# Patient Record
Sex: MALE | Race: WHITE | NOT HISPANIC OR LATINO | Employment: FULL TIME | URBAN - NONMETROPOLITAN AREA
[De-identification: names, ages, dates, MRNs, and addresses within clinical notes are randomized per-mention and may not be internally consistent; named-entity substitution may affect disease eponyms.]

---

## 2017-04-22 ENCOUNTER — APPOINTMENT (OUTPATIENT)
Dept: GENERAL RADIOLOGY | Facility: HOSPITAL | Age: 37
End: 2017-04-22

## 2017-04-22 ENCOUNTER — HOSPITAL ENCOUNTER (INPATIENT)
Facility: HOSPITAL | Age: 37
LOS: 4 days | Discharge: HOME | End: 2017-04-26
Attending: EMERGENCY MEDICINE | Admitting: FAMILY MEDICINE

## 2017-04-22 DIAGNOSIS — J18.9 PNEUMONIA OF RIGHT LUNG DUE TO INFECTIOUS ORGANISM, UNSPECIFIED PART OF LUNG: Primary | ICD-10-CM

## 2017-04-22 LAB
ALBUMIN SERPL-MCNC: 4.2 G/DL (ref 3.5–5)
ALBUMIN/GLOB SERPL: 1.3 G/DL (ref 1.1–2.5)
ALP SERPL-CCNC: 73 U/L (ref 24–120)
ALT SERPL W P-5'-P-CCNC: 42 U/L (ref 0–54)
ANION GAP SERPL CALCULATED.3IONS-SCNC: 16 MMOL/L (ref 4–13)
AST SERPL-CCNC: 36 U/L (ref 7–45)
BASOPHILS # BLD AUTO: 0.03 10*3/MM3 (ref 0–0.2)
BASOPHILS NFR BLD AUTO: 0.1 % (ref 0–2)
BILIRUB SERPL-MCNC: 0.5 MG/DL (ref 0.1–1)
BUN BLD-MCNC: 14 MG/DL (ref 5–21)
BUN/CREAT SERPL: 18.7 (ref 7–25)
CALCIUM SPEC-SCNC: 9.1 MG/DL (ref 8.4–10.4)
CHLORIDE SERPL-SCNC: 101 MMOL/L (ref 98–110)
CO2 SERPL-SCNC: 25 MMOL/L (ref 24–31)
CREAT BLD-MCNC: 0.75 MG/DL (ref 0.5–1.4)
CRP SERPL-MCNC: 0.81 MG/DL (ref 0–0.99)
D-LACTATE SERPL-SCNC: 1.1 MMOL/L (ref 0.5–2)
D-LACTATE SERPL-SCNC: 2.1 MMOL/L (ref 0.5–2)
DEPRECATED RDW RBC AUTO: 45.4 FL (ref 40–54)
EOSINOPHIL # BLD AUTO: 0.08 10*3/MM3 (ref 0–0.7)
EOSINOPHIL NFR BLD AUTO: 0.4 % (ref 0–4)
ERYTHROCYTE [DISTWIDTH] IN BLOOD BY AUTOMATED COUNT: 14.8 % (ref 12–15)
ETHANOL UR QL: 0.08 %
GFR SERPL CREATININE-BSD FRML MDRD: 117 ML/MIN/1.73
GLOBULIN UR ELPH-MCNC: 3.2 GM/DL
GLUCOSE BLD-MCNC: 118 MG/DL (ref 70–100)
HCT VFR BLD AUTO: 47.7 % (ref 40–52)
HGB BLD-MCNC: 16 G/DL (ref 14–18)
IMM GRANULOCYTES # BLD: 0.11 10*3/MM3 (ref 0–0.03)
IMM GRANULOCYTES NFR BLD: 0.5 % (ref 0–5)
LYMPHOCYTES # BLD AUTO: 1.26 10*3/MM3 (ref 0.72–4.86)
LYMPHOCYTES NFR BLD AUTO: 6.1 % (ref 15–45)
MCH RBC QN AUTO: 28.4 PG (ref 28–32)
MCHC RBC AUTO-ENTMCNC: 33.5 G/DL (ref 33–36)
MCV RBC AUTO: 84.6 FL (ref 82–95)
MONOCYTES # BLD AUTO: 0.97 10*3/MM3 (ref 0.19–1.3)
MONOCYTES NFR BLD AUTO: 4.7 % (ref 4–12)
NEUTROPHILS # BLD AUTO: 18.21 10*3/MM3 (ref 1.87–8.4)
NEUTROPHILS NFR BLD AUTO: 88.2 % (ref 39–78)
PLATELET # BLD AUTO: 315 10*3/MM3 (ref 130–400)
PMV BLD AUTO: 9.4 FL (ref 6–12)
POTASSIUM BLD-SCNC: 4.4 MMOL/L (ref 3.5–5.3)
PROT SERPL-MCNC: 7.4 G/DL (ref 6.3–8.7)
RBC # BLD AUTO: 5.64 10*6/MM3 (ref 4.8–5.9)
SODIUM BLD-SCNC: 142 MMOL/L (ref 135–145)
WBC NRBC COR # BLD: 20.66 10*3/MM3 (ref 4.8–10.8)

## 2017-04-22 PROCEDURE — 36415 COLL VENOUS BLD VENIPUNCTURE: CPT | Performed by: EMERGENCY MEDICINE

## 2017-04-22 PROCEDURE — 94640 AIRWAY INHALATION TREATMENT: CPT

## 2017-04-22 PROCEDURE — 86140 C-REACTIVE PROTEIN: CPT | Performed by: EMERGENCY MEDICINE

## 2017-04-22 PROCEDURE — 94760 N-INVAS EAR/PLS OXIMETRY 1: CPT

## 2017-04-22 PROCEDURE — 80053 COMPREHEN METABOLIC PANEL: CPT | Performed by: EMERGENCY MEDICINE

## 2017-04-22 PROCEDURE — 94799 UNLISTED PULMONARY SVC/PX: CPT

## 2017-04-22 PROCEDURE — 83605 ASSAY OF LACTIC ACID: CPT | Performed by: EMERGENCY MEDICINE

## 2017-04-22 PROCEDURE — 99284 EMERGENCY DEPT VISIT MOD MDM: CPT

## 2017-04-22 PROCEDURE — 85025 COMPLETE CBC W/AUTO DIFF WBC: CPT | Performed by: EMERGENCY MEDICINE

## 2017-04-22 PROCEDURE — 25010000002 PIPERACILLIN SOD-TAZOBACTAM PER 1 G: Performed by: EMERGENCY MEDICINE

## 2017-04-22 PROCEDURE — 25010000002 ENOXAPARIN PER 10 MG: Performed by: FAMILY MEDICINE

## 2017-04-22 PROCEDURE — 87040 BLOOD CULTURE FOR BACTERIA: CPT | Performed by: EMERGENCY MEDICINE

## 2017-04-22 PROCEDURE — 80307 DRUG TEST PRSMV CHEM ANLYZR: CPT | Performed by: EMERGENCY MEDICINE

## 2017-04-22 PROCEDURE — 71020 HC CHEST PA AND LATERAL: CPT

## 2017-04-22 RX ORDER — BENAZEPRIL HYDROCHLORIDE 20 MG/1
40 TABLET ORAL
Status: DISCONTINUED | OUTPATIENT
Start: 2017-04-22 | End: 2017-04-26 | Stop reason: HOSPADM

## 2017-04-22 RX ORDER — PRAVASTATIN SODIUM 40 MG
40 TABLET ORAL DAILY
COMMUNITY

## 2017-04-22 RX ORDER — IPRATROPIUM BROMIDE AND ALBUTEROL SULFATE 2.5; .5 MG/3ML; MG/3ML
3 SOLUTION RESPIRATORY (INHALATION)
Status: DISCONTINUED | OUTPATIENT
Start: 2017-04-22 | End: 2017-04-24

## 2017-04-22 RX ORDER — ACETAMINOPHEN 325 MG/1
650 TABLET ORAL EVERY 6 HOURS PRN
Status: DISCONTINUED | OUTPATIENT
Start: 2017-04-22 | End: 2017-04-26 | Stop reason: HOSPADM

## 2017-04-22 RX ORDER — AMLODIPINE BESYLATE AND BENAZEPRIL HYDROCHLORIDE 5; 40 MG/1; MG/1
1 CAPSULE ORAL DAILY
Status: ON HOLD | COMMUNITY
End: 2022-12-27

## 2017-04-22 RX ORDER — AMLODIPINE BESYLATE 5 MG/1
5 TABLET ORAL
Status: DISCONTINUED | OUTPATIENT
Start: 2017-04-22 | End: 2017-04-26 | Stop reason: HOSPADM

## 2017-04-22 RX ORDER — PANTOPRAZOLE SODIUM 40 MG/1
40 TABLET, DELAYED RELEASE ORAL
Status: DISCONTINUED | OUTPATIENT
Start: 2017-04-23 | End: 2017-04-26 | Stop reason: HOSPADM

## 2017-04-22 RX ORDER — SODIUM CHLORIDE 0.9 % (FLUSH) 0.9 %
10 SYRINGE (ML) INJECTION AS NEEDED
Status: DISCONTINUED | OUTPATIENT
Start: 2017-04-22 | End: 2017-04-26 | Stop reason: HOSPADM

## 2017-04-22 RX ORDER — AMLODIPINE BESYLATE AND BENAZEPRIL HYDROCHLORIDE 5; 40 MG/1; MG/1
1 CAPSULE ORAL DAILY
Status: DISCONTINUED | OUTPATIENT
Start: 2017-04-22 | End: 2017-04-22 | Stop reason: ALTCHOICE

## 2017-04-22 RX ORDER — ONDANSETRON 2 MG/ML
4 INJECTION INTRAMUSCULAR; INTRAVENOUS EVERY 6 HOURS PRN
Status: DISCONTINUED | OUTPATIENT
Start: 2017-04-22 | End: 2017-04-26 | Stop reason: HOSPADM

## 2017-04-22 RX ORDER — OMEPRAZOLE 20 MG/1
20 CAPSULE, DELAYED RELEASE ORAL 2 TIMES DAILY
COMMUNITY

## 2017-04-22 RX ADMIN — AMLODIPINE BESYLATE 5 MG: 5 TABLET ORAL at 14:54

## 2017-04-22 RX ADMIN — VANCOMYCIN HYDROCHLORIDE 1000 MG: 1 INJECTION, POWDER, LYOPHILIZED, FOR SOLUTION INTRAVENOUS at 09:00

## 2017-04-22 RX ADMIN — BENAZEPRIL HYDROCHLORIDE 40 MG: 20 TABLET, COATED ORAL at 14:54

## 2017-04-22 RX ADMIN — ENOXAPARIN SODIUM 40 MG: 40 INJECTION SUBCUTANEOUS at 14:55

## 2017-04-22 RX ADMIN — IPRATROPIUM BROMIDE AND ALBUTEROL SULFATE 3 ML: .5; 3 SOLUTION RESPIRATORY (INHALATION) at 15:07

## 2017-04-22 RX ADMIN — IPRATROPIUM BROMIDE AND ALBUTEROL SULFATE 3 ML: .5; 3 SOLUTION RESPIRATORY (INHALATION) at 19:45

## 2017-04-22 RX ADMIN — ACETAMINOPHEN 650 MG: 325 TABLET ORAL at 14:55

## 2017-04-22 RX ADMIN — TAZOBACTAM SODIUM AND PIPERACILLIN SODIUM 4.5 G: 500; 4 INJECTION, SOLUTION INTRAVENOUS at 10:21

## 2017-04-22 NOTE — H&P
History and Physical      CHIEF COMPLAINT:  SOA, cough    Reason for Admission:  SOA, cough    History Obtained From:  Patient/wife/chart    HISTORY OF PRESENT ILLNESS:      The patient is a 36 y.o. male who came to the ER with SOA, cough. He was intoxicated, and had an episode early this morning of vomiting. He then began coughing and has been SOA. He has an infiltrate on CXR. He was well, until this episode this am. He denies recent fever. He has GERD, but it is controlled with medicine. He denies CP. He is c/o HA.     Past Medical History:    Past Medical History:   Diagnosis Date   • Hyperlipidemia    • Hypertension      Past Surgical History:    Past Surgical History:   Procedure Laterality Date   • ADENOIDECTOMY     • CHOLECYSTECTOMY     • TONSILLECTOMY     • TYMPANOSTOMY TUBE PLACEMENT           Medications Prior to Admission:    Prescriptions Prior to Admission   Medication Sig Dispense Refill Last Dose   • amLODIPine-benazepril (LOTREL) 5-40 MG per capsule Take 1 capsule by mouth Daily.   4/20/2017 at 2100   • omeprazole (priLOSEC) 20 MG capsule Take 20 mg by mouth Daily.   4/20/2017 at 0900   • pravastatin (PRAVACHOL) 40 MG tablet Take 40 mg by mouth Daily.   4/20/2017 at 2100       Allergies:  Review of patient's allergies indicates no known allergies.    Social History:   TOBACCO:   reports that he has quit smoking. His smoking use included Cigarettes. He does not have any smokeless tobacco history on file.  ETOH:   reports that he drinks alcohol.  DRUGS:   reports that he does not use illicit drugs.  MARITAL STATUS:    Patient currently lives with Family      Family History:   History reviewed. No pertinent family history.  REVIEW OF SYSTEMS:  Constitutional: neg  CV: neg  Pulmonary: cough, SOA  GI: neg  : neg  Psych: neg  Neuro: headache  Skin: neg  MusculoSkeletal: neg  HEENT: neg  Joints: neg  Vitals:  /83 (BP Location: Right arm, Patient Position: Sitting)  Pulse 102  Temp 99.2 °F  "(37.3 °C) (Temporal Artery )   Resp 22  Ht 72\" (182.9 cm)  Wt (!) 330 lb (150 kg)  SpO2 95%  BMI 44.76 kg/m2    PHYSICAL EXAM:  Gen: NAD, alert, pleasant, on O2  HEENT: WNL  Lymph: no LAD  Neck: no JVD or masses  Chest: coarse on right   CV: RRR  Abdomen: NT/ND  Extrem: no C/C/E  Neuro: non focal  Skin: no rashes  Joints: no redness  DATA:  I have reviewed the admission labs and imaging tests.    ASSESSMENT AND PLAN:      Aspiration Pneumonia---continue current antibiotic treatment, consult Pulmonary, continue O2 and wean as able, add                                                 nebulized treatments  Alcohol Abuse----ask Social Service to give patient resources for cessation, monitor for any withdrawal  HTN---monitor blood pressure    Gómez Franklin MD  1:58 PM 4/22/2017  "

## 2017-04-22 NOTE — PLAN OF CARE
Problem: Patient Care Overview (Adult)  Goal: Plan of Care Review  Outcome: Ongoing (interventions implemented as appropriate)    04/22/17 5406   Coping/Psychosocial Response Interventions   Plan Of Care Reviewed With patient   Patient Care Overview   Progress progress toward functional goals as expected         Problem: Infection, Risk/Actual (Adult)  Goal: Infection Prevention/Resolution  Outcome: Ongoing (interventions implemented as appropriate)

## 2017-04-22 NOTE — ED NOTES
Pt states that he got drunk last night.  Pt's wife states that pt vomited around 0030 - 0100 this morning.  Wife states that pt woke her up coughing around 0300 - 0400 this morning.     Mirela Payne RN  04/22/17 0703

## 2017-04-22 NOTE — ED NOTES
PT IS SLEEPING AND HAS NOTED APNEIC EPISODE WITH INCREASED RESPIRATION. PT PLACED ON O2 MONITOR AND SATS 84% RA & RR 40. PT IS MOUTH BREATHING. NOTIFIED DR. SAUCEDA. PT PLACED ON NR MASK AT 12LPM  PER DR. SAUCEDA. PT'S SATS INCREASED TO 95%.      Jacqueline Nicholson RN  04/22/17 0963

## 2017-04-22 NOTE — ED PROVIDER NOTES
Subjective   HPI Comments: Had lot to drink last night but none since midnight.  Did have some vomiting after that.  Woke up with severe cough couple of hours after that.  Has been uncontrolled cough since that time.  Little better now but starts again any time he breathes deep.    Patient is a 36 y.o. male presenting with cough.   History provided by:  Patient and spouse   used: No    Cough   Cough characteristics:  Non-productive and harsh  Severity:  Severe  Onset quality:  Sudden  Duration:  3 hours  Timing:  Intermittent  Progression:  Unchanged  Chronicity:  New  Smoker: no    Context: not animal exposure, not exposure to allergens, not occupational exposure, not sick contacts, not smoke exposure, not upper respiratory infection, not weather changes and not with activity    Relieved by:  Nothing  Worsened by:  Nothing  Ineffective treatments:  None tried  Associated symptoms: no chest pain, no chills, no diaphoresis, no ear fullness, no ear pain, no eye discharge, no fever, no headaches, no rhinorrhea, no sinus congestion and no weight loss    Risk factors: no chemical exposure, no recent infection and no recent travel        Review of Systems   Constitutional: Negative.  Negative for chills, diaphoresis, fever and weight loss.   HENT: Negative.  Negative for ear pain and rhinorrhea.    Eyes: Negative for discharge.   Respiratory: Positive for cough.    Cardiovascular: Negative.  Negative for chest pain.   Gastrointestinal: Positive for vomiting.   Genitourinary: Negative.    Musculoskeletal: Negative.    Skin: Negative.    Neurological: Negative.  Negative for headaches.   Hematological: Negative.    Psychiatric/Behavioral: Negative.    All other systems reviewed and are negative.      Past Medical History:   Diagnosis Date   • Hyperlipidemia    • Hypertension        No Known Allergies    Past Surgical History:   Procedure Laterality Date   • ADENOIDECTOMY     • CHOLECYSTECTOMY     •  TONSILLECTOMY     • TYMPANOSTOMY TUBE PLACEMENT         History reviewed. No pertinent family history.    Social History     Social History   • Marital status:      Spouse name: N/A   • Number of children: N/A   • Years of education: N/A     Social History Main Topics   • Smoking status: Former Smoker   • Smokeless tobacco: None   • Alcohol use Yes      Comment: beer every weekend   • Drug use: No   • Sexual activity: Not Asked     Other Topics Concern   • None     Social History Narrative   • None       Prior to Admission medications    Medication Sig Start Date End Date Taking? Authorizing Provider   AMLODIPINE BESYLATE PO Take 1 tablet by mouth Daily.   Yes Historical Provider, MD   LISINOPRIL PO Take 1 tablet by mouth Daily.   Yes Historical Provider, MD   OMEPRAZOLE PO Take 1 tablet by mouth Daily.   Yes Historical Provider, MD       Medications   sodium chloride 0.9 % flush 10 mL (not administered)   piperacillin-tazobactam (ZOSYN) 4.5 g in iso-osmotic dextrose 100 mL IVPB (premix) (4.5 g Intravenous New Bag 4/22/17 1021)   vancomycin 1 g/250 mL 0.9% NS (vial-mate) (0 mg Intravenous Stopped 4/22/17 1018)       Vitals:    04/22/17 1044   BP: 151/68   Pulse: 115   Resp: 22   Temp: 98.9 °F (37.2 °C)   SpO2: 98%         Objective   Physical Exam   Constitutional: He is oriented to person, place, and time. He appears well-developed and well-nourished.   HENT:   Head: Normocephalic and atraumatic.   Mouth/Throat: Oropharynx is clear and moist.   Eyes: EOM are normal. Pupils are equal, round, and reactive to light.   Neck: Normal range of motion. Neck supple.   Cardiovascular: Normal rate and regular rhythm.    Pulmonary/Chest: Effort normal and breath sounds normal.   Abdominal: Soft. Bowel sounds are normal. There is no tenderness.   obese   Musculoskeletal: Normal range of motion.   Neurological: He is alert and oriented to person, place, and time.   Skin: Skin is warm and dry.   Psychiatric: He has a  normal mood and affect. His behavior is normal.   Nursing note and vitals reviewed.      Procedures         Lab Results (last 24 hours)     Procedure Component Value Units Date/Time    CBC & Differential [[id number]] Collected:  04/22/17 0904    Specimen:  Blood Updated:  04/22/17 0938    Narrative:       The following orders were created for panel order CBC & Differential.  Procedure                               Abnormality         Status                     ---------                               -----------         ------                     CBC Auto Differential[[id number]]         Abnormal            Final result                 Please view results for these tests on the individual orders.    Comprehensive Metabolic Panel [[id number]]  (Abnormal) Collected:  04/22/17 0904    Specimen:  Blood Updated:  04/22/17 0952     Glucose 118 (H) mg/dL      BUN 14 mg/dL      Creatinine 0.75 mg/dL      Sodium 142 mmol/L      Potassium 4.4 mmol/L      Chloride 101 mmol/L      CO2 25.0 mmol/L      Calcium 9.1 mg/dL      Total Protein 7.4 g/dL      Albumin 4.20 g/dL      ALT (SGPT) 42 U/L      AST (SGOT) 36 U/L      Alkaline Phosphatase 73 U/L      Total Bilirubin 0.5 mg/dL      eGFR Non African Amer 117 mL/min/1.73      Globulin 3.2 gm/dL      A/G Ratio 1.3 g/dL      BUN/Creatinine Ratio 18.7     Anion Gap 16.0 (H) mmol/L     Blood Culture [[id number]] Collected:  04/22/17 0904    Specimen:  Blood from Hand, Right Updated:  04/22/17 0933    Blood Culture [[id number]] Collected:  04/22/17 0904    Specimen:  Blood from Arm, Right Updated:  04/22/17 0933    Lactic Acid, Plasma [[id number]]  (Abnormal) Collected:  04/22/17 0904    Specimen:  Blood Updated:  04/22/17 0958     Lactate 2.1 (C) mmol/L     C-reactive Protein [[id number]]  (Normal) Collected:  04/22/17 0904    Specimen:  Blood Updated:  04/22/17 0952     C-Reactive Protein 0.81 mg/dL     CBC Auto Differential [[id number]]  (Abnormal) Collected:  04/22/17 0904    Specimen:  Blood  Updated:  04/22/17 0938     WBC 20.66 (H) 10*3/mm3      RBC 5.64 10*6/mm3      Hemoglobin 16.0 g/dL      Hematocrit 47.7 %      MCV 84.6 fL      MCH 28.4 pg      MCHC 33.5 g/dL      RDW 14.8 %      RDW-SD 45.4 fl      MPV 9.4 fL      Platelets 315 10*3/mm3      Neutrophil % 88.2 (H) %      Lymphocyte % 6.1 (L) %      Monocyte % 4.7 %      Eosinophil % 0.4 %      Basophil % 0.1 %      Immature Grans % 0.5 %      Neutrophils, Absolute 18.21 (H) 10*3/mm3      Lymphocytes, Absolute 1.26 10*3/mm3      Monocytes, Absolute 0.97 10*3/mm3      Eosinophils, Absolute 0.08 10*3/mm3      Basophils, Absolute 0.03 10*3/mm3      Immature Grans, Absolute 0.11 (H) 10*3/mm3     Ethanol [[id number]]  (Abnormal) Collected:  04/22/17 0904    Specimen:  Blood Updated:  04/22/17 0952     Ethanol % 0.084 (H) %     Narrative:       Not for legal purposes. Chain of Custody not followed.           XR Chest 2 View   Preliminary Result   Impression:       Ill-defined opacities in the right lung, nonspecific as they may reflect   reactive changes, aspiration or infectious etiology. Clinical   correlation recommended. There is no focal consolidation.       This report was finalized on  by Dr. Jeannie Harmon MD.          ED Course  ED Course   Comment By Time   Spoke with Dr. Franklin on call for Dr. Jean Baptiste and will admit. Rachid Armendariz Jr., MD 04/22 1111          MDM  Number of Diagnoses or Management Options  Pneumonia of right lung due to infectious organism, unspecified part of lung: new and requires workup     Amount and/or Complexity of Data Reviewed  Clinical lab tests: ordered and reviewed  Tests in the radiology section of CPT®: ordered and reviewed    Risk of Complications, Morbidity, and/or Mortality  Presenting problems: moderate  Diagnostic procedures: moderate  Management options: moderate    Patient Progress  Patient progress: stable      Final diagnoses:   Pneumonia of right lung due to infectious organism, unspecified part of lung         Rachid Armendariz Jr., MD  04/22/17 1111

## 2017-04-23 LAB
ANION GAP SERPL CALCULATED.3IONS-SCNC: 9 MMOL/L (ref 4–13)
BASOPHILS # BLD AUTO: 0.02 10*3/MM3 (ref 0–0.2)
BASOPHILS NFR BLD AUTO: 0.2 % (ref 0–2)
BUN BLD-MCNC: 15 MG/DL (ref 5–21)
BUN/CREAT SERPL: 18.5 (ref 7–25)
CALCIUM SPEC-SCNC: 8.8 MG/DL (ref 8.4–10.4)
CHLORIDE SERPL-SCNC: 100 MMOL/L (ref 98–110)
CO2 SERPL-SCNC: 30 MMOL/L (ref 24–31)
CREAT BLD-MCNC: 0.81 MG/DL (ref 0.5–1.4)
DEPRECATED RDW RBC AUTO: 48.1 FL (ref 40–54)
EOSINOPHIL # BLD AUTO: 0.33 10*3/MM3 (ref 0–0.7)
EOSINOPHIL NFR BLD AUTO: 2.8 % (ref 0–4)
ERYTHROCYTE [DISTWIDTH] IN BLOOD BY AUTOMATED COUNT: 15.4 % (ref 12–15)
GFR SERPL CREATININE-BSD FRML MDRD: 107 ML/MIN/1.73
GLUCOSE BLD-MCNC: 161 MG/DL (ref 70–100)
HCT VFR BLD AUTO: 42.9 % (ref 40–52)
HGB BLD-MCNC: 13.8 G/DL (ref 14–18)
IMM GRANULOCYTES # BLD: 0.07 10*3/MM3 (ref 0–0.03)
IMM GRANULOCYTES NFR BLD: 0.6 % (ref 0–5)
LYMPHOCYTES # BLD AUTO: 1.01 10*3/MM3 (ref 0.72–4.86)
LYMPHOCYTES NFR BLD AUTO: 8.6 % (ref 15–45)
MCH RBC QN AUTO: 27.7 PG (ref 28–32)
MCHC RBC AUTO-ENTMCNC: 32.2 G/DL (ref 33–36)
MCV RBC AUTO: 86.1 FL (ref 82–95)
MONOCYTES # BLD AUTO: 1.02 10*3/MM3 (ref 0.19–1.3)
MONOCYTES NFR BLD AUTO: 8.7 % (ref 4–12)
NEUTROPHILS # BLD AUTO: 9.29 10*3/MM3 (ref 1.87–8.4)
NEUTROPHILS NFR BLD AUTO: 79.1 % (ref 39–78)
PLATELET # BLD AUTO: 261 10*3/MM3 (ref 130–400)
PMV BLD AUTO: 9.1 FL (ref 6–12)
POTASSIUM BLD-SCNC: 4.5 MMOL/L (ref 3.5–5.3)
RBC # BLD AUTO: 4.98 10*6/MM3 (ref 4.8–5.9)
SODIUM BLD-SCNC: 139 MMOL/L (ref 135–145)
WBC NRBC COR # BLD: 11.74 10*3/MM3 (ref 4.8–10.8)

## 2017-04-23 PROCEDURE — 85025 COMPLETE CBC W/AUTO DIFF WBC: CPT | Performed by: FAMILY MEDICINE

## 2017-04-23 PROCEDURE — 94799 UNLISTED PULMONARY SVC/PX: CPT

## 2017-04-23 PROCEDURE — 25010000002 ENOXAPARIN PER 10 MG: Performed by: FAMILY MEDICINE

## 2017-04-23 PROCEDURE — 94760 N-INVAS EAR/PLS OXIMETRY 1: CPT

## 2017-04-23 PROCEDURE — 80048 BASIC METABOLIC PNL TOTAL CA: CPT | Performed by: FAMILY MEDICINE

## 2017-04-23 RX ADMIN — IPRATROPIUM BROMIDE AND ALBUTEROL SULFATE 3 ML: .5; 3 SOLUTION RESPIRATORY (INHALATION) at 07:22

## 2017-04-23 RX ADMIN — IPRATROPIUM BROMIDE AND ALBUTEROL SULFATE 3 ML: .5; 3 SOLUTION RESPIRATORY (INHALATION) at 15:36

## 2017-04-23 RX ADMIN — IPRATROPIUM BROMIDE AND ALBUTEROL SULFATE 3 ML: .5; 3 SOLUTION RESPIRATORY (INHALATION) at 11:08

## 2017-04-23 RX ADMIN — PANTOPRAZOLE SODIUM 40 MG: 40 TABLET, DELAYED RELEASE ORAL at 05:27

## 2017-04-23 RX ADMIN — BENAZEPRIL HYDROCHLORIDE 40 MG: 20 TABLET, COATED ORAL at 08:18

## 2017-04-23 RX ADMIN — ENOXAPARIN SODIUM 40 MG: 40 INJECTION SUBCUTANEOUS at 14:28

## 2017-04-23 RX ADMIN — ACETAMINOPHEN 650 MG: 325 TABLET ORAL at 05:27

## 2017-04-23 RX ADMIN — IPRATROPIUM BROMIDE AND ALBUTEROL SULFATE 3 ML: .5; 3 SOLUTION RESPIRATORY (INHALATION) at 19:56

## 2017-04-23 RX ADMIN — AMLODIPINE BESYLATE 5 MG: 5 TABLET ORAL at 08:18

## 2017-04-23 RX ADMIN — ACETAMINOPHEN 650 MG: 325 TABLET ORAL at 15:29

## 2017-04-23 NOTE — PLAN OF CARE
Problem: Patient Care Overview (Adult)  Goal: Plan of Care Review  Outcome: Ongoing (interventions implemented as appropriate)    04/23/17 1539   Coping/Psychosocial Response Interventions   Plan Of Care Reviewed With patient   Patient Care Overview   Progress improving   Outcome Evaluation   Outcome Summary/Follow up Plan patient O2 wheened down to 6L this shift.        Goal: Adult Individualization and Mutuality  Outcome: Ongoing (interventions implemented as appropriate)  Goal: Discharge Needs Assessment  Outcome: Ongoing (interventions implemented as appropriate)    Problem: Infection, Risk/Actual (Adult)  Goal: Identify Related Risk Factors and Signs and Symptoms  Outcome: Ongoing (interventions implemented as appropriate)  Goal: Infection Prevention/Resolution  Outcome: Ongoing (interventions implemented as appropriate)

## 2017-04-23 NOTE — PROGRESS NOTES
Discharge Planning Assessment   [facility]     Patient Name: Conner Almonte  MRN: [id number]  Today's Date: 4/23/2017    Admit Date: 4/22/2017          Discharge Needs Assessment       04/23/17 1045    Living Environment    Lives With spouse    Quality Of Family Relationships involved    Able to Return to Prior Living Arrangements yes    Discharge Needs Assessment    Concerns To Be Addressed denies needs/concerns at this time    Anticipated Changes Related to Illness none    Equipment Currently Used at Home none    Equipment Needed After Discharge none    Transportation Available car;family or friend will provide    Current Discharge Risk substance abuse    Discharge Disposition home or self-care    Discharge Planning Comments Pt was sleeping soundly after attempting to awake him several times. Spoke with his wife, who was in the room. She said that pt is independent and the plan is to go home at d/c. Left a chemical dependency packet for the pt to review. Spouse denies needs for pt.             Discharge Plan     None        Discharge Placement     No information found                Demographic Summary     None            Functional Status     None            Psychosocial     None            Abuse/Neglect     None            Legal     None            Substance Abuse     None            Patient Forms     None          ARIEL Roque

## 2017-04-23 NOTE — CONSULTS
PULMONARY & CRITICAL CARE CONSULT - [facility]    17, 8:46 AM  Patient Care Team:  Donovan Jean Baptiste MD as PCP - General (Family Medicine)  Name: Conner Almonte  : 1980  MRN: [id number]  Contact Serial Number [id number]    Chief complaint: Pneumonia    HPI:  We have been consulted by Donovan Jean Baptiste MD to see this 36 y.o. year old male born on 1980.  Patient complains of cough in the chest for 2 days. Severity: moderate.  Aggravating factors: deep inspiration.   Alleviating factors: medication(s) (albuterol and atrovent) Associated symptoms: chest pain, poor exercise tolerance and vomiting. Sputum is scant.  Patient currently is not on home oxygen therapy.. Respiratory history: no history of pneumonia or bronchitis  Patient history include chronic alcohol abuse. Apparently drank some amount of alcohol on Friday and came in to the emergency room intoxicated and complaining of a persistent cough. There was some question as to whether or not he aspirated while vomiting the night before. He did not confirm this and was unsure. He does report a history of reflux issues that is being treated. He reports that in some ways he feels worse today than he did when he came in. He denies feeling overly short of breath although he is requiring 11L high flow oxygen. He just reports feeling fatigue and soreness in his chest from coughing so much. He denies any lung history but his wife reports that he snores a lot, has fatigue at times and they were going to set him up for a sleep study but that never happened. No other aggravating, alleviating factors or associated symptoms.    Past Medical History:  Past Medical History:   Diagnosis Date   • Hyperlipidemia    • Hypertension      Past Surgical History:   Procedure Laterality Date   • ADENOIDECTOMY     • CHOLECYSTECTOMY     • TONSILLECTOMY     • TYMPANOSTOMY TUBE PLACEMENT       No Known Allergies  Medications:    amLODIPine 5 mg Oral Q24H    benazepril 40 mg Oral Q24H   enoxaparin 40 mg Subcutaneous Q24H   ipratropium-albuterol 3 mL Nebulization 4x Daily - RT   pantoprazole 40 mg Oral Q AM        Family History:  History reviewed. No pertinent family history.  Social History:   reports that he has quit smoking. His smoking use included Cigarettes. He does not have any smokeless tobacco history on file. He reports that he drinks alcohol. He reports that he does not use illicit drugs.  Review of Systems:  Review of Systems   Constitutional: Positive for activity change, appetite change and fatigue. Negative for chills, diaphoresis and unexpected weight change.   HENT: Positive for congestion. Negative for ear pain, nosebleeds, sneezing and trouble swallowing.    Eyes: Negative for pain, discharge and itching.   Respiratory: Positive for cough, choking and chest tightness. Negative for apnea, shortness of breath and wheezing.    Cardiovascular: Positive for leg swelling. Negative for chest pain and palpitations.   Gastrointestinal: Negative for abdominal distention, constipation, nausea and vomiting.   Endocrine: Negative for cold intolerance, polydipsia, polyphagia and polyuria.   Genitourinary: Negative for difficulty urinating, hematuria, penile swelling and testicular pain.   Musculoskeletal: Negative for arthralgias, joint swelling and myalgias.   Skin: Negative for color change, pallor and rash.   Allergic/Immunologic: Negative for environmental allergies, food allergies and immunocompromised state.   Neurological: Positive for weakness. Negative for dizziness and light-headedness.   Hematological: Negative for adenopathy. Does not bruise/bleed easily.   Psychiatric/Behavioral: Negative for confusion and hallucinations. The patient is not nervous/anxious.    All other systems reviewed and are negative.     Physical Exam:  Temp:  [98.2 °F (36.8 °C)-100.4 °F (38 °C)] 99.5 °F (37.5 °C)  Heart Rate:  [] 77  Resp:  [16-39] 20  BP:  (103-152)/(60-95) 124/65  Intake/Output Summary (Last 24 hours) at 04/23/17 0846  Last data filed at 04/23/17 0419   Gross per 24 hour   Intake              840 ml   Output             1300 ml   Net             -460 ml     Last 3 weights    04/22/17  0642 04/22/17  1929   Weight: (!) 330 lb (150 kg) (!) 337 lb (153 kg)     SpO2 Readings from Last 3 Encounters:   04/23/17 97%     Physical Exam   Constitutional: He is oriented to person, place, and time. He appears well-developed and well-nourished. No distress. Nasal cannula in place.   HENT:   Head: Normocephalic and atraumatic.   Right Ear: External ear normal.   Left Ear: External ear normal.   Nose: Nose normal.   Mouth/Throat: Oropharynx is clear and moist. No oropharyngeal exudate.   Eyes: Conjunctivae and EOM are normal. Pupils are equal, round, and reactive to light. Right eye exhibits no discharge. Left eye exhibits no discharge.   Neck: Normal range of motion. Neck supple. No JVD present.   Cardiovascular: Normal rate and regular rhythm.    No murmur heard.  Pulmonary/Chest: He has decreased breath sounds in the right upper field, the right middle field, the right lower field, the left upper field, the left middle field and the left lower field.   Abdominal: Soft. Bowel sounds are normal.   Musculoskeletal: Normal range of motion. He exhibits edema.   2+ edema   Neurological: He is oriented to person, place, and time. He has normal reflexes. No cranial nerve deficit. Coordination normal.   Skin: Skin is warm and dry. No rash noted.   Psychiatric: He has a normal mood and affect. His behavior is normal. Thought content normal.   Nursing note and vitals reviewed.      Results from last 7 days  Lab Units 04/23/17  0510 04/22/17  0904   WBC 10*3/mm3 11.74* 20.66*   HEMOGLOBIN g/dL 13.8* 16.0   PLATELETS 10*3/mm3 261 315       Results from last 7 days  Lab Units 04/23/17  0510 04/22/17  0904   SODIUM mmol/L 139 142   POTASSIUM mmol/L 4.5 4.4   TOTAL CO2 mmol/L  30.0 25.0   BUN mg/dL 15 14   CREATININE mg/dL 0.81 0.75   GLUCOSE mg/dL 161* 118*         No results found for: PROBNP  Blood Culture   Date Value Ref Range Status   2017 No growth at less than 24 hours  Preliminary   2017 No growth at less than 24 hours  Preliminary     Recent radiology:   Imaging Results (last 72 hours)     Procedure Component Value Units Date/Time    XR Chest 2 View [[id number]] Collected:  17 0842     Updated:  17 1332    Narrative:       Exam:   XR CHEST 2 VW-       Date:  2017      History:  Male, age  36 years;cough post vomiting     COMPARISON:  None.     Findings :     Borderline size of the cardiomediastinal silhouette. Ill-defined  opacities in the right lung, without pleural effusion or pneumothorax.   The bones show no acute pathology.         Impression:       Impression:     Ill-defined opacities in the right lung, nonspecific as they may reflect  reactive changes, aspiration or infectious etiology. Clinical  correlation recommended. There is no focal consolidation.     This report was finalized on 2017 13:29 by Dr. Jeannie Harmon MD.        My radiograph interpretation/independent review of imaging: agree with right sided infiltrate, consistent with an aspiration event    Other test results (not lab or imaging): none    Independent review of ekg: not done    Patient Active Problem List   Diagnosis   • Pneumonia of right lung due to infectious organism     Pulmonary Assessment:    New problem (to me), with additional workup planned: Acute Hypoxemic Respiratory Failure, Aspiration Pneumonia    New problem (to me), no additional workup planned: none    Other problems either stable, failing to improve or worsenin. Chronic Alcohol Abuse  2. Suspected Sleep Apnea  3. Morbid Obesity  4. Leukocytosis-improving  5. Reflux    Recommend/plan:   · Wean down high flow oxygen as tolerated  · Will order sputum culture an gram stain  · Continue intravenous  antibiotic coverage  · Mobilize as tolerated  · Will defer treatment of reflux and alcohol abuse to his attending  · Would recommend a sleep study outpatient once he is stable enough to do so given his leg edema  · Does not need to be treated with intravenous steroids as this will worsen his edema as well as his reflux  · Will order follow-up cxr for in the morning    Electronically signed by RANDY Davison, 04/23/17, 8:46 AM    Physician substantive contribution:  Pertinent symptoms/interval history include: shortness of breath with exertion  Respiratory exam shows pertinent findings of:crackles.  Plan includes: continue oxygen.  Aa gradient is significant currently.  Follow oxygenation, repeat cxr to evaluate for any worsening or complications.  EtoH puts him at higher risk for complications from organisms such as pneumococcus.  I have seen and examined patient personally, performing a face-to-face diagnostic evaluation with plan of care reviewed and developed with APRN and nursing staff. I have addended and/or modified the above history of present illness, physical examination, and assessment and plan to reflect my findings and impressions. Essential elements of the care plan were discussed with APRN above.  Agree with findings and assessment/plan as documented above.    Electronically signed by Reyes Beaver MD, on 4/23/2017, 12:53 PM

## 2017-04-23 NOTE — PROGRESS NOTES
"Progress Note  Conner Almonte  4/23/2017 6:19 PM  Subjective:   Admit Date:   4/22/2017      CC/ADMIT DX:       Interval History:   Reviewed overnight events and nursing notes.  He is improving. His cough is better. He has less SOA.        I have reviewed all labs/diagnostics from the last 24hrs.       ROS:   I have done a 10 point ROS and all are negative, except what is mentioned in the HPI.    Diet Regular    Medications:        amLODIPine 5 mg Oral Q24H   benazepril 40 mg Oral Q24H   enoxaparin 40 mg Subcutaneous Q24H   ipratropium-albuterol 3 mL Nebulization 4x Daily - RT   pantoprazole 40 mg Oral Q AM           Objective:   Vitals: /71 (BP Location: Left arm, Patient Position: Sitting)  Pulse 96  Temp 98 °F (36.7 °C) (Oral)   Resp 20  Ht 72\" (182.9 cm)  Wt (!) 337 lb (153 kg)  SpO2 100%  BMI 45.71 kg/m2   Intake/Output Summary (Last 24 hours) at 04/23/17 1819  Last data filed at 04/23/17 1814   Gross per 24 hour   Intake             1960 ml   Output             1850 ml   Net              110 ml     General appearance: alert and cooperative with exam  Lungs: relatively clear, on O2  Heart: regular rate and rhythm, S1, S2 normal, no murmur, click, rub or gallop  Abdomen: soft, non-tender; bowel sounds normal; no masses,  no organomegaly  Extremities: extremities normal, atraumatic, no cyanosis or edema  Neurologic:  No obvious focal neurologic deficits.    Assessment and Plan:   Active Problems:    Pneumonia of right lung due to infectious organism    Aspiration    GERD    Probable CECE    Hypoxia    Alcohol Abuse      Plan:  1.  Continue present medication(s)   2.  Will need Sleep study as outpatient  3.  Continue care. Wean O2  4.  Follow with Pulmonology      Discharge planning:   home     Reviewed treatment plans with the patient and/or family.             Electronically signed by Gómez Franklin MD on 4/23/2017 at 6:19 PM    "

## 2017-04-23 NOTE — PLAN OF CARE
Problem: Patient Care Overview (Adult)  Goal: Plan of Care Review  Outcome: Ongoing (interventions implemented as appropriate)    04/23/17 050   Coping/Psychosocial Response Interventions   Plan Of Care Reviewed With patient   Patient Care Overview   Progress progress toward functional goals as expected   Outcome Evaluation   Outcome Summary/Follow up Plan pt remains on 12lpm High flow NC, resting well this shift         Problem: Infection, Risk/Actual (Adult)  Goal: Identify Related Risk Factors and Signs and Symptoms  Outcome: Ongoing (interventions implemented as appropriate)  Goal: Infection Prevention/Resolution  Outcome: Ongoing (interventions implemented as appropriate)

## 2017-04-24 ENCOUNTER — APPOINTMENT (OUTPATIENT)
Dept: GENERAL RADIOLOGY | Facility: HOSPITAL | Age: 37
End: 2017-04-24

## 2017-04-24 PROCEDURE — 87205 SMEAR GRAM STAIN: CPT | Performed by: NURSE PRACTITIONER

## 2017-04-24 PROCEDURE — 87070 CULTURE OTHR SPECIMN AEROBIC: CPT | Performed by: NURSE PRACTITIONER

## 2017-04-24 PROCEDURE — 94760 N-INVAS EAR/PLS OXIMETRY 1: CPT

## 2017-04-24 PROCEDURE — 94799 UNLISTED PULMONARY SVC/PX: CPT

## 2017-04-24 PROCEDURE — 71010 HC CHEST PA OR AP: CPT

## 2017-04-24 PROCEDURE — 25010000002 LEVOFLOXACIN PER 250 MG: Performed by: FAMILY MEDICINE

## 2017-04-24 PROCEDURE — 25010000002 PIPERACILLIN SOD-TAZOBACTAM PER 1 G: Performed by: FAMILY MEDICINE

## 2017-04-24 RX ORDER — IPRATROPIUM BROMIDE AND ALBUTEROL SULFATE 2.5; .5 MG/3ML; MG/3ML
3 SOLUTION RESPIRATORY (INHALATION)
Status: DISCONTINUED | OUTPATIENT
Start: 2017-04-24 | End: 2017-04-26 | Stop reason: HOSPADM

## 2017-04-24 RX ORDER — LEVOFLOXACIN 5 MG/ML
500 INJECTION, SOLUTION INTRAVENOUS EVERY 24 HOURS
Status: DISCONTINUED | OUTPATIENT
Start: 2017-04-24 | End: 2017-04-26

## 2017-04-24 RX ORDER — BUMETANIDE 0.25 MG/ML
1 INJECTION INTRAMUSCULAR; INTRAVENOUS ONCE
Status: COMPLETED | OUTPATIENT
Start: 2017-04-24 | End: 2017-04-24

## 2017-04-24 RX ADMIN — AMLODIPINE BESYLATE 5 MG: 5 TABLET ORAL at 08:12

## 2017-04-24 RX ADMIN — IPRATROPIUM BROMIDE AND ALBUTEROL SULFATE 3 ML: .5; 3 SOLUTION RESPIRATORY (INHALATION) at 20:15

## 2017-04-24 RX ADMIN — BUMETANIDE 1 MG: 0.25 INJECTION, SOLUTION INTRAMUSCULAR; INTRAVENOUS at 19:53

## 2017-04-24 RX ADMIN — TAZOBACTAM SODIUM AND PIPERACILLIN SODIUM 3.38 G: 375; 3 INJECTION, SOLUTION INTRAVENOUS at 19:53

## 2017-04-24 RX ADMIN — IPRATROPIUM BROMIDE AND ALBUTEROL SULFATE 3 ML: .5; 3 SOLUTION RESPIRATORY (INHALATION) at 07:34

## 2017-04-24 RX ADMIN — PANTOPRAZOLE SODIUM 40 MG: 40 TABLET, DELAYED RELEASE ORAL at 05:29

## 2017-04-24 RX ADMIN — LEVOFLOXACIN 500 MG: 500 INJECTION, SOLUTION INTRAVENOUS at 21:00

## 2017-04-24 RX ADMIN — IPRATROPIUM BROMIDE AND ALBUTEROL SULFATE 3 ML: .5; 3 SOLUTION RESPIRATORY (INHALATION) at 15:19

## 2017-04-24 RX ADMIN — IPRATROPIUM BROMIDE AND ALBUTEROL SULFATE 3 ML: .5; 3 SOLUTION RESPIRATORY (INHALATION) at 11:26

## 2017-04-24 RX ADMIN — BENAZEPRIL HYDROCHLORIDE 40 MG: 20 TABLET, COATED ORAL at 08:12

## 2017-04-24 NOTE — PLAN OF CARE
Problem: Patient Care Overview (Adult)  Goal: Plan of Care Review  Outcome: Ongoing (interventions implemented as appropriate)    04/24/17 3485   Coping/Psychosocial Response Interventions   Plan Of Care Reviewed With patient   Patient Care Overview   Progress improving   Outcome Evaluation   Outcome Summary/Follow up Plan Patient is up ad uday; decreased o2 to 6 l HF; pt up ad uday; pt in good spirits and looking forward to discharge       Goal: Adult Individualization and Mutuality  Outcome: Ongoing (interventions implemented as appropriate)    Problem: Pneumonia (Adult)  Goal: Signs and Symptoms of Listed Potential Problems Will be Absent or Manageable (Pneumonia)  Outcome: Ongoing (interventions implemented as appropriate)

## 2017-04-24 NOTE — PROGRESS NOTES
PULMONARY AND CRITICAL CARE PROGRESS NOTE - [facility]    Patient: Conner Almonte  1980   MR# [id number]   Acct# [id number]  04/24/17   1:33 PM  Referring Provider: Donovan Jean Baptiste MD    Patient Active Problem List   Diagnosis   • Pneumonia of right lung due to infectious organism      Chief Complaint: Pneumonia    Interval history: Patient reports that his breathing seems to be improving and his cough is some better today. He does report that he did not sleep hardly any last night because they were stripping the floors all night and it was too loud and he also reports that he has had a headache. No other aggravating, alleviating factors or associated symptoms.    HPI  Meds:    amLODIPine 5 mg Oral Q24H   benazepril 40 mg Oral Q24H   enoxaparin 40 mg Subcutaneous Q12H   ipratropium-albuterol 3 mL Nebulization TID   pantoprazole 40 mg Oral Q AM        Review of Systems:   Review of Systems:    Constitutional: Positive for activity change, appetite change and fatigue. Negative for chills, diaphoresis and unexpected weight change.   HENT: Positive for congestion. Negative for ear pain, nosebleeds, sneezing and trouble swallowing.   Eyes: Negative for pain, discharge and itching.   Respiratory: Positive for cough, choking and chest tightness. Negative for apnea, shortness of breath and wheezing.   Cardiovascular: Positive for leg swelling. Negative for chest pain and palpitations.   Gastrointestinal: Negative for abdominal distention, constipation, nausea and vomiting.   Endocrine: Negative for cold intolerance, polydipsia, polyphagia and polyuria.   Genitourinary: Negative for difficulty urinating, hematuria, penile swelling and testicular pain.   Musculoskeletal: Negative for arthralgias, joint swelling and myalgias.   Skin: Negative for color change, pallor and rash.   Allergic/Immunologic: Negative for environmental allergies, food allergies and immunocompromised state.   Neurological:  Positive for weakness. Negative for dizziness and light-headedness.   Hematological: Negative for adenopathy. Does not bruise/bleed easily.   Psychiatric/Behavioral: Negative for confusion and hallucinations. The patient is not nervous/anxious.   All other systems reviewed and are negative.    Physical Exam:  SpO2 Readings from Last 3 Encounters:   04/24/17 95%     Temp:  [98.2 °F (36.8 °C)-98.5 °F (36.9 °C)] 98.5 °F (36.9 °C)  Heart Rate:  [73-98] 98  Resp:  [18-20] 20  BP: (141-153)/(68-92) 153/76  Intake/Output Summary (Last 24 hours) at 04/24/17 1333  Last data filed at 04/24/17 1204   Gross per 24 hour   Intake              760 ml   Output                0 ml   Net              760 ml     Physical Exam:    Constitutional: He is oriented to person, place, and time. He appears well-developed and well-nourished. No distress. Nasal cannula in place.   HENT:   Head: Normocephalic and atraumatic.   Right Ear: External ear normal.   Left Ear: External ear normal.   Nose: Nose normal.   Mouth/Throat: Oropharynx is clear and moist. No oropharyngeal exudate.   Eyes: Conjunctivae and EOM are normal. Pupils are equal, round, and reactive to light. Right eye exhibits no discharge. Left eye exhibits no discharge.   Neck: Normal range of motion. Neck supple. No JVD present.   Cardiovascular: Normal rate and regular rhythm.   No murmur heard.  Pulmonary/Chest: He has decreased breath sounds in the right upper field, the right middle field, the right lower field, the left upper field, the left middle field and the left lower field.   Abdominal: Soft. Bowel sounds are normal.   Musculoskeletal: Normal range of motion. He exhibits edema.   2+ edema   Neurological: He is oriented to person, place, and time. He has normal reflexes. No cranial nerve deficit. Coordination normal.   Skin: Skin is warm and dry. No rash noted.   Psychiatric: He has a normal mood and affect. His behavior is normal. Thought content normal.   Nursing note  and vitals reviewed.         Results from last 7 days  Lab Units 04/23/17  0510 04/22/17  0904   WBC 10*3/mm3 11.74* 20.66*   HEMOGLOBIN g/dL 13.8* 16.0   PLATELETS 10*3/mm3 261 315       Results from last 7 days  Lab Units 04/23/17  0510 04/22/17  0904   SODIUM mmol/L 139 142   POTASSIUM mmol/L 4.5 4.4   BUN mg/dL 15 14   CREATININE mg/dL 0.81 0.75         Blood Culture   Date Value Ref Range Status   04/22/2017 No growth at 2 days  Preliminary   04/22/2017 No growth at 2 days  Preliminary     Recent films:  Imaging Results (last 24 hours)     Procedure Component Value Units Date/Time    XR Chest 1 View [[id number]] Collected:  04/24/17 0801     Updated:  04/24/17 0850    Narrative:       HISTORY: Infiltrate     CXR: Frontal view of the chest is obtained and compared to the  04/22/2017 study.     There is a diminished level of inspiration. Right perihilar opacities  persist. The heart appears borderline enlarged. There is no pleural  effusion or pneumothorax. No acute appearing bony pathology identified.       Impression:       1. Persistent right perihilar opacities. Pneumonia versus asymmetrical  edema considered. Diminished level of inspiration. Follow up would be  helpful.  This report was finalized on 04/24/2017 08:47 by Dr. Ashwini Moser MD.        Films reviewed personally by me.  My interpretation: persistent right sided infiltrate although it does show slight improvement    Pulmonary Assessment:    1. Chronic Alcohol Abuse  2. Suspected Sleep Apnea  3. Morbid Obesity  4. Leukocytosis-improving  5. Reflux  6. Aspiration Pneumonia-better  7. Acute hypoxemic respiratory failure-better  8. Headache    Recommend:     · Decrease frequency of the breathing treatment as the beta agonist could be making his headaches worse  · Wean down FIO2 as tolerated  · Mobilize as much as able  · Agree with the need for an outpatient sleep study once he is stable  · Still not ready for discharge as he is requiring too much FIO2  to be discharged home    Electronically signed by RANDY Davison, 04/24/17, 1:33 PM     Physician substantive contribution:  Pertinent symptoms/interval history include: still has a lot of dyspnea on exertion  Respiratory exam shows pertinent findings of:diminished breath sounds, wheezing, crackles.  Plan includes: continue iv antibiotics, oxygen, ambulation.  Not able to go home.  I have seen and examined patient personally, performing a face-to-face diagnostic evaluation with plan of care reviewed and developed with APRN and nursing staff. I have addended and/or modified the above history of present illness, physical examination, and assessment and plan to reflect my findings and impressions. Essential elements of the care plan were discussed with APRN above.  Agree with findings and assessment/plan as documented above.    Electronically signed by Reyes Beaver MD, on 4/24/2017, 4:54 PM

## 2017-04-24 NOTE — PLAN OF CARE
Problem: Patient Care Overview (Adult)  Goal: Plan of Care Review  Outcome: Ongoing (interventions implemented as appropriate)    04/23/17 1539 04/24/17 0339   Coping/Psychosocial Response Interventions   Plan Of Care Reviewed With patient --    Patient Care Overview   Progress improving --    Outcome Evaluation   Outcome Summary/Follow up Plan --  pt brief desat while asleep, 02 increased to 7L. vss. pt resting comfortably         Problem: Infection, Risk/Actual (Adult)  Goal: Identify Related Risk Factors and Signs and Symptoms  Outcome: Ongoing (interventions implemented as appropriate)  Goal: Infection Prevention/Resolution  Outcome: Ongoing (interventions implemented as appropriate)

## 2017-04-24 NOTE — PROGRESS NOTES
"Pharmacy Anticoagulation Note - Lovenox  Conner Almonte is a 36 y.o. male on Enoxaparin 40 mg SQ Q24H for indication of VTE prophylaxis.    [Ht: 72\" (182.9 cm); Wt: (!) 340 lb (154 kg);  BMI: Body mass index is 46.11 kg/(m^2).]  Est Crcl > 100 (scr 0.81)  Lab Results   Component Value Date    HGB 13.8 (L) 04/23/2017    HGB 16.0 04/22/2017      Lab Results   Component Value Date     04/23/2017     04/22/2017     Assessment/Plan:  Lovenox dose adjusted to 40mg sc q12 based on indication, est crcl greater than 30, and BMI greater than 40. Pharmacy will continue to follow daily and adjust if needed.    CINDY Vila, Pharm.D.    04/24/17 1:30 PM     "

## 2017-04-24 NOTE — PAYOR COMM NOTE
"[id number]  [facility] gabriel Ruiz phone  fax   New admit , please review clinical    Conner Jose (36 y.o. Male)     Date of Birth Social Security Number Address Home Phone MRN    1980  [id number]  [facility] [id number] [phone] [id number]    Oriental orthodox Marital Status          None        Admission Date Admission Type Admitting Provider Attending Provider Department, Room/Bed    4/22/17 Emergency Donovan Jean Baptiste MD [place], James Kyle, MD [facility] 4C, 472/1    Discharge Date Discharge Disposition Discharge Destination                      Attending Provider: Donovan Jean Baptiste MD     Allergies:  No Known Allergies    Isolation:  None   Infection:  None   Code Status:  Not on file    Ht:  72\" (182.9 cm)   Wt:  340 lb (154 kg)    Admission Cmt:  None   Principal Problem:  None                Active Insurance as of 4/22/2017     Primary Coverage     Payor Plan Insurance Group Employer/Plan Group    ANTHEM BLUE CROSS ANTHEM BLUE CROSS BLUE SHIELD PPO [id number]     Payor Plan Address Payor Plan Phone Number Effective From Effective To    [address] [phone] 8/1/2011     [place] [address]       Subscriber Name Subscriber Birth Date Member ID       CONNER JOSE 1980 [id number]                 Emergency Contacts      (Rel.) Home Phone Work Phone Mobile Phone    Leigh Jose (Spouse) [phone] -- --               History & Physical      Gómez Franklin MD at 4/22/2017  1:58 PM           History and Physical      CHIEF COMPLAINT:  SOA, cough    Reason for Admission:  SOA, cough    History Obtained From:  Patient/wife/chart    HISTORY OF PRESENT ILLNESS:      The patient is a 36 y.o. male who came to the ER with SOA, cough. He was intoxicated, and had an episode early this morning of vomiting. He then began coughing and has been SOA. He has an infiltrate on CXR. He was well, until this " "episode this am. He denies recent fever. He has GERD, but it is controlled with medicine. He denies CP. He is c/o HA.     Past Medical History:    Past Medical History:   Diagnosis Date   • Hyperlipidemia    • Hypertension      Past Surgical History:    Past Surgical History:   Procedure Laterality Date   • ADENOIDECTOMY     • CHOLECYSTECTOMY     • TONSILLECTOMY     • TYMPANOSTOMY TUBE PLACEMENT           Medications Prior to Admission:    Prescriptions Prior to Admission   Medication Sig Dispense Refill Last Dose   • amLODIPine-benazepril (LOTREL) 5-40 MG per capsule Take 1 capsule by mouth Daily.   4/20/2017 at 2100   • omeprazole (priLOSEC) 20 MG capsule Take 20 mg by mouth Daily.   4/20/2017 at 0900   • pravastatin (PRAVACHOL) 40 MG tablet Take 40 mg by mouth Daily.   4/20/2017 at 2100       Allergies:  Review of patient's allergies indicates no known allergies.    Social History:   TOBACCO:   reports that he has quit smoking. His smoking use included Cigarettes. He does not have any smokeless tobacco history on file.  ETOH:   reports that he drinks alcohol.  DRUGS:   reports that he does not use illicit drugs.  MARITAL STATUS:    Patient currently lives with Family      Family History:   History reviewed. No pertinent family history.  REVIEW OF SYSTEMS:  Constitutional: neg  CV: neg  Pulmonary: cough, SOA  GI: neg  : neg  Psych: neg  Neuro: headache  Skin: neg  MusculoSkeletal: neg  HEENT: neg  Joints: neg  Vitals:  /83 (BP Location: Right arm, Patient Position: Sitting)  Pulse 102  Temp 99.2 °F (37.3 °C) (Temporal Artery )   Resp 22  Ht 72\" (182.9 cm)  Wt (!) 330 lb (150 kg)  SpO2 95%  BMI 44.76 kg/m2    PHYSICAL EXAM:  Gen: NAD, alert, pleasant, on O2  HEENT: WNL  Lymph: no LAD  Neck: no JVD or masses  Chest: coarse on right   CV: RRR  Abdomen: NT/ND  Extrem: no C/C/E  Neuro: non focal  Skin: no rashes  Joints: no redness  DATA:  I have reviewed the admission labs and imaging " tests.    ASSESSMENT AND PLAN:      Aspiration Pneumonia---continue current antibiotic treatment, consult Pulmonary, continue O2 and wean as able, add                                                 nebulized treatments  Alcohol Abuse----ask Social Service to give patient resources for cessation, monitor for any withdrawal  HTN---monitor blood pressure    Gómez Franklin MD  1:58 PM 4/22/2017     Electronically signed by Gómez Franklin MD at 4/22/2017  2:03 PM           Emergency Department Notes      Mirela Payne RN at 4/22/2017  6:47 AM          Pt states that he got drunk last night.  Pt's wife states that pt vomited around 0030 - 0100 this morning.  Wife states that pt woke her up coughing around 0300 - 0400 this morning.     Mirela Payne RN  04/22/17 0703       Electronically signed by Mirela Payne RN at 4/22/2017  7:03 AM      Rachid Armendariz Jr., MD at 4/22/2017  7:17 AM          Subjective   HPI Comments: Had lot to drink last night but none since midnight.  Did have some vomiting after that.  Woke up with severe cough couple of hours after that.  Has been uncontrolled cough since that time.  Little better now but starts again any time he breathes deep.    Patient is a 36 y.o. male presenting with cough.   History provided by:  Patient and spouse   used: No    Cough   Cough characteristics:  Non-productive and harsh  Severity:  Severe  Onset quality:  Sudden  Duration:  3 hours  Timing:  Intermittent  Progression:  Unchanged  Chronicity:  New  Smoker: no    Context: not animal exposure, not exposure to allergens, not occupational exposure, not sick contacts, not smoke exposure, not upper respiratory infection, not weather changes and not with activity    Relieved by:  Nothing  Worsened by:  Nothing  Ineffective treatments:  None tried  Associated symptoms: no chest pain, no chills, no diaphoresis, no ear fullness, no ear pain, no eye discharge, no fever,  no headaches, no rhinorrhea, no sinus congestion and no weight loss    Risk factors: no chemical exposure, no recent infection and no recent travel        Review of Systems   Constitutional: Negative.  Negative for chills, diaphoresis, fever and weight loss.   HENT: Negative.  Negative for ear pain and rhinorrhea.    Eyes: Negative for discharge.   Respiratory: Positive for cough.    Cardiovascular: Negative.  Negative for chest pain.   Gastrointestinal: Positive for vomiting.   Genitourinary: Negative.    Musculoskeletal: Negative.    Skin: Negative.    Neurological: Negative.  Negative for headaches.   Hematological: Negative.    Psychiatric/Behavioral: Negative.    All other systems reviewed and are negative.      Past Medical History:   Diagnosis Date   • Hyperlipidemia    • Hypertension        No Known Allergies    Past Surgical History:   Procedure Laterality Date   • ADENOIDECTOMY     • CHOLECYSTECTOMY     • TONSILLECTOMY     • TYMPANOSTOMY TUBE PLACEMENT         History reviewed. No pertinent family history.    Social History     Social History   • Marital status:      Spouse name: N/A   • Number of children: N/A   • Years of education: N/A     Social History Main Topics   • Smoking status: Former Smoker   • Smokeless tobacco: None   • Alcohol use Yes      Comment: beer every weekend   • Drug use: No   • Sexual activity: Not Asked     Other Topics Concern   • None     Social History Narrative   • None       Prior to Admission medications    Medication Sig Start Date End Date Taking? Authorizing Provider   AMLODIPINE BESYLATE PO Take 1 tablet by mouth Daily.   Yes Historical Provider, MD   LISINOPRIL PO Take 1 tablet by mouth Daily.   Yes Historical Provider, MD   OMEPRAZOLE PO Take 1 tablet by mouth Daily.   Yes Historical Provider, MD       Medications   sodium chloride 0.9 % flush 10 mL (not administered)   piperacillin-tazobactam (ZOSYN) 4.5 g in iso-osmotic dextrose 100 mL IVPB (premix) (4.5 g  Intravenous New Bag 4/22/17 1021)   vancomycin 1 g/250 mL 0.9% NS (vial-mate) (0 mg Intravenous Stopped 4/22/17 1018)       Vitals:    04/22/17 1044   BP: 151/68   Pulse: 115   Resp: 22   Temp: 98.9 °F (37.2 °C)   SpO2: 98%         Objective   Physical Exam   Constitutional: He is oriented to person, place, and time. He appears well-developed and well-nourished.   HENT:   Head: Normocephalic and atraumatic.   Mouth/Throat: Oropharynx is clear and moist.   Eyes: EOM are normal. Pupils are equal, round, and reactive to light.   Neck: Normal range of motion. Neck supple.   Cardiovascular: Normal rate and regular rhythm.    Pulmonary/Chest: Effort normal and breath sounds normal.   Abdominal: Soft. Bowel sounds are normal. There is no tenderness.   obese   Musculoskeletal: Normal range of motion.   Neurological: He is alert and oriented to person, place, and time.   Skin: Skin is warm and dry.   Psychiatric: He has a normal mood and affect. His behavior is normal.   Nursing note and vitals reviewed.      Procedures        Lab Results (last 24 hours)     Procedure Component Value Units Date/Time    CBC & Differential [[id number]] Collected:  04/22/17 0904    Specimen:  Blood Updated:  04/22/17 0938    Narrative:       The following orders were created for panel order CBC & Differential.  Procedure                               Abnormality         Status                     ---------                               -----------         ------                     CBC Auto Differential[[id number]]         Abnormal            Final result                 Please view results for these tests on the individual orders.    Comprehensive Metabolic Panel [[id number]]  (Abnormal) Collected:  04/22/17 0904    Specimen:  Blood Updated:  04/22/17 0952     Glucose 118 (H) mg/dL      BUN 14 mg/dL      Creatinine 0.75 mg/dL      Sodium 142 mmol/L      Potassium 4.4 mmol/L      Chloride 101 mmol/L      CO2 25.0 mmol/L      Calcium 9.1 mg/dL       Total Protein 7.4 g/dL      Albumin 4.20 g/dL      ALT (SGPT) 42 U/L      AST (SGOT) 36 U/L      Alkaline Phosphatase 73 U/L      Total Bilirubin 0.5 mg/dL      eGFR Non African Amer 117 mL/min/1.73      Globulin 3.2 gm/dL      A/G Ratio 1.3 g/dL      BUN/Creatinine Ratio 18.7     Anion Gap 16.0 (H) mmol/L     Blood Culture [[id number]] Collected:  04/22/17 0904    Specimen:  Blood from Hand, Right Updated:  04/22/17 0933    Blood Culture [[id number]] Collected:  04/22/17 0904    Specimen:  Blood from Arm, Right Updated:  04/22/17 0933    Lactic Acid, Plasma [[id number]]  (Abnormal) Collected:  04/22/17 0904    Specimen:  Blood Updated:  04/22/17 0958     Lactate 2.1 (C) mmol/L     C-reactive Protein [[id number]]  (Normal) Collected:  04/22/17 0904    Specimen:  Blood Updated:  04/22/17 0952     C-Reactive Protein 0.81 mg/dL     CBC Auto Differential [[id number]]  (Abnormal) Collected:  04/22/17 0904    Specimen:  Blood Updated:  04/22/17 0938     WBC 20.66 (H) 10*3/mm3      RBC 5.64 10*6/mm3      Hemoglobin 16.0 g/dL      Hematocrit 47.7 %      MCV 84.6 fL      MCH 28.4 pg      MCHC 33.5 g/dL      RDW 14.8 %      RDW-SD 45.4 fl      MPV 9.4 fL      Platelets 315 10*3/mm3      Neutrophil % 88.2 (H) %      Lymphocyte % 6.1 (L) %      Monocyte % 4.7 %      Eosinophil % 0.4 %      Basophil % 0.1 %      Immature Grans % 0.5 %      Neutrophils, Absolute 18.21 (H) 10*3/mm3      Lymphocytes, Absolute 1.26 10*3/mm3      Monocytes, Absolute 0.97 10*3/mm3      Eosinophils, Absolute 0.08 10*3/mm3      Basophils, Absolute 0.03 10*3/mm3      Immature Grans, Absolute 0.11 (H) 10*3/mm3     Ethanol [[id number]]  (Abnormal) Collected:  04/22/17 0904    Specimen:  Blood Updated:  04/22/17 0952     Ethanol % 0.084 (H) %     Narrative:       Not for legal purposes. Chain of Custody not followed.           XR Chest 2 View   Preliminary Result   Impression:       Ill-defined opacities in the right lung, nonspecific as they may reflect   reactive  changes, aspiration or infectious etiology. Clinical   correlation recommended. There is no focal consolidation.       This report was finalized on  by Dr. Jeannie Harmon MD.          ED Course  ED Course   Comment By Time   Spoke with Dr. Franklin on call for Dr. Jean Baptiste and will admit. Rachid Sauceda Jr., MD 04/22 1111          MDM  Number of Diagnoses or Management Options  Pneumonia of right lung due to infectious organism, unspecified part of lung: new and requires workup     Amount and/or Complexity of Data Reviewed  Clinical lab tests: ordered and reviewed  Tests in the radiology section of CPT®:  ordered and reviewed    Risk of Complications, Morbidity, and/or Mortality  Presenting problems: moderate  Diagnostic procedures: moderate  Management options: moderate    Patient Progress  Patient progress: stable      Final diagnoses:   Pneumonia of right lung due to infectious organism, unspecified part of lung        Rachid Sauceda Jr., MD  04/22/17 1111       Electronically signed by Rachid Sauceda Jr., MD at 4/22/2017 11:11 AM      Jacqueline Nicholson RN at 4/22/2017  9:00 AM          PT IS SLEEPING AND HAS NOTED APNEIC EPISODE WITH INCREASED RESPIRATION. PT PLACED ON O2 MONITOR AND SATS 84% RA & RR 40. PT IS MOUTH BREATHING. NOTIFIED DR. SAUCEDA. PT PLACED ON NR MASK AT 12LPM  PER DR. SAUCEDA. PT'S SATS INCREASED TO 95%.      Jacqueline Nicholson RN  04/22/17 0928       Electronically signed by Jacqueline Nicholson RN at 4/22/2017  9:28 AM        Hospital Medications (active)       Dose Frequency Start End    acetaminophen (TYLENOL) tablet 650 mg 650 mg Every 6 Hours PRN 4/22/2017     Sig - Route: Take 2 tablets by mouth Every 6 (Six) Hours As Needed for Mild Pain (1-3). - Oral    amLODIPine (NORVASC) tablet 5 mg 5 mg Every 24 Hours Scheduled 4/22/2017     Sig - Route: Take 1 tablet by mouth Daily. - Oral    benazepril (LOTENSIN) tablet 40 mg 40 mg Every 24 Hours Scheduled 4/22/2017     Sig - Route: Take 2 tablets by mouth  "Daily. - Oral    enoxaparin (LOVENOX) syringe 40 mg 40 mg Every 24 Hours 4/22/2017     Sig - Route: Inject 0.4 mL under the skin Daily. - Subcutaneous    ipratropium-albuterol (DUO-NEB) nebulizer solution 3 mL 3 mL 4 Times Daily - RT 4/22/2017     Sig - Route: Take 3 mL by nebulization 4 (Four) Times a Day. - Nebulization    ondansetron (ZOFRAN) injection 4 mg 4 mg Every 6 Hours PRN 4/22/2017     Sig - Route: Infuse 2 mL into a venous catheter Every 6 (Six) Hours As Needed for Nausea or Vomiting. - Intravenous    pantoprazole (PROTONIX) EC tablet 40 mg 40 mg Every Early Morning 4/23/2017     Sig - Route: Take 1 tablet by mouth Every Morning. - Oral    Notes to Pharmacy: Give dose now, then tomorrow can start in am    sodium chloride 0.9 % flush 10 mL 10 mL As Needed 4/22/2017     Sig - Route: Infuse 10 mL into a venous catheter As Needed for Line Care. - Intravenous    Linked Group 1:  \"And\" Linked Group Details                 Physician Progress Notes (last 72 hours) (Notes from 4/21/2017  9:53 AM through 4/24/2017  9:53 AM)      Gómez Franklin MD at 4/23/2017  6:18 PM  Version 1 of 1         Progress Note  Conner Almonte  4/23/2017 6:19 PM  Subjective:   Admit Date:   4/22/2017      CC/ADMIT DX:       Interval History:   Reviewed overnight events and nursing notes.  He is improving. His cough is better. He has less SOA.        I have reviewed all labs/diagnostics from the last 24hrs.       ROS:   I have done a 10 point ROS and all are negative, except what is mentioned in the HPI.    Diet Regular    Medications:        amLODIPine 5 mg Oral Q24H   benazepril 40 mg Oral Q24H   enoxaparin 40 mg Subcutaneous Q24H   ipratropium-albuterol 3 mL Nebulization 4x Daily - RT   pantoprazole 40 mg Oral Q AM           Objective:   Vitals: /71 (BP Location: Left arm, Patient Position: Sitting)  Pulse 96  Temp 98 °F (36.7 °C) (Oral)   Resp 20  Ht 72\" (182.9 cm)  Wt (!) 337 lb (153 kg)  SpO2 100%  BMI 45.71 " kg/m2   Intake/Output Summary (Last 24 hours) at 04/23/17 1819  Last data filed at 04/23/17 1814   Gross per 24 hour   Intake             1960 ml   Output             1850 ml   Net              110 ml     General appearance: alert and cooperative with exam  Lungs: relatively clear, on O2  Heart: regular rate and rhythm, S1, S2 normal, no murmur, click, rub or gallop  Abdomen: soft, non-tender; bowel sounds normal; no masses,  no organomegaly  Extremities: extremities normal, atraumatic, no cyanosis or edema  Neurologic:  No obvious focal neurologic deficits.    Assessment and Plan:   Active Problems:    Pneumonia of right lung due to infectious organism    Aspiration    GERD    Probable CECE    Hypoxia    Alcohol Abuse      Plan:  1.  Continue present medication(s)   2.  Will need Sleep study as outpatient  3.  Continue care. Wean O2  4.  Follow with Pulmonology      Discharge planning:   home     Reviewed treatment plans with the patient and/or family.             Electronically signed by Gómez Franklin MD on 4/23/2017 at 6:19 PM       Electronically signed by Gómez Franklin MD at 4/23/2017  6:20 PM        Esteban Urbina RN Registered Nurse Signed  Plan of Care Date of Service: 4/24/2017  3:40 AM         Problem: Patient Care Overview (Adult)  Goal: Plan of Care Review  Outcome: Ongoing (interventions implemented as appropriate)     04/23/17 1539 04/24/17 0339   Coping/Psychosocial Response Interventions   Plan Of Care Reviewed With patient --    Patient Care Overview   Progress improving --    Outcome Evaluation   Outcome Summary/Follow up Plan --  pt brief desat while asleep, 02 increased to 7L. vss. pt resting comfortably          Problem: Infection, Risk/Actual (Adult)  Goal: Identify Related Risk Factors and Signs and Symptoms  Outcome: Ongoing (interventions implemented as appropriate)  Goal: Infection Prevention/Resolution  Outcome: Ongoing (interventions implemented as appropriate)                                  Consult Notes (last 72 hours) (Notes from 2017  9:53 AM through 2017  9:53 AM)      Reyes Beaver MD at 2017  8:46 AM  Version 2 of 2     Consult Orders:    1. Inpatient Consult to Pulmonology [[id number]] ordered by Gómez Franklin MD at 17 1407                    PULMONARY & CRITICAL CARE CONSULT - [facility]    17, 8:46 AM  Patient Care Team:  Doonvan Jean Baptiste MD as PCP - General (Family Medicine)  Name: Conner Almonte  : 1980  MRN: [id number]  Contact Serial Number [id number]    Chief complaint: Pneumonia    HPI:  We have been consulted by Donovan Jean Baptiste MD to see this 36 y.o. year old male born on 1980.  Patient complains of cough in the chest for 2 days. Severity: moderate.  Aggravating factors: deep inspiration.   Alleviating factors: medication(s) (albuterol and atrovent) Associated symptoms: chest pain, poor exercise tolerance and vomiting. Sputum is scant.  Patient currently is not on home oxygen therapy.. Respiratory history: no history of pneumonia or bronchitis  Patient history include chronic alcohol abuse. Apparently drank some amount of alcohol on Friday and came in to the emergency room intoxicated and complaining of a persistent cough. There was some question as to whether or not he aspirated while vomiting the night before. He did not confirm this and was unsure. He does report a history of reflux issues that is being treated. He reports that in some ways he feels worse today than he did when he came in. He denies feeling overly short of breath although he is requiring 11L high flow oxygen. He just reports feeling fatigue and soreness in his chest from coughing so much. He denies any lung history but his wife reports that he snores a lot, has fatigue at times and they were going to set him up for a sleep study but that never happened. No other aggravating, alleviating factors or associated symptoms.    Past  Medical History:  Past Medical History:   Diagnosis Date   • Hyperlipidemia    • Hypertension      Past Surgical History:   Procedure Laterality Date   • ADENOIDECTOMY     • CHOLECYSTECTOMY     • TONSILLECTOMY     • TYMPANOSTOMY TUBE PLACEMENT       No Known Allergies  Medications:    amLODIPine 5 mg Oral Q24H   benazepril 40 mg Oral Q24H   enoxaparin 40 mg Subcutaneous Q24H   ipratropium-albuterol 3 mL Nebulization 4x Daily - RT   pantoprazole 40 mg Oral Q AM        Family History:  History reviewed. No pertinent family history.  Social History:   reports that he has quit smoking. His smoking use included Cigarettes. He does not have any smokeless tobacco history on file. He reports that he drinks alcohol. He reports that he does not use illicit drugs.  Review of Systems:  Review of Systems   Constitutional: Positive for activity change, appetite change and fatigue. Negative for chills, diaphoresis and unexpected weight change.   HENT: Positive for congestion. Negative for ear pain, nosebleeds, sneezing and trouble swallowing.    Eyes: Negative for pain, discharge and itching.   Respiratory: Positive for cough, choking and chest tightness. Negative for apnea, shortness of breath and wheezing.    Cardiovascular: Positive for leg swelling. Negative for chest pain and palpitations.   Gastrointestinal: Negative for abdominal distention, constipation, nausea and vomiting.   Endocrine: Negative for cold intolerance, polydipsia, polyphagia and polyuria.   Genitourinary: Negative for difficulty urinating, hematuria, penile swelling and testicular pain.   Musculoskeletal: Negative for arthralgias, joint swelling and myalgias.   Skin: Negative for color change, pallor and rash.   Allergic/Immunologic: Negative for environmental allergies, food allergies and immunocompromised state.   Neurological: Positive for weakness. Negative for dizziness and light-headedness.   Hematological: Negative for adenopathy. Does not  bruise/bleed easily.   Psychiatric/Behavioral: Negative for confusion and hallucinations. The patient is not nervous/anxious.    All other systems reviewed and are negative.     Physical Exam:  Temp:  [98.2 °F (36.8 °C)-100.4 °F (38 °C)] 99.5 °F (37.5 °C)  Heart Rate:  [] 77  Resp:  [16-39] 20  BP: (103-152)/(60-95) 124/65  Intake/Output Summary (Last 24 hours) at 04/23/17 0846  Last data filed at 04/23/17 0419   Gross per 24 hour   Intake              840 ml   Output             1300 ml   Net             -460 ml     Last 3 weights    04/22/17  0642 04/22/17  1929   Weight: (!) 330 lb (150 kg) (!) 337 lb (153 kg)     SpO2 Readings from Last 3 Encounters:   04/23/17 97%     Physical Exam   Constitutional: He is oriented to person, place, and time. He appears well-developed and well-nourished. No distress. Nasal cannula in place.   HENT:   Head: Normocephalic and atraumatic.   Right Ear: External ear normal.   Left Ear: External ear normal.   Nose: Nose normal.   Mouth/Throat: Oropharynx is clear and moist. No oropharyngeal exudate.   Eyes: Conjunctivae and EOM are normal. Pupils are equal, round, and reactive to light. Right eye exhibits no discharge. Left eye exhibits no discharge.   Neck: Normal range of motion. Neck supple. No JVD present.   Cardiovascular: Normal rate and regular rhythm.    No murmur heard.  Pulmonary/Chest: He has decreased breath sounds in the right upper field, the right middle field, the right lower field, the left upper field, the left middle field and the left lower field.   Abdominal: Soft. Bowel sounds are normal.   Musculoskeletal: Normal range of motion. He exhibits edema.   2+ edema   Neurological: He is oriented to person, place, and time. He has normal reflexes. No cranial nerve deficit. Coordination normal.   Skin: Skin is warm and dry. No rash noted.   Psychiatric: He has a normal mood and affect. His behavior is normal. Thought content normal.   Nursing note and vitals  reviewed.      Results from last 7 days  Lab Units 04/23/17  0510 04/22/17  0904   WBC 10*3/mm3 11.74* 20.66*   HEMOGLOBIN g/dL 13.8* 16.0   PLATELETS 10*3/mm3 261 315       Results from last 7 days  Lab Units 04/23/17  0510 04/22/17  0904   SODIUM mmol/L 139 142   POTASSIUM mmol/L 4.5 4.4   TOTAL CO2 mmol/L 30.0 25.0   BUN mg/dL 15 14   CREATININE mg/dL 0.81 0.75   GLUCOSE mg/dL 161* 118*         No results found for: PROBNP  Blood Culture   Date Value Ref Range Status   04/22/2017 No growth at less than 24 hours  Preliminary   04/22/2017 No growth at less than 24 hours  Preliminary     Recent radiology:   Imaging Results (last 72 hours)     Procedure Component Value Units Date/Time    XR Chest 2 View [[id number]] Collected:  04/22/17 0842     Updated:  04/22/17 1332    Narrative:       Exam:   XR CHEST 2 VW-       Date:  04/22/2017      History:  Male, age  36 years;cough post vomiting     COMPARISON:  None.     Findings :     Borderline size of the cardiomediastinal silhouette. Ill-defined  opacities in the right lung, without pleural effusion or pneumothorax.   The bones show no acute pathology.         Impression:       Impression:     Ill-defined opacities in the right lung, nonspecific as they may reflect  reactive changes, aspiration or infectious etiology. Clinical  correlation recommended. There is no focal consolidation.     This report was finalized on 04/22/2017 13:29 by Dr. Jeannie Harmon MD.        My radiograph interpretation/independent review of imaging: agree with right sided infiltrate, consistent with an aspiration event    Other test results (not lab or imaging): none    Independent review of ekg: not done    Patient Active Problem List   Diagnosis   • Pneumonia of right lung due to infectious organism     Pulmonary Assessment:    New problem (to me), with additional workup planned: Acute Hypoxemic Respiratory Failure, Aspiration Pneumonia    New problem (to me), no additional workup planned:  none    Other problems either stable, failing to improve or worsenin. Chronic Alcohol Abuse  2. Suspected Sleep Apnea  3. Morbid Obesity  4. Leukocytosis-improving  5. Reflux    Recommend/plan:   · Wean down high flow oxygen as tolerated  · Will order sputum culture an gram stain  · Continue intravenous antibiotic coverage  · Mobilize as tolerated  · Will defer treatment of reflux and alcohol abuse to his attending  · Would recommend a sleep study outpatient once he is stable enough to do so given his leg edema  · Does not need to be treated with intravenous steroids as this will worsen his edema as well as his reflux  · Will order follow-up cxr for in the morning    Electronically signed by RANDY Davison, 17, 8:46 AM    Physician substantive contribution:  Pertinent symptoms/interval history include: shortness of breath with exertion  Respiratory exam shows pertinent findings of:crackles.  Plan includes: continue oxygen.  Aa gradient is significant currently.  Follow oxygenation, repeat cxr to evaluate for any worsening or complications.  EtoH puts him at higher risk for complications from organisms such as pneumococcus.  I have seen and examined patient personally, performing a face-to-face diagnostic evaluation with plan of care reviewed and developed with APRN and nursing staff. I have addended and/or modified the above history of present illness, physical examination, and assessment and plan to reflect my findings and impressions. Essential elements of the care plan were discussed with APRN above.  Agree with findings and assessment/plan as documented above.    Electronically signed by Reyes Beaver MD, on 2017, 12:53 PM             Electronically signed by Reyes Beaver MD at 2017 12:56 PM      RANDY Corcoran at 2017  8:46 AM  Version 1 of 2             PULMONARY & CRITICAL CARE CONSULT - [facility]    17, 8:46 AM  Patient Care  Team:  Donovan Jean Baptiste MD as PCP - General (Family Medicine)  Name: Conner Almonte  : 1980  MRN: [id number]  Contact Serial Number [id number]    Chief complaint: Pneumonia    HPI:  We have been consulted by Donovan Jean Baptiste MD to see this 36 y.o. year old male born on 1980.  Patient complains of cough in the chest for 2 days. Severity: moderate.  Aggravating factors: deep inspiration.   Alleviating factors: medication(s) (albuterol and atrovent) Associated symptoms: chest pain, poor exercise tolerance and vomiting. Sputum is scant.  Patient currently is not on home oxygen therapy.. Respiratory history: no history of pneumonia or bronchitis  Patient history include chronic alcohol abuse. Apparently drank some amount of alcohol on Friday and came in to the emergency room intoxicated and complaining of a persistent cough. There was some question as to whether or not he aspirated while vomiting the night before. He did not confirm this and was unsure. He does report a history of reflux issues that is being treated. He reports that in some ways he feels worse today than he did when he came in. He denies feeling overly short of breath although he is requiring 11L high flow oxygen. He just reports feeling fatigue and soreness in his chest from coughing so much. He denies any lung history but his wife reports that he snores a lot, has fatigue at times and they were going to set him up for a sleep study but that never happened. No other aggravating, alleviating factors or associated symptoms.    Past Medical History:  Past Medical History:   Diagnosis Date   • Hyperlipidemia    • Hypertension      Past Surgical History:   Procedure Laterality Date   • ADENOIDECTOMY     • CHOLECYSTECTOMY     • TONSILLECTOMY     • TYMPANOSTOMY TUBE PLACEMENT       No Known Allergies  Medications:    amLODIPine 5 mg Oral Q24H   benazepril 40 mg Oral Q24H   enoxaparin 40 mg Subcutaneous Q24H   ipratropium-albuterol 3 mL  Nebulization 4x Daily - RT   pantoprazole 40 mg Oral Q AM        Family History:  History reviewed. No pertinent family history.  Social History:   reports that he has quit smoking. His smoking use included Cigarettes. He does not have any smokeless tobacco history on file. He reports that he drinks alcohol. He reports that he does not use illicit drugs.  Review of Systems:  Review of Systems   Constitutional: Positive for activity change, appetite change and fatigue. Negative for chills, diaphoresis and unexpected weight change.   HENT: Positive for congestion. Negative for ear pain, nosebleeds, sneezing and trouble swallowing.    Eyes: Negative for pain, discharge and itching.   Respiratory: Positive for cough, choking and chest tightness. Negative for apnea, shortness of breath and wheezing.    Cardiovascular: Positive for leg swelling. Negative for chest pain and palpitations.   Gastrointestinal: Negative for abdominal distention, constipation, nausea and vomiting.   Endocrine: Negative for cold intolerance, polydipsia, polyphagia and polyuria.   Genitourinary: Negative for difficulty urinating, hematuria, penile swelling and testicular pain.   Musculoskeletal: Negative for arthralgias, joint swelling and myalgias.   Skin: Negative for color change, pallor and rash.   Allergic/Immunologic: Negative for environmental allergies, food allergies and immunocompromised state.   Neurological: Positive for weakness. Negative for dizziness and light-headedness.   Hematological: Negative for adenopathy. Does not bruise/bleed easily.   Psychiatric/Behavioral: Negative for confusion and hallucinations. The patient is not nervous/anxious.    All other systems reviewed and are negative.     Physical Exam:  Temp:  [98.2 °F (36.8 °C)-100.4 °F (38 °C)] 99.5 °F (37.5 °C)  Heart Rate:  [] 77  Resp:  [16-39] 20  BP: (103-152)/(60-95) 124/65  Intake/Output Summary (Last 24 hours) at 04/23/17 0846  Last data filed at 04/23/17  0419   Gross per 24 hour   Intake              840 ml   Output             1300 ml   Net             -460 ml     Last 3 weights    04/22/17  0642 04/22/17  1929   Weight: (!) 330 lb (150 kg) (!) 337 lb (153 kg)     SpO2 Readings from Last 3 Encounters:   04/23/17 97%     Physical Exam   Constitutional: He is oriented to person, place, and time. He appears well-developed and well-nourished. No distress. Nasal cannula in place.   HENT:   Head: Normocephalic and atraumatic.   Right Ear: External ear normal.   Left Ear: External ear normal.   Nose: Nose normal.   Mouth/Throat: Oropharynx is clear and moist. No oropharyngeal exudate.   Eyes: Conjunctivae and EOM are normal. Pupils are equal, round, and reactive to light. Right eye exhibits no discharge. Left eye exhibits no discharge.   Neck: Normal range of motion. Neck supple. No JVD present.   Cardiovascular: Normal rate and regular rhythm.    No murmur heard.  Pulmonary/Chest: He has decreased breath sounds in the right upper field, the right middle field, the right lower field, the left upper field, the left middle field and the left lower field.   Abdominal: Soft. Bowel sounds are normal.   Musculoskeletal: Normal range of motion. He exhibits edema.   2+ edema   Neurological: He is oriented to person, place, and time. He has normal reflexes. No cranial nerve deficit. Coordination normal.   Skin: Skin is warm and dry. No rash noted.   Psychiatric: He has a normal mood and affect. His behavior is normal. Thought content normal.   Nursing note and vitals reviewed.      Results from last 7 days  Lab Units 04/23/17  0510 04/22/17  0904   WBC 10*3/mm3 11.74* 20.66*   HEMOGLOBIN g/dL 13.8* 16.0   PLATELETS 10*3/mm3 261 315       Results from last 7 days  Lab Units 04/23/17  0510 04/22/17  0904   SODIUM mmol/L 139 142   POTASSIUM mmol/L 4.5 4.4   TOTAL CO2 mmol/L 30.0 25.0   BUN mg/dL 15 14   CREATININE mg/dL 0.81 0.75   GLUCOSE mg/dL 161* 118*         No results found  for: PROBNP  Blood Culture   Date Value Ref Range Status   2017 No growth at less than 24 hours  Preliminary   2017 No growth at less than 24 hours  Preliminary     Recent radiology:   Imaging Results (last 72 hours)     Procedure Component Value Units Date/Time    XR Chest 2 View [[id number]] Collected:  17 0842     Updated:  17 1332    Narrative:       Exam:   XR CHEST 2 VW-       Date:  2017      History:  Male, age  36 years;cough post vomiting     COMPARISON:  None.     Findings :     Borderline size of the cardiomediastinal silhouette. Ill-defined  opacities in the right lung, without pleural effusion or pneumothorax.   The bones show no acute pathology.         Impression:       Impression:     Ill-defined opacities in the right lung, nonspecific as they may reflect  reactive changes, aspiration or infectious etiology. Clinical  correlation recommended. There is no focal consolidation.     This report was finalized on 2017 13:29 by Dr. Jeannie Harmon MD.        My radiograph interpretation/independent review of imaging: agree with right sided infiltrate, consistent with an aspiration event    Other test results (not lab or imaging): none    Independent review of ekg: not done    Patient Active Problem List   Diagnosis   • Pneumonia of right lung due to infectious organism     Pulmonary Assessment:    New problem (to me), with additional workup planned: Acute Hypoxemic Respiratory Failure, Aspiration Pneumonia    New problem (to me), no additional workup planned: none    Other problems either stable, failing to improve or worsenin. Chronic Alcohol Abuse  2. Suspected Sleep Apnea  3. Morbid Obesity  4. Leukocytosis-improving  5. Reflux    Recommend/plan:     · Wean down high flow oxygen as tolerated  · Will order sputum culture an gram stain  · Continue intravenous antibiotic coverage  · Mobilize as tolerated  · Will defer treatment of reflux and alcohol abuse to his  attending  · Would recommend a sleep study outpatient once he is stable enough to do so given his leg edema  · Does not need to be treated with intravenous steroids as this will worsen his edema as well as his reflux  · Will order follow-up cxr for in the morning    Electronically signed by RANDY Davison, 04/23/17, 8:46 AM           Electronically signed by RANDY Corcoran at 4/23/2017  9:02 AM

## 2017-04-25 ENCOUNTER — APPOINTMENT (OUTPATIENT)
Dept: CARDIOLOGY | Facility: HOSPITAL | Age: 37
End: 2017-04-25
Attending: FAMILY MEDICINE

## 2017-04-25 LAB
BH CV ECHO MEAS - AO MAX PG (FULL): 0.99 MMHG
BH CV ECHO MEAS - AO MAX PG: 10.1 MMHG
BH CV ECHO MEAS - AO MEAN PG (FULL): 1 MMHG
BH CV ECHO MEAS - AO MEAN PG: 5 MMHG
BH CV ECHO MEAS - AO ROOT AREA (BSA CORRECTED): 1.2
BH CV ECHO MEAS - AO ROOT AREA: 8.6 CM^2
BH CV ECHO MEAS - AO ROOT DIAM: 3.3 CM
BH CV ECHO MEAS - AO V2 MAX: 159 CM/SEC
BH CV ECHO MEAS - AO V2 MEAN: 95.1 CM/SEC
BH CV ECHO MEAS - AO V2 VTI: 33.9 CM
BH CV ECHO MEAS - AVA(I,A): 3.4 CM^2
BH CV ECHO MEAS - AVA(I,D): 3.4 CM^2
BH CV ECHO MEAS - AVA(V,A): 3.3 CM^2
BH CV ECHO MEAS - AVA(V,D): 3.3 CM^2
BH CV ECHO MEAS - BSA(HAYCOCK): 2.8 M^2
BH CV ECHO MEAS - BSA: 2.6 M^2
BH CV ECHO MEAS - BZI_BMI: 45.2 KILOGRAMS/M^2
BH CV ECHO MEAS - BZI_METRIC_HEIGHT: 182.9 CM
BH CV ECHO MEAS - BZI_METRIC_WEIGHT: 151 KG
BH CV ECHO MEAS - CONTRAST EF 4CH: 67.9 ML/M^2
BH CV ECHO MEAS - EDV(CUBED): 97.3 ML
BH CV ECHO MEAS - EDV(MOD-SP4): 185 ML
BH CV ECHO MEAS - EDV(TEICH): 97.3 ML
BH CV ECHO MEAS - EF(CUBED): 79.8 %
BH CV ECHO MEAS - EF(MOD-SP4): 67.9 %
BH CV ECHO MEAS - EF(TEICH): 72.2 %
BH CV ECHO MEAS - ESV(CUBED): 19.7 ML
BH CV ECHO MEAS - ESV(MOD-SP4): 59.4 ML
BH CV ECHO MEAS - ESV(TEICH): 27 ML
BH CV ECHO MEAS - FS: 41.3 %
BH CV ECHO MEAS - IVS/LVPW: 1
BH CV ECHO MEAS - IVSD: 1.4 CM
BH CV ECHO MEAS - LA DIMENSION: 4.6 CM
BH CV ECHO MEAS - LA/AO: 1.4
BH CV ECHO MEAS - LAT PEAK E' VEL: 11.5 CM/SEC
BH CV ECHO MEAS - LV DIASTOLIC VOL/BSA (35-75): 69.9 ML/M^2
BH CV ECHO MEAS - LV MASS(C)D: 256.8 GRAMS
BH CV ECHO MEAS - LV MASS(C)DI: 97 GRAMS/M^2
BH CV ECHO MEAS - LV MAX PG: 9.1 MMHG
BH CV ECHO MEAS - LV MEAN PG: 4 MMHG
BH CV ECHO MEAS - LV SYSTOLIC VOL/BSA (12-30): 22.5 ML/M^2
BH CV ECHO MEAS - LV V1 MAX: 151 CM/SEC
BH CV ECHO MEAS - LV V1 MEAN: 81.5 CM/SEC
BH CV ECHO MEAS - LV V1 VTI: 33.7 CM
BH CV ECHO MEAS - LVIDD: 4.6 CM
BH CV ECHO MEAS - LVIDS: 2.7 CM
BH CV ECHO MEAS - LVLD AP4: 8.8 CM
BH CV ECHO MEAS - LVLS AP4: 7 CM
BH CV ECHO MEAS - LVOT AREA (M): 3.5 CM^2
BH CV ECHO MEAS - LVOT AREA: 3.5 CM^2
BH CV ECHO MEAS - LVOT DIAM: 2.1 CM
BH CV ECHO MEAS - LVPWD: 1.4 CM
BH CV ECHO MEAS - MED PEAK E' VEL: 5.44 CM/SEC
BH CV ECHO MEAS - MV A MAX VEL: 71.3 CM/SEC
BH CV ECHO MEAS - MV DEC TIME: 0.23 SEC
BH CV ECHO MEAS - MV E MAX VEL: 83.9 CM/SEC
BH CV ECHO MEAS - MV E/A: 1.2
BH CV ECHO MEAS - SI(AO): 109.6 ML/M^2
BH CV ECHO MEAS - SI(CUBED): 29.3 ML/M^2
BH CV ECHO MEAS - SI(LVOT): 44.1 ML/M^2
BH CV ECHO MEAS - SI(MOD-SP4): 47.5 ML/M^2
BH CV ECHO MEAS - SI(TEICH): 26.6 ML/M^2
BH CV ECHO MEAS - SV(AO): 289.9 ML
BH CV ECHO MEAS - SV(CUBED): 77.7 ML
BH CV ECHO MEAS - SV(LVOT): 116.7 ML
BH CV ECHO MEAS - SV(MOD-SP4): 125.6 ML
BH CV ECHO MEAS - SV(TEICH): 70.3 ML
E/E' RATIO: 15.4
LEFT ATRIUM VOLUME INDEX: 18.4 ML/M2
LEFT ATRIUM VOLUME: 48.7 CM3

## 2017-04-25 PROCEDURE — 25010000002 ENOXAPARIN PER 10 MG: Performed by: FAMILY MEDICINE

## 2017-04-25 PROCEDURE — 25010000002 LEVOFLOXACIN PER 250 MG: Performed by: FAMILY MEDICINE

## 2017-04-25 PROCEDURE — 25010000002 PIPERACILLIN SOD-TAZOBACTAM PER 1 G: Performed by: FAMILY MEDICINE

## 2017-04-25 PROCEDURE — 94799 UNLISTED PULMONARY SVC/PX: CPT

## 2017-04-25 PROCEDURE — 93306 TTE W/DOPPLER COMPLETE: CPT | Performed by: INTERNAL MEDICINE

## 2017-04-25 PROCEDURE — C8929 TTE W OR WO FOL WCON,DOPPLER: HCPCS

## 2017-04-25 PROCEDURE — 94762 N-INVAS EAR/PLS OXIMTRY CONT: CPT

## 2017-04-25 PROCEDURE — 25010000002 PERFLUTREN (DEFINITY) 8.476 MG IN SODIUM CHLORIDE 10 ML INJECTION: Performed by: FAMILY MEDICINE

## 2017-04-25 RX ADMIN — TAZOBACTAM SODIUM AND PIPERACILLIN SODIUM 3.38 G: 375; 3 INJECTION, SOLUTION INTRAVENOUS at 08:20

## 2017-04-25 RX ADMIN — LEVOFLOXACIN 500 MG: 500 INJECTION, SOLUTION INTRAVENOUS at 21:24

## 2017-04-25 RX ADMIN — IPRATROPIUM BROMIDE AND ALBUTEROL SULFATE 3 ML: .5; 3 SOLUTION RESPIRATORY (INHALATION) at 14:55

## 2017-04-25 RX ADMIN — TAZOBACTAM SODIUM AND PIPERACILLIN SODIUM 3.38 G: 375; 3 INJECTION, SOLUTION INTRAVENOUS at 01:54

## 2017-04-25 RX ADMIN — AMLODIPINE BESYLATE 5 MG: 5 TABLET ORAL at 08:20

## 2017-04-25 RX ADMIN — BENAZEPRIL HYDROCHLORIDE 40 MG: 20 TABLET, COATED ORAL at 08:20

## 2017-04-25 RX ADMIN — ENOXAPARIN SODIUM 40 MG: 40 INJECTION SUBCUTANEOUS at 17:12

## 2017-04-25 RX ADMIN — TAZOBACTAM SODIUM AND PIPERACILLIN SODIUM 3.38 G: 375; 3 INJECTION, SOLUTION INTRAVENOUS at 13:31

## 2017-04-25 RX ADMIN — PANTOPRAZOLE SODIUM 40 MG: 40 TABLET, DELAYED RELEASE ORAL at 05:43

## 2017-04-25 RX ADMIN — IPRATROPIUM BROMIDE AND ALBUTEROL SULFATE 3 ML: .5; 3 SOLUTION RESPIRATORY (INHALATION) at 19:29

## 2017-04-25 RX ADMIN — SODIUM CHLORIDE 8 ML: 9 INJECTION INTRAMUSCULAR; INTRAVENOUS; SUBCUTANEOUS at 10:32

## 2017-04-25 RX ADMIN — TAZOBACTAM SODIUM AND PIPERACILLIN SODIUM 3.38 G: 375; 3 INJECTION, SOLUTION INTRAVENOUS at 19:48

## 2017-04-25 RX ADMIN — IPRATROPIUM BROMIDE AND ALBUTEROL SULFATE 3 ML: .5; 3 SOLUTION RESPIRATORY (INHALATION) at 07:42

## 2017-04-25 NOTE — PROGRESS NOTES
"[facility]  Daily Progress Note  Conner Almonte  MRN: [id number] LOS: 3    Admit Date: 4/22/2017 4/25/2017 4:41 PM    Subjective:          Chief Complaint:  Chief Complaint   Patient presents with   • Cough       Interval History:    Reviewed overnight events and nursing notes.   Status:  better than before  Pain:  complete resolution    Slowly improving, still 02 dependant    ROS:  10 point ROS obtained:   Gen: No fevers  HEENT: No migraine or visual change  Lung: No cough, hemopotysis or wheezing  Cv: No chest pain or pressure  Abd: No nausea or vomit, no change in bowel fct, no gi bleeding  Ext: No inc pain or swelling  Neuro: No seizure or syncope  Skin: No new rashes  Endo: No polyuria, polyphagia or poilydypsia  Psyc: No inc anxiety or depression  MS: No increase in joint pain or swelling reported    DIET:  Diet Regular    Medications:        amLODIPine 5 mg Oral Q24H   benazepril 40 mg Oral Q24H   enoxaparin 40 mg Subcutaneous Q12H   ipratropium-albuterol 3 mL Nebulization TID - RT   levoFLOXacin 500 mg Intravenous Q24H   pantoprazole 40 mg Oral Q AM   piperacillin-tazobactam 3.375 g Intravenous Q6H       Data:     Code Status: Full Code    History reviewed. No pertinent family history.  Social History     Social History   • Marital status:      Spouse name: N/A   • Number of children: N/A   • Years of education: N/A     Occupational History   • Not on file.     Social History Main Topics   • Smoking status: Former Smoker     Types: Cigarettes   • Smokeless tobacco: Not on file   • Alcohol use Yes      Comment: beer every weekend   • Drug use: No   • Sexual activity: Defer     Other Topics Concern   • Not on file     Social History Narrative   • No narrative on file         Objective:     Vitals: /86 (BP Location: Left arm, Patient Position: Sitting)  Pulse 101  Temp 97.3 °F (36.3 °C) (Temporal Artery )   Resp 20  Ht 72\" (182.9 cm)  Wt (!) 333 lb 12.8 oz (151 kg)  SpO2 93%  " BMI 45.27 kg/m2     Intake/Output Summary (Last 24 hours) at 17 1641  Last data filed at 17 1300   Gross per 24 hour   Intake             1530 ml   Output             1450 ml   Net               80 ml    Temp (24hrs), Av.1 °F (36.7 °C), Min:97.3 °F (36.3 °C), Max:99 °F (37.2 °C)    Glucose:  @LABRCNT(pocglu:4)@  Physical Examination:   General appearance - alert, well appearing, and in no distress and oriented to person, place, and time  Mental status - alert, oriented to person, place, and time, normal mood, behavior, speech, dress, motor activity, and thought processes  Eyes - pupils equal and reactive, extraocular eye movements intact  Ears - bilateral TM's and external ear canals normal, hearing grossly normal bilaterally  Mouth - mucous membranes moist, pharynx normal without lesions  Neck - supple, no significant adenopathy  Lymphatics - no palpable lymphadenopathy, no hepatosplenomegaly  Chest - DIMINISHED RALES  Heart - normal rate, regular rhythm, normal S1, S2, no murmurs, rubs, clicks or gallops  Abdomen - soft, nontender, nondistended, no masses or organomegaly bowel sounds normal  Neurological - alert, oriented, normal speech, no focal findings or movement disorder noted  Musculoskeletal - no joint tenderness, deformity or swelling  Extremities - peripheral pulses normal, no pedal edema, no clubbing or cyanosis  Skin - normal coloration and turgor, no rashes, no suspicious skin lesions noted      Assessment and Plan:     Primary Problem:  ASPIRATION PNEUMONIA  ACUTE RESPIRATORY FAILURE  Hospital Problem list:  Active Problems:    Pneumonia of right lung due to infectious organism      PMH:  Past Medical History:   Diagnosis Date   • Hyperlipidemia    • Hypertension        Treatment Plan:    PNEUMONIA:  1. Start/Continue IV antibiotics- Back on antibiotics  2. Pulmonary toilet (nebulized treatments, Incentive Spirometry, P&D)  3. Early Mobilization  4. Collect sputum culture and blood  cultures x2  5. Supplement 02 as needed to maintain oxygen saturation >92%  6. DVT prophylaxis with Lovenox and/or SCD/Teds  7. Tobacco cessation education provided  8.   SLEEP STUDY    Discharge planning:   Home    Reviewed treatment plans with the patient and/or family.   20 minutes spent in face to face interaction and coordination of care.     Electronically signed by Donovan Jean Baptiste MD on 4/25/2017 at 4:41 PM

## 2017-04-25 NOTE — PAYOR COMM NOTE
"[id number]  Requesting cont stay.   Pt remains in hospital.   Please review .   [facility]   Ur department  [place] phone     Conner Jose (36 y.o. Male)     Date of Birth Social Security Number Address Home Phone MRN    1980  [id number]  [place] [address] [phone] [id number]    Denominational Marital Status          None        Admission Date Admission Type Admitting Provider Attending Provider Department, Room/Bed    4/22/17 Emergency Donovan Jean Baptiste MD [place], James Kyle, MD [facility] 4C, 472/1    Discharge Date Discharge Disposition Discharge Destination                      Attending Provider: Donovan Jean Baptiste MD     Allergies:  No Known Allergies    Isolation:  None   Infection:  None   Code Status:  FULL    Ht:  72\" (182.9 cm)   Wt:  333 lb 12.8 oz (151 kg)    Admission Cmt:  None   Principal Problem:  None                Active Insurance as of 4/22/2017     Primary Coverage     Payor Plan Insurance Group Employer/Plan Group    [employer] BLUE CROSS [facility] [id number] Proof [facility] PPO [id number]     Payor Plan Address Payor Plan Phone Number Effective From Effective To    [address] [phone] 8/1/2011     [place] [address]       Subscriber Name Subscriber Birth Date Member ID       CONNER JOSE 1980 [id number]                 Emergency Contacts      (Rel.) Home Phone Work Phone Mobile Phone    Leigh Jose (Spouse) [phone] -- --            Hospital Medications (active)       Dose Frequency Start End    acetaminophen (TYLENOL) tablet 650 mg 650 mg Every 6 Hours PRN 4/22/2017     Sig - Route: Take 2 tablets by mouth Every 6 (Six) Hours As Needed for Mild Pain (1-3). - Oral    amLODIPine (NORVASC) tablet 5 mg 5 mg Every 24 Hours Scheduled 4/22/2017     Sig - Route: Take 1 tablet by mouth Daily. - Oral    benazepril (LOTENSIN) tablet 40 mg 40 mg Every 24 Hours Scheduled 4/22/2017     Sig - Route: Take 2 " "tablets by mouth Daily. - Oral    bumetanide (BUMEX) injection 1 mg 1 mg Once 4/24/2017 4/24/2017    Sig - Route: Infuse 4 mL into a venous catheter 1 (One) Time. - Intravenous    enoxaparin (LOVENOX) syringe 40 mg 40 mg Every 12 Hours 4/24/2017     Sig - Route: Inject 0.4 mL under the skin Every 12 (Twelve) Hours. - Subcutaneous    ipratropium-albuterol (DUO-NEB) nebulizer solution 3 mL 3 mL 3 Times Daily - RT 4/24/2017     Sig - Route: Take 3 mL by nebulization 3 (Three) Times a Day. - Nebulization    levoFLOXacin (LEVAQUIN) 500 mg/100 mL D5W (premix) (LEVAQUIN) 500 mg 500 mg Every 24 Hours 4/24/2017     Sig - Route: Infuse 100 mL into a venous catheter Daily. - Intravenous    ondansetron (ZOFRAN) injection 4 mg 4 mg Every 6 Hours PRN 4/22/2017     Sig - Route: Infuse 2 mL into a venous catheter Every 6 (Six) Hours As Needed for Nausea or Vomiting. - Intravenous    pantoprazole (PROTONIX) EC tablet 40 mg 40 mg Every Early Morning 4/23/2017     Sig - Route: Take 1 tablet by mouth Every Morning. - Oral    Notes to Pharmacy: Give dose now, then tomorrow can start in am    perflutren (DEFINITY) 8.476 mg in sodium chloride 10 mL injection 10 mL Once 4/25/2017 4/25/2017    Sig - Route: Infuse 10 mL into a venous catheter 1 (One) Time. - Intravenous    piperacillin-tazobactam (ZOSYN) 3.375 g in iso-osmotic dextrose 50 ml (premix) 3.375 g Every 6 Hours 4/24/2017     Sig - Route: Infuse 50 mL into a venous catheter Every 6 (Six) Hours. - Intravenous    sodium chloride 0.9 % flush 10 mL 10 mL As Needed 4/22/2017     Sig - Route: Infuse 10 mL into a venous catheter As Needed for Line Care. - Intravenous    Linked Group 1:  \"And\" Linked Group Details        enoxaparin (LOVENOX) syringe 40 mg (Discontinued) 40 mg Every 24 Hours 4/22/2017 4/24/2017    Sig - Route: Inject 0.4 mL under the skin Daily. - Subcutaneous    ipratropium-albuterol (DUO-NEB) nebulizer solution 3 mL (Discontinued) 3 mL 4 Times Daily - RT 4/22/2017 " 4/24/2017    Sig - Route: Take 3 mL by nebulization 4 (Four) Times a Day. - Nebulization             Physician Progress Notes (last 24 hours) (Notes from 4/24/2017 12:43 PM through 4/25/2017 12:43 PM)      Reyes Beaver MD at 4/24/2017  1:33 PM  Version 2 of 2           PULMONARY AND CRITICAL CARE PROGRESS NOTE - [facility]    Patient: Conner Almonte  1980   MR# [id number]   Acct# [id number]  04/24/17   1:33 PM  Referring Provider: Donovan Jean Baptiste MD    Patient Active Problem List   Diagnosis   • Pneumonia of right lung due to infectious organism      Chief Complaint: Pneumonia    Interval history: Patient reports that his breathing seems to be improving and his cough is some better today. He does report that he did not sleep hardly any last night because they were stripping the floors all night and it was too loud and he also reports that he has had a headache. No other aggravating, alleviating factors or associated symptoms.    HPI  Meds:    amLODIPine 5 mg Oral Q24H   benazepril 40 mg Oral Q24H   enoxaparin 40 mg Subcutaneous Q12H   ipratropium-albuterol 3 mL Nebulization TID   pantoprazole 40 mg Oral Q AM        Review of Systems:   Review of Systems:    Constitutional: Positive for activity change, appetite change and fatigue. Negative for chills, diaphoresis and unexpected weight change.   HENT: Positive for congestion. Negative for ear pain, nosebleeds, sneezing and trouble swallowing.   Eyes: Negative for pain, discharge and itching.   Respiratory: Positive for cough, choking and chest tightness. Negative for apnea, shortness of breath and wheezing.   Cardiovascular: Positive for leg swelling. Negative for chest pain and palpitations.   Gastrointestinal: Negative for abdominal distention, constipation, nausea and vomiting.   Endocrine: Negative for cold intolerance, polydipsia, polyphagia and polyuria.   Genitourinary: Negative for difficulty urinating, hematuria, penile swelling  and testicular pain.   Musculoskeletal: Negative for arthralgias, joint swelling and myalgias.   Skin: Negative for color change, pallor and rash.   Allergic/Immunologic: Negative for environmental allergies, food allergies and immunocompromised state.   Neurological: Positive for weakness. Negative for dizziness and light-headedness.   Hematological: Negative for adenopathy. Does not bruise/bleed easily.   Psychiatric/Behavioral: Negative for confusion and hallucinations. The patient is not nervous/anxious.   All other systems reviewed and are negative.    Physical Exam:  SpO2 Readings from Last 3 Encounters:   04/24/17 95%     Temp:  [98.2 °F (36.8 °C)-98.5 °F (36.9 °C)] 98.5 °F (36.9 °C)  Heart Rate:  [73-98] 98  Resp:  [18-20] 20  BP: (141-153)/(68-92) 153/76  Intake/Output Summary (Last 24 hours) at 04/24/17 1333  Last data filed at 04/24/17 1204   Gross per 24 hour   Intake              760 ml   Output                0 ml   Net              760 ml     Physical Exam:    Constitutional: He is oriented to person, place, and time. He appears well-developed and well-nourished. No distress. Nasal cannula in place.   HENT:   Head: Normocephalic and atraumatic.   Right Ear: External ear normal.   Left Ear: External ear normal.   Nose: Nose normal.   Mouth/Throat: Oropharynx is clear and moist. No oropharyngeal exudate.   Eyes: Conjunctivae and EOM are normal. Pupils are equal, round, and reactive to light. Right eye exhibits no discharge. Left eye exhibits no discharge.   Neck: Normal range of motion. Neck supple. No JVD present.   Cardiovascular: Normal rate and regular rhythm.   No murmur heard.  Pulmonary/Chest: He has decreased breath sounds in the right upper field, the right middle field, the right lower field, the left upper field, the left middle field and the left lower field.   Abdominal: Soft. Bowel sounds are normal.   Musculoskeletal: Normal range of motion. He exhibits edema.   2+ edema   Neurological:  He is oriented to person, place, and time. He has normal reflexes. No cranial nerve deficit. Coordination normal.   Skin: Skin is warm and dry. No rash noted.   Psychiatric: He has a normal mood and affect. His behavior is normal. Thought content normal.   Nursing note and vitals reviewed.         Results from last 7 days  Lab Units 04/23/17  0510 04/22/17  0904   WBC 10*3/mm3 11.74* 20.66*   HEMOGLOBIN g/dL 13.8* 16.0   PLATELETS 10*3/mm3 261 315       Results from last 7 days  Lab Units 04/23/17  0510 04/22/17  0904   SODIUM mmol/L 139 142   POTASSIUM mmol/L 4.5 4.4   BUN mg/dL 15 14   CREATININE mg/dL 0.81 0.75         Blood Culture   Date Value Ref Range Status   04/22/2017 No growth at 2 days  Preliminary   04/22/2017 No growth at 2 days  Preliminary     Recent films:  Imaging Results (last 24 hours)     Procedure Component Value Units Date/Time    XR Chest 1 View [[id number]] Collected:  04/24/17 0801     Updated:  04/24/17 0850    Narrative:       HISTORY: Infiltrate     CXR: Frontal view of the chest is obtained and compared to the  04/22/2017 study.     There is a diminished level of inspiration. Right perihilar opacities  persist. The heart appears borderline enlarged. There is no pleural  effusion or pneumothorax. No acute appearing bony pathology identified.       Impression:       1. Persistent right perihilar opacities. Pneumonia versus asymmetrical  edema considered. Diminished level of inspiration. Follow up would be  helpful.  This report was finalized on 04/24/2017 08:47 by Dr. Ashwini Moser MD.        Films reviewed personally by me.  My interpretation: persistent right sided infiltrate although it does show slight improvement    Pulmonary Assessment:    1. Chronic Alcohol Abuse  2. Suspected Sleep Apnea  3. Morbid Obesity  4. Leukocytosis-improving  5. Reflux  6. Aspiration Pneumonia-better  7. Acute hypoxemic respiratory failure-better  8. Headache    Recommend:     · Decrease frequency of the  breathing treatment as the beta agonist could be making his headaches worse  · Wean down FIO2 as tolerated  · Mobilize as much as able  · Agree with the need for an outpatient sleep study once he is stable  · Still not ready for discharge as he is requiring too much FIO2 to be discharged home    Electronically signed by RANDY Davison, 04/24/17, 1:33 PM     Physician substantive contribution:  Pertinent symptoms/interval history include: still has a lot of dyspnea on exertion  Respiratory exam shows pertinent findings of:diminished breath sounds, wheezing, crackles.  Plan includes: continue iv antibiotics, oxygen, ambulation.  Not able to go home.  I have seen and examined patient personally, performing a face-to-face diagnostic evaluation with plan of care reviewed and developed with APRN and nursing staff. I have addended and/or modified the above history of present illness, physical examination, and assessment and plan to reflect my findings and impressions. Essential elements of the care plan were discussed with APRN above.  Agree with findings and assessment/plan as documented above.    Electronically signed by Reyes Beaver MD, on 4/24/2017, 4:54 PM             Electronically signed by Reyes Beaver MD at 4/24/2017  4:57 PM      RANDY Corcoran at 4/24/2017  1:33 PM  Version 1 of 2           PULMONARY AND CRITICAL CARE PROGRESS NOTE - [facility]    Patient: Conner Almonte  1980   MR# [id number]   Acct# [id number]  04/24/17   1:33 PM  Referring Provider: Donovan Jean Baptiste MD    Patient Active Problem List   Diagnosis   • Pneumonia of right lung due to infectious organism      Chief Complaint: Pneumonia    Interval history: Patient reports that his breathing seems to be improving and his cough is some better today. He does report that he did not sleep hardly any last night because they were stripping the floors all night and it was too loud and he also reports that  he has had a headache. No other aggravating, alleviating factors or associated symptoms.    HPI  Meds:    amLODIPine 5 mg Oral Q24H   benazepril 40 mg Oral Q24H   enoxaparin 40 mg Subcutaneous Q12H   ipratropium-albuterol 3 mL Nebulization TID   pantoprazole 40 mg Oral Q AM        Review of Systems:   Review of Systems:    Constitutional: Positive for activity change, appetite change and fatigue. Negative for chills, diaphoresis and unexpected weight change.   HENT: Positive for congestion. Negative for ear pain, nosebleeds, sneezing and trouble swallowing.   Eyes: Negative for pain, discharge and itching.   Respiratory: Positive for cough, choking and chest tightness. Negative for apnea, shortness of breath and wheezing.   Cardiovascular: Positive for leg swelling. Negative for chest pain and palpitations.   Gastrointestinal: Negative for abdominal distention, constipation, nausea and vomiting.   Endocrine: Negative for cold intolerance, polydipsia, polyphagia and polyuria.   Genitourinary: Negative for difficulty urinating, hematuria, penile swelling and testicular pain.   Musculoskeletal: Negative for arthralgias, joint swelling and myalgias.   Skin: Negative for color change, pallor and rash.   Allergic/Immunologic: Negative for environmental allergies, food allergies and immunocompromised state.   Neurological: Positive for weakness. Negative for dizziness and light-headedness.   Hematological: Negative for adenopathy. Does not bruise/bleed easily.   Psychiatric/Behavioral: Negative for confusion and hallucinations. The patient is not nervous/anxious.   All other systems reviewed and are negative.    Physical Exam:  SpO2 Readings from Last 3 Encounters:   04/24/17 95%     Temp:  [98.2 °F (36.8 °C)-98.5 °F (36.9 °C)] 98.5 °F (36.9 °C)  Heart Rate:  [73-98] 98  Resp:  [18-20] 20  BP: (141-153)/(68-92) 153/76  Intake/Output Summary (Last 24 hours) at 04/24/17 1333  Last data filed at 04/24/17 1204   Gross per 24  hour   Intake              760 ml   Output                0 ml   Net              760 ml     Physical Exam:    Constitutional: He is oriented to person, place, and time. He appears well-developed and well-nourished. No distress. Nasal cannula in place.   HENT:   Head: Normocephalic and atraumatic.   Right Ear: External ear normal.   Left Ear: External ear normal.   Nose: Nose normal.   Mouth/Throat: Oropharynx is clear and moist. No oropharyngeal exudate.   Eyes: Conjunctivae and EOM are normal. Pupils are equal, round, and reactive to light. Right eye exhibits no discharge. Left eye exhibits no discharge.   Neck: Normal range of motion. Neck supple. No JVD present.   Cardiovascular: Normal rate and regular rhythm.   No murmur heard.  Pulmonary/Chest: He has decreased breath sounds in the right upper field, the right middle field, the right lower field, the left upper field, the left middle field and the left lower field.   Abdominal: Soft. Bowel sounds are normal.   Musculoskeletal: Normal range of motion. He exhibits edema.   2+ edema   Neurological: He is oriented to person, place, and time. He has normal reflexes. No cranial nerve deficit. Coordination normal.   Skin: Skin is warm and dry. No rash noted.   Psychiatric: He has a normal mood and affect. His behavior is normal. Thought content normal.   Nursing note and vitals reviewed.         Results from last 7 days  Lab Units 04/23/17  0510 04/22/17  0904   WBC 10*3/mm3 11.74* 20.66*   HEMOGLOBIN g/dL 13.8* 16.0   PLATELETS 10*3/mm3 261 315       Results from last 7 days  Lab Units 04/23/17  0510 04/22/17  0904   SODIUM mmol/L 139 142   POTASSIUM mmol/L 4.5 4.4   BUN mg/dL 15 14   CREATININE mg/dL 0.81 0.75         Blood Culture   Date Value Ref Range Status   04/22/2017 No growth at 2 days  Preliminary   04/22/2017 No growth at 2 days  Preliminary     Recent films:  Imaging Results (last 24 hours)     Procedure Component Value Units Date/Time    XR Chest 1  View [[id number]] Collected:  04/24/17 0801     Updated:  04/24/17 0850    Narrative:       HISTORY: Infiltrate     CXR: Frontal view of the chest is obtained and compared to the  04/22/2017 study.     There is a diminished level of inspiration. Right perihilar opacities  persist. The heart appears borderline enlarged. There is no pleural  effusion or pneumothorax. No acute appearing bony pathology identified.       Impression:       1. Persistent right perihilar opacities. Pneumonia versus asymmetrical  edema considered. Diminished level of inspiration. Follow up would be  helpful.  This report was finalized on 04/24/2017 08:47 by Dr. Ashwini Moser MD.        Films reviewed personally by me.  My interpretation: persistent right sided infiltrate although it does show slight improvement    Pulmonary Assessment:    1. Chronic Alcohol Abuse  2. Suspected Sleep Apnea  3. Morbid Obesity  4. Leukocytosis-improving  5. Reflux  6. Aspiration Pneumonia-better  7. Acute hypoxemic respiratory failure-better  8. Headache    Recommend:     · Decrease frequency of the breathing treatment as the beta agonist could be making his headaches worse  · Wean down FIO2 as tolerated  · Mobilize as much as able  · Agree with the need for an outpatient sleep study once he is stable  · Still not ready for discharge as he is requiring too much FIO2 to be discharged home    Electronically signed by RANDY Davison, 04/24/17, 1:33 PM        Electronically signed by RANDY Corcoran at 4/24/2017  1:39 PM      Donovan Jean Baptiste MD at 4/24/2017  8:39 PM  Version 1 of 1         [facility]  Daily Progress Note  Conner Almonte  MRN: [id number] LOS: 2    Admit Date: 4/22/2017 4/24/2017 8:40 PM    Subjective:          Chief Complaint:  Chief Complaint   Patient presents with   • Cough       Interval History:    Reviewed overnight events and nursing notes.   Status:  better than before  Pain:  complete  resolution        ROS:  10 point ROS obtained:   Gen: No fevers  HEENT: No migraine or visual change  Lung: No cough, hemopotysis or wheezing  Cv: No chest pain or pressure  Abd: No nausea or vomit, no change in bowel fct, no gi bleeding  Ext: No inc pain or swelling  Neuro: No seizure or syncope  Skin: No new rashes  Endo: No polyuria, polyphagia or poilydypsia  Psyc: No inc anxiety or depression  MS: No increase in joint pain or swelling reported    DIET:  Diet Regular    Medications:        amLODIPine 5 mg Oral Q24H   benazepril 40 mg Oral Q24H   enoxaparin 40 mg Subcutaneous Q12H   ipratropium-albuterol 3 mL Nebulization TID - RT   levoFLOXacin 500 mg Intravenous Q24H   pantoprazole 40 mg Oral Q AM   piperacillin-tazobactam 3.375 g Intravenous Q6H       Data:     Code Status: Full Code    History reviewed. No pertinent family history.  Social History     Social History   • Marital status:      Spouse name: N/A   • Number of children: N/A   • Years of education: N/A     Occupational History   • Not on file.     Social History Main Topics   • Smoking status: Former Smoker     Types: Cigarettes   • Smokeless tobacco: Not on file   • Alcohol use Yes      Comment: beer every weekend   • Drug use: No   • Sexual activity: Defer     Other Topics Concern   • Not on file     Social History Narrative   • No narrative on file     V Odilon Cooper RN Registered Nurse Signed  Plan of Care Date of Service: 4/25/2017  3:35 AM         Problem: Patient Care Overview (Adult)  Goal: Plan of Care Review  Outcome: Ongoing (interventions implemented as appropriate)     04/25/17 0330   Coping/Psychosocial Response Interventions   Plan Of Care Reviewed With patient;spouse   Patient Care Overview   Progress improving   Outcome Evaluation   Outcome Summary/Follow up Plan no complaints of pain or discomfort throughout the night. Pt. started on IV antibiotics, bumex given as ordered, O2 sats staying in mid 90s, pt on 2.5 L per NC  "while awake, and on 3.5 while asleep d/t O2 sats dropping to upper 80s. Activity encouraged, wife at bedside, will continue to monitor          Problem: Pneumonia (Adult)  Goal: Signs and Symptoms of Listed Potential Problems Will be Absent or Manageable (Pneumonia)  Outcome: Ongoing (interventions implemented as appropriate)     Problem: Activity Intolerance (Adult)  Goal: Identify Related Risk Factors and Signs and Symptoms  Outcome: Ongoing (interventions implemented as appropriate)  Goal: Activity Tolerance  Outcome: Ongoing (interventions implemented as appropriate)  Goal: Effective Energy Conservation Techniques  Outcome: Ongoing (interventions implemented as appropriate)                                Objective:     Vitals: /72 (BP Location: Left arm, Patient Position: Lying)  Pulse 100  Temp 99 °F (37.2 °C) (Temporal Artery )   Resp 18  Ht 72\" (182.9 cm)  Wt (!) 333 lb 12.8 oz (151 kg)  SpO2 93%  BMI 45.27 kg/m2   Intake/Output Summary (Last 24 hours) at 17  Last data filed at 17   Gross per 24 hour   Intake              610 ml   Output                0 ml   Net              610 ml    Temp (24hrs), Av.5 °F (36.9 °C), Min:98.2 °F (36.8 °C), Max:99 °F (37.2 °C)    Glucose:  @LABRCNT(pocglu:4)@  Physical Examination:   General appearance - alert, well appearing, and in no distress and oriented to person, place, and time  Mental status - alert, oriented to person, place, and time, normal mood, behavior, speech, dress, motor activity, and thought processes  Eyes - pupils equal and reactive, extraocular eye movements intact  Ears - bilateral TM's and external ear canals normal, hearing grossly normal bilaterally  Mouth - mucous membranes moist, pharynx normal without lesions  Neck - supple, no significant adenopathy  Lymphatics - no palpable lymphadenopathy, no hepatosplenomegaly  Chest - DIMINISHED RALES  Heart - normal rate, regular rhythm, normal S1, S2, no murmurs, rubs, " clicks or gallops  Abdomen - soft, nontender, nondistended, no masses or organomegaly bowel sounds normal  Neurological - alert, oriented, normal speech, no focal findings or movement disorder noted  Musculoskeletal - no joint tenderness, deformity or swelling  Extremities - peripheral pulses normal, no pedal edema, no clubbing or cyanosis  Skin - normal coloration and turgor, no rashes, no suspicious skin lesions noted      Assessment and Plan:     Primary Problem:  ASPIRATION PNEUMONIA  ACUTE RESPIRATORY FAILURE  Hospital Problem list:  Active Problems:    Pneumonia of right lung due to infectious organism      PMH:  Past Medical History:   Diagnosis Date   • Hyperlipidemia    • Hypertension        Treatment Plan:    PNEUMONIA:  1. Start/Continue IV antibiotics- HAS NOT GOTTEN ABX SINCE ER!!!!  2. Pulmonary toilet (nebulized treatments, Incentive Spirometry, P&D)  3. Early Mobilization  4. Collect sputum culture and blood cultures x2  5. Supplement 02 as needed to maintain oxygen saturation >92%  6. DVT prophylaxis with Lovenox and/or SCD/Teds  7. Tobacco cessation education provided  8.   OUTPT SLEEP STUDY    Discharge planning:   Home    Reviewed treatment plans with the patient and/or family.   20 minutes spent in face to face interaction and coordination of care.     Electronically signed by Donovan Jean Baptiste MD on 4/24/2017 at 8:40 PM     Electronically signed by Donovan Jean Baptiste MD at 4/24/2017  8:42 PM      Reyes Beaver MD at 4/25/2017  8:24 AM  Version 1 of 1           PULMONARY AND CRITICAL CARE PROGRESS NOTE - [facility]    Patient: Conner Almonte  1980   MR# [id number]   Acct# [id number]  04/25/17   8:24 AM  Referring Provider: Donovan Jean Baptiste MD    Patient Active Problem List   Diagnosis   • Pneumonia of right lung due to infectious organism      Chief Complaint: Hypoxia    Interval history: Patient feels much better with decreased dyspnea.  Antibiotic snafu was  "identified this AM.  I was under impression that antibiotics were started in ER and continued.  EPIC data and display salad hid this bit of information from me after meds were first reviewed.  Despite this he has continued to improve.  Less dyspnea intermittently in chest improving, aggravated by exertion, alleviated by oxygen, associated with cough for 4 days. Wife says desaturation alarm was ringing \"all night\" when he was asleep  Cough   Pertinent negatives include no chest pain, chills or fever.     Meds:    amLODIPine 5 mg Oral Q24H   benazepril 40 mg Oral Q24H   enoxaparin 40 mg Subcutaneous Q12H   ipratropium-albuterol 3 mL Nebulization TID - RT   levoFLOXacin 500 mg Intravenous Q24H   pantoprazole 40 mg Oral Q AM   piperacillin-tazobactam 3.375 g Intravenous Q6H        Review of Systems:   Review of Systems   Constitutional: Negative for chills and fever.   HENT: Negative for dental problem.    Respiratory: Positive for cough. Negative for choking and stridor.    Cardiovascular: Negative for chest pain.   Gastrointestinal: Negative for abdominal pain, diarrhea and vomiting.   Musculoskeletal: Negative for arthralgias.   Hematological: Does not bruise/bleed easily.     Physical Exam:  SpO2 Readings from Last 3 Encounters:   04/25/17 92%     Temp:  [97.4 °F (36.3 °C)-99 °F (37.2 °C)] 97.4 °F (36.3 °C)  Heart Rate:  [] 93  Resp:  [18-22] 20  BP: (123-153)/(60-78) 134/78    Intake/Output Summary (Last 24 hours) at 04/25/17 0824  Last data filed at 04/25/17 0820   Gross per 24 hour   Intake              810 ml   Output              500 ml   Net              310 ml     Physical Exam   Constitutional: He is oriented to person, place, and time. He appears well-developed.   HENT:   Head: Normocephalic and atraumatic.   Neck: Normal range of motion. Neck supple. No JVD present.   thick   Cardiovascular: Normal rate and regular rhythm.    Pulmonary/Chest: Effort normal and breath sounds normal. No respiratory " distress. He has no wheezes. He has no rales.   Abdominal: Soft. Bowel sounds are normal. There is no tenderness. There is no rebound and no guarding.   obese   Musculoskeletal: He exhibits no edema, tenderness or deformity.   Neurological: He is alert and oriented to person, place, and time.   Skin: Skin is warm and dry. He is not diaphoretic. There is erythema.   Psychiatric: He has a normal mood and affect.   :    Results from last 7 days  Lab Units 04/23/17  0510 04/22/17  0904   WBC 10*3/mm3 11.74* 20.66*   HEMOGLOBIN g/dL 13.8* 16.0   PLATELETS 10*3/mm3 261 315       Results from last 7 days  Lab Units 04/23/17  0510 04/22/17  0904   SODIUM mmol/L 139 142   POTASSIUM mmol/L 4.5 4.4   BUN mg/dL 15 14   CREATININE mg/dL 0.81 0.75         Blood Culture   Date Value Ref Range Status   04/22/2017 No growth at 2 days  Preliminary   04/22/2017 No growth at 2 days  Preliminary     Pulmonary Assessment:  1. Chronic Alcohol Abuse  2. Suspected Sleep Apnea  3. Morbid Obesity  4. Leukocytosis-improving  5. Reflux  6. Aspiration Pneumonitis  7. Acute hypoxemic respiratory failure-better    Recommend:   · Overnight oximetry tonight  · Continue to wean down FIO2 as tolerated  · Ambulate  · Outpatient sleep study  · Repeat CXR, pa and lateral in am  · Echo pending  · Back on antibiotics    Electronically signed by Reyes Beaver MD, 04/25/17, 8:24 AM          Electronically signed by Reyes Beaver MD at 4/25/2017  8:44 AM        Consult Notes (last 24 hours) (Notes from 4/24/2017 12:43 PM through 4/25/2017 12:43 PM)     No notes of this type exist for this encounter.

## 2017-04-25 NOTE — PROGRESS NOTES
"[facility]  Daily Progress Note  Conner Almonte  MRN: [id number] LOS: 2    Admit Date: 4/22/2017 4/24/2017 8:40 PM    Subjective:          Chief Complaint:  Chief Complaint   Patient presents with   • Cough       Interval History:    Reviewed overnight events and nursing notes.   Status:  better than before  Pain:  complete resolution        ROS:  10 point ROS obtained:   Gen: No fevers  HEENT: No migraine or visual change  Lung: No cough, hemopotysis or wheezing  Cv: No chest pain or pressure  Abd: No nausea or vomit, no change in bowel fct, no gi bleeding  Ext: No inc pain or swelling  Neuro: No seizure or syncope  Skin: No new rashes  Endo: No polyuria, polyphagia or poilydypsia  Psyc: No inc anxiety or depression  MS: No increase in joint pain or swelling reported    DIET:  Diet Regular    Medications:        amLODIPine 5 mg Oral Q24H   benazepril 40 mg Oral Q24H   enoxaparin 40 mg Subcutaneous Q12H   ipratropium-albuterol 3 mL Nebulization TID - RT   levoFLOXacin 500 mg Intravenous Q24H   pantoprazole 40 mg Oral Q AM   piperacillin-tazobactam 3.375 g Intravenous Q6H       Data:     Code Status: Full Code    History reviewed. No pertinent family history.  Social History     Social History   • Marital status:      Spouse name: N/A   • Number of children: N/A   • Years of education: N/A     Occupational History   • Not on file.     Social History Main Topics   • Smoking status: Former Smoker     Types: Cigarettes   • Smokeless tobacco: Not on file   • Alcohol use Yes      Comment: beer every weekend   • Drug use: No   • Sexual activity: Defer     Other Topics Concern   • Not on file     Social History Narrative   • No narrative on file         Objective:     Vitals: /72 (BP Location: Left arm, Patient Position: Lying)  Pulse 100  Temp 99 °F (37.2 °C) (Temporal Artery )   Resp 18  Ht 72\" (182.9 cm)  Wt (!) 333 lb 12.8 oz (151 kg)  SpO2 93%  BMI 45.27 kg/m2   Intake/Output Summary " (Last 24 hours) at 17  Last data filed at 17   Gross per 24 hour   Intake              610 ml   Output                0 ml   Net              610 ml    Temp (24hrs), Av.5 °F (36.9 °C), Min:98.2 °F (36.8 °C), Max:99 °F (37.2 °C)    Glucose:  @LABRCNT(pocglu:4)@  Physical Examination:   General appearance - alert, well appearing, and in no distress and oriented to person, place, and time  Mental status - alert, oriented to person, place, and time, normal mood, behavior, speech, dress, motor activity, and thought processes  Eyes - pupils equal and reactive, extraocular eye movements intact  Ears - bilateral TM's and external ear canals normal, hearing grossly normal bilaterally  Mouth - mucous membranes moist, pharynx normal without lesions  Neck - supple, no significant adenopathy  Lymphatics - no palpable lymphadenopathy, no hepatosplenomegaly  Chest - DIMINISHED RALES  Heart - normal rate, regular rhythm, normal S1, S2, no murmurs, rubs, clicks or gallops  Abdomen - soft, nontender, nondistended, no masses or organomegaly bowel sounds normal  Neurological - alert, oriented, normal speech, no focal findings or movement disorder noted  Musculoskeletal - no joint tenderness, deformity or swelling  Extremities - peripheral pulses normal, no pedal edema, no clubbing or cyanosis  Skin - normal coloration and turgor, no rashes, no suspicious skin lesions noted      Assessment and Plan:     Primary Problem:  ASPIRATION PNEUMONIA  ACUTE RESPIRATORY FAILURE  Hospital Problem list:  Active Problems:    Pneumonia of right lung due to infectious organism      PMH:  Past Medical History:   Diagnosis Date   • Hyperlipidemia    • Hypertension        Treatment Plan:    PNEUMONIA:  1. Start/Continue IV antibiotics- HAS NOT GOTTEN ABX SINCE ER!!!!  2. Pulmonary toilet (nebulized treatments, Incentive Spirometry, P&D)  3. Early Mobilization  4. Collect sputum culture and blood cultures x2  5. Supplement 02 as  needed to maintain oxygen saturation >92%  6. DVT prophylaxis with Lovenox and/or SCD/Teds  7. Tobacco cessation education provided  8.   OUTPT SLEEP STUDY    Discharge planning:   Home    Reviewed treatment plans with the patient and/or family.   20 minutes spent in face to face interaction and coordination of care.     Electronically signed by Donovan Jean Baptiste MD on 4/24/2017 at 8:40 PM

## 2017-04-25 NOTE — PROGRESS NOTES
"  PULMONARY AND CRITICAL CARE PROGRESS NOTE - [facility]    Patient: Conner Almonte  1980   MR# [id number]   Acct# [id number]  04/25/17   8:24 AM  Referring Provider: Donovan Jean Baptiste MD    Patient Active Problem List   Diagnosis   • Pneumonia of right lung due to infectious organism      Chief Complaint: Hypoxia    Interval history: Patient feels much better with decreased dyspnea.  Antibiotic snafu was identified this AM.  I was under impression that antibiotics were started in ER and continued.  EPIC data and display salad hid this bit of information from me after meds were first reviewed.  Despite this he has continued to improve.  Less dyspnea intermittently in chest improving, aggravated by exertion, alleviated by oxygen, associated with cough for 4 days. Wife says desaturation alarm was ringing \"all night\" when he was asleep  Cough   Pertinent negatives include no chest pain, chills or fever.     Meds:    amLODIPine 5 mg Oral Q24H   benazepril 40 mg Oral Q24H   enoxaparin 40 mg Subcutaneous Q12H   ipratropium-albuterol 3 mL Nebulization TID - RT   levoFLOXacin 500 mg Intravenous Q24H   pantoprazole 40 mg Oral Q AM   piperacillin-tazobactam 3.375 g Intravenous Q6H        Review of Systems:   Review of Systems   Constitutional: Negative for chills and fever.   HENT: Negative for dental problem.    Respiratory: Positive for cough. Negative for choking and stridor.    Cardiovascular: Negative for chest pain.   Gastrointestinal: Negative for abdominal pain, diarrhea and vomiting.   Musculoskeletal: Negative for arthralgias.   Hematological: Does not bruise/bleed easily.     Physical Exam:  SpO2 Readings from Last 3 Encounters:   04/25/17 92%     Temp:  [97.4 °F (36.3 °C)-99 °F (37.2 °C)] 97.4 °F (36.3 °C)  Heart Rate:  [] 93  Resp:  [18-22] 20  BP: (123-153)/(60-78) 134/78    Intake/Output Summary (Last 24 hours) at 04/25/17 0824  Last data filed at 04/25/17 0820   Gross per 24 hour "   Intake              810 ml   Output              500 ml   Net              310 ml     Physical Exam   Constitutional: He is oriented to person, place, and time. He appears well-developed.   HENT:   Head: Normocephalic and atraumatic.   Neck: Normal range of motion. Neck supple. No JVD present.   thick   Cardiovascular: Normal rate and regular rhythm.    Pulmonary/Chest: Effort normal and breath sounds normal. No respiratory distress. He has no wheezes. He has no rales.   Abdominal: Soft. Bowel sounds are normal. There is no tenderness. There is no rebound and no guarding.   obese   Musculoskeletal: He exhibits no edema, tenderness or deformity.   Neurological: He is alert and oriented to person, place, and time.   Skin: Skin is warm and dry. He is not diaphoretic. There is erythema.   Psychiatric: He has a normal mood and affect.   :    Results from last 7 days  Lab Units 04/23/17  0510 04/22/17  0904   WBC 10*3/mm3 11.74* 20.66*   HEMOGLOBIN g/dL 13.8* 16.0   PLATELETS 10*3/mm3 261 315       Results from last 7 days  Lab Units 04/23/17  0510 04/22/17  0904   SODIUM mmol/L 139 142   POTASSIUM mmol/L 4.5 4.4   BUN mg/dL 15 14   CREATININE mg/dL 0.81 0.75         Blood Culture   Date Value Ref Range Status   04/22/2017 No growth at 2 days  Preliminary   04/22/2017 No growth at 2 days  Preliminary     Pulmonary Assessment:  1. Chronic Alcohol Abuse  2. Suspected Sleep Apnea  3. Morbid Obesity  4. Leukocytosis-improving  5. Reflux  6. Aspiration Pneumonitis  7. Acute hypoxemic respiratory failure-better    Recommend:   · Overnight oximetry tonight  · Continue to wean down FIO2 as tolerated  · Ambulate  · Outpatient sleep study  · Repeat CXR, pa and lateral in am  · Echo pending  · Back on antibiotics    Electronically signed by Reyes Beaver MD, 04/25/17, 8:24 AM

## 2017-04-25 NOTE — PLAN OF CARE
Problem: Patient Care Overview (Adult)  Goal: Plan of Care Review  Outcome: Ongoing (interventions implemented as appropriate)    04/25/17 0330   Coping/Psychosocial Response Interventions   Plan Of Care Reviewed With patient;spouse   Patient Care Overview   Progress improving   Outcome Evaluation   Outcome Summary/Follow up Plan no complaints of pain or discomfort throughout the night. Pt. started on IV antibiotics, bumex given as ordered, O2 sats staying in mid 90s, pt on 2.5 L per NC while awake, and on 3.5 while asleep d/t O2 sats dropping to upper 80s. Activity encouraged, wife at bedside, will continue to monitor         Problem: Pneumonia (Adult)  Goal: Signs and Symptoms of Listed Potential Problems Will be Absent or Manageable (Pneumonia)  Outcome: Ongoing (interventions implemented as appropriate)    Problem: Activity Intolerance (Adult)  Goal: Identify Related Risk Factors and Signs and Symptoms  Outcome: Ongoing (interventions implemented as appropriate)  Goal: Activity Tolerance  Outcome: Ongoing (interventions implemented as appropriate)  Goal: Effective Energy Conservation Techniques  Outcome: Ongoing (interventions implemented as appropriate)

## 2017-04-25 NOTE — PLAN OF CARE
Problem: Patient Care Overview (Adult)  Goal: Plan of Care Review  Outcome: Ongoing (interventions implemented as appropriate)    04/25/17 2056   Coping/Psychosocial Response Interventions   Plan Of Care Reviewed With patient   Patient Care Overview   Progress no change   Outcome Evaluation   Outcome Summary/Follow up Plan soa noted with exertion, iv abx cont, over night sleep oximetry tonight and chest exray done today         Problem: Pneumonia (Adult)  Goal: Signs and Symptoms of Listed Potential Problems Will be Absent or Manageable (Pneumonia)  Outcome: Ongoing (interventions implemented as appropriate)    Problem: Activity Intolerance (Adult)  Goal: Identify Related Risk Factors and Signs and Symptoms  Outcome: Outcome(s) achieved Date Met:  04/25/17  Goal: Activity Tolerance  Outcome: Ongoing (interventions implemented as appropriate)  Goal: Effective Energy Conservation Techniques  Outcome: Ongoing (interventions implemented as appropriate)

## 2017-04-26 ENCOUNTER — APPOINTMENT (OUTPATIENT)
Dept: GENERAL RADIOLOGY | Facility: HOSPITAL | Age: 37
End: 2017-04-26

## 2017-04-26 VITALS
RESPIRATION RATE: 20 BRPM | WEIGHT: 315 LBS | DIASTOLIC BLOOD PRESSURE: 87 MMHG | OXYGEN SATURATION: 98 % | HEIGHT: 72 IN | TEMPERATURE: 96.6 F | HEART RATE: 94 BPM | BODY MASS INDEX: 42.66 KG/M2 | SYSTOLIC BLOOD PRESSURE: 133 MMHG

## 2017-04-26 LAB
BACTERIA SPEC RESP CULT: NORMAL
GRAM STN SPEC: NORMAL

## 2017-04-26 PROCEDURE — 94799 UNLISTED PULMONARY SVC/PX: CPT

## 2017-04-26 PROCEDURE — 25010000002 PIPERACILLIN SOD-TAZOBACTAM PER 1 G: Performed by: FAMILY MEDICINE

## 2017-04-26 PROCEDURE — 71020 HC CHEST PA AND LATERAL: CPT

## 2017-04-26 RX ORDER — AMOXICILLIN AND CLAVULANATE POTASSIUM 875; 125 MG/1; MG/1
1 TABLET, FILM COATED ORAL EVERY 12 HOURS SCHEDULED
Status: DISCONTINUED | OUTPATIENT
Start: 2017-04-26 | End: 2017-04-26 | Stop reason: HOSPADM

## 2017-04-26 RX ORDER — IPRATROPIUM BROMIDE AND ALBUTEROL SULFATE 2.5; .5 MG/3ML; MG/3ML
3 SOLUTION RESPIRATORY (INHALATION)
Qty: 360 ML | Refills: 1 | Status: SHIPPED | OUTPATIENT
Start: 2017-04-26

## 2017-04-26 RX ORDER — AMOXICILLIN AND CLAVULANATE POTASSIUM 875; 125 MG/1; MG/1
1 TABLET, FILM COATED ORAL EVERY 12 HOURS SCHEDULED
Qty: 14 TABLET | Refills: 0 | Status: SHIPPED | OUTPATIENT
Start: 2017-04-26 | End: 2017-04-30

## 2017-04-26 RX ORDER — LEVOFLOXACIN 500 MG/1
500 TABLET, FILM COATED ORAL EVERY 24 HOURS
Qty: 7 TABLET | Refills: 0 | Status: SHIPPED | OUTPATIENT
Start: 2017-04-26 | End: 2017-05-03

## 2017-04-26 RX ORDER — LEVOFLOXACIN 500 MG/1
500 TABLET, FILM COATED ORAL EVERY 24 HOURS
Status: DISCONTINUED | OUTPATIENT
Start: 2017-04-26 | End: 2017-04-26 | Stop reason: HOSPADM

## 2017-04-26 RX ADMIN — TAZOBACTAM SODIUM AND PIPERACILLIN SODIUM 3.38 G: 375; 3 INJECTION, SOLUTION INTRAVENOUS at 08:53

## 2017-04-26 RX ADMIN — IPRATROPIUM BROMIDE AND ALBUTEROL SULFATE 3 ML: .5; 3 SOLUTION RESPIRATORY (INHALATION) at 14:45

## 2017-04-26 RX ADMIN — AMOXICILLIN AND CLAVULANATE POTASSIUM 1 TABLET: 875; 125 TABLET, FILM COATED ORAL at 14:35

## 2017-04-26 RX ADMIN — BENAZEPRIL HYDROCHLORIDE 40 MG: 20 TABLET, COATED ORAL at 08:52

## 2017-04-26 RX ADMIN — TAZOBACTAM SODIUM AND PIPERACILLIN SODIUM 3.38 G: 375; 3 INJECTION, SOLUTION INTRAVENOUS at 01:55

## 2017-04-26 RX ADMIN — IPRATROPIUM BROMIDE AND ALBUTEROL SULFATE 3 ML: .5; 3 SOLUTION RESPIRATORY (INHALATION) at 06:35

## 2017-04-26 RX ADMIN — LEVOFLOXACIN 500 MG: 500 TABLET, FILM COATED ORAL at 14:34

## 2017-04-26 RX ADMIN — AMLODIPINE BESYLATE 5 MG: 5 TABLET ORAL at 08:52

## 2017-04-26 RX ADMIN — PANTOPRAZOLE SODIUM 40 MG: 40 TABLET, DELAYED RELEASE ORAL at 05:08

## 2017-04-26 NOTE — PLAN OF CARE
Problem: Patient Care Overview (Adult)  Goal: Plan of Care Review  Outcome: Ongoing (interventions implemented as appropriate)    04/26/17 0500   Coping/Psychosocial Response Interventions   Plan Of Care Reviewed With patient   Patient Care Overview   Progress no change   Outcome Evaluation   Outcome Summary/Follow up Plan no complaints of pain or discomfort throughout the night, SOA noted with activity, overnight sleep oximetry performed, Chest X-ray performed this am, awaiting results.          Problem: Pneumonia (Adult)  Goal: Signs and Symptoms of Listed Potential Problems Will be Absent or Manageable (Pneumonia)  Outcome: Ongoing (interventions implemented as appropriate)    Problem: Activity Intolerance (Adult)  Goal: Activity Tolerance  Outcome: Ongoing (interventions implemented as appropriate)  Goal: Effective Energy Conservation Techniques  Outcome: Ongoing (interventions implemented as appropriate)

## 2017-04-26 NOTE — PROGRESS NOTES
Exercise Oximetry    Patient Name:Conner Almonte   MRN: [id number]   Date: 04/26/17             ROOM AIR BASELINE   SpO2% 93   Heart Rate    Blood Pressure      EXERCISE ON ROOM AIR SpO2% EXERCISE ON O2 @   LPM SpO2%   1 MINUTE  1 MINUTE    2 MINUTES  2 MINUTES    3 MINUTES  3 MINUTES    4 MINUTES  4 MINUTES    5 MINUTES  5 MINUTES    6 MINUTES                         98  6 MINUTES               Distance Walked   Distance Walked   Dyspnea (Mookie Scale)   Dyspnea (Mookie Scale)   Fatigue (Mookie Scale)   Fatigue (Mookie Scale)   SpO2% Post Exercise   SpO2% Post Exercise   HR Post Exercise   HR Post Exercise   Time to Recovery   Time to Recovery     Comments:

## 2017-04-26 NOTE — PROGRESS NOTES
Continued Stay Note   [facility]     Patient Name: Conner Almonte  MRN: [id number]  Today's Date: 4/26/2017    Admit Date: 4/22/2017          Discharge Plan       04/26/17 1205    Case Management/Social Work Plan    Plan Home    Patient/Family In Agreement With Plan yes    Additional Comments Pt is going home today. He does not qualify for home oxygen, per Elaine with respiratory but he does qualify for NOC O2. No orders yet to arrange so informed charge nurse.               Discharge Codes     None        Expected Discharge Date and Time     Expected Discharge Date Expected Discharge Time    Apr 26, 2017             ARIEL Roque

## 2017-04-26 NOTE — DISCHARGE SUMMARY
Hospital Discharge Summary    Conner Almonte  :  1980  MRN:  [id number]    Admit date:  2017  Discharge date:      Admitting Physician:    Donovan Jean Baptiste MD    Discharge Diagnoses:    Active Problems:    Pneumonia of right lung due to infectious organism      Hospital Course:   The patient was admitted for the above noted medical/surgical issues. Please see daily progress note for further details concerning their stay. The patient improved throughout their stay and reached maximum medical improvement on the day of discharge. The patient/family agree with the treatment plan as outlined above. All questions concerning their stay were answered prior to discharge. They understand the importance of follow up concerning any abnormal test results.   Doing better this AM, took a shower W/O O2.   O2 walks W/O difficulty or desat.  Home O2 has been ordered by .    Discharge Medications:       Conner Almonte   Home Medication Instructions SANJU:[id number]    Printed on:17 1112   Medication Information                      amLODIPine-benazepril (LOTREL) 5-40 MG per capsule  Take 1 capsule by mouth Daily.             amoxicillin-clavulanate (AUGMENTIN) 875-125 MG per tablet  Take 1 tablet by mouth Every 12 (Twelve) Hours for 7 doses. Indications: Pneumonia             ipratropium-albuterol (DUO-NEB) 0.5-2.5 mg/mL nebulizer  Take 3 mL by nebulization 3 (Three) Times a Day.             levoFLOXacin (LEVAQUIN) 500 MG tablet  Take 1 tablet by mouth Daily for 7 days. Indications: Pneumonia             omeprazole (priLOSEC) 20 MG capsule  Take 20 mg by mouth Daily.             pravastatin (PRAVACHOL) 40 MG tablet  Take 40 mg by mouth Daily.             Respiratory Culture   Order: [id number]   Status:  Final result   Visible to patient:  No (Not Released)   Specimen Information: Bronchus; Sputum        Culture   Heavy growth (4+) Normal Respiratory [place]      Stain   Greater than 25 WBCs per low  power field      Few (2+) Epithelial cells seen      Many (4+) Mixed gram positive ximena      Resulting Agency:  [facility]      Specimen Collected: 04/24/17  8:10 AM   Last Resulted: 04/26/17  7:11 AM                     Consults:       Significant Diagnostic Studies:    See summary of labs/x-rays/path report for further details      Treatments:   IV ABX,NEB,Pulm consult, Home O2,out pt sleep study    Disposition:   Home , with his Son  Follow up with Donovan Jean Baptiste MD in 5 days.    Signed:  KAMRYN Chávez  4/26/2017, 11:12 AM    Pt. was seen and independently examined by me.  I have reviewed the PA/ARNP's note and agree with above.      Electronically signed by Donovan Jean Baptiste MD on 6/14/2017 at 11:00 AM

## 2017-04-26 NOTE — PROGRESS NOTES
Continued Stay Note  [facility]     Patient Name: Conner Almonte  MRN: [id number]  Today's Date: 4/26/2017    Admit Date: 4/22/2017          Discharge Plan       04/26/17 1558    Case Management/Social Work Plan    Plan Home    Patient/Family In Agreement With Plan yes    Final Note    Final Note Pt now has orders for NOC O2. Pt lives in [facility] and gave him the choices for the oxygen companies there and in [place]. He will go with [facility] [phone]. They will deliver to pt this evening. Faxed the referral to them at [phone].              Discharge Codes       04/26/17 1600    Discharge Codes    Discharge Codes 01  Discharge to home        Expected Discharge Date and Time     Expected Discharge Date Expected Discharge Time    Apr 26, 2017             ARIEL Roque

## 2017-04-26 NOTE — PROGRESS NOTES
PULMONARY AND CRITICAL CARE PROGRESS NOTE - [facility]    Patient: Conner Almonte  1980   MR# [id number]   Acct# [id number]  04/26/17   8:17 AM  Referring Provider: Donovan Jean Baptiste MD    Patient Active Problem List   Diagnosis   • Pneumonia of right lung due to infectious organism      Chief Complaint: Hypoxia and probable sleep disorder    Interval history: Overnight oximetry showed severe, prolonged desaturations, longest of which was > 30 min, lowest shagufta <70 (for 18 minutes).  Pt feels better overall.  No increased cough.  Walked in room and in hallway.  Mild dyspnea in chest continuously, improving for 48 hours, aggravated by exertion.  Also has poorly refreshing sleep and snoring.  HPI  Meds:    amLODIPine 5 mg Oral Q24H   benazepril 40 mg Oral Q24H   enoxaparin 40 mg Subcutaneous Q12H   ipratropium-albuterol 3 mL Nebulization TID - RT   levoFLOXacin 500 mg Intravenous Q24H   pantoprazole 40 mg Oral Q AM   piperacillin-tazobactam 3.375 g Intravenous Q6H        Review of Systems:   Review of Systems   Respiratory: Negative for choking.    Gastrointestinal: Negative for diarrhea and vomiting.   Musculoskeletal: Negative for arthralgias.   Psychiatric/Behavioral: Negative for confusion.     Physical Exam:  SpO2 Readings from Last 3 Encounters:   04/26/17 98%     Temp:  [96.6 °F (35.9 °C)-98.5 °F (36.9 °C)] 96.6 °F (35.9 °C)  Heart Rate:  [] 94  Resp:  [18-22] 20  BP: (126-136)/(75-91) 133/87    Intake/Output Summary (Last 24 hours) at 04/26/17 0817  Last data filed at 04/26/17 0155   Gross per 24 hour   Intake             1250 ml   Output             1450 ml   Net             -200 ml     Physical Exam   Constitutional: He appears well-developed.   HENT:   Head: Normocephalic and atraumatic.   Neck: Normal range of motion. Neck supple. No JVD present.   thick   Cardiovascular: Normal rate and regular rhythm.    Pulmonary/Chest: Effort normal and breath sounds normal. No accessory  muscle usage. No tachypnea. No respiratory distress. He has no wheezes. He has no rhonchi. He has no rales.   Abdominal: Soft. Bowel sounds are normal. There is no tenderness. There is no rebound and no guarding.   obese   Musculoskeletal: He exhibits no edema, tenderness or deformity.   Neurological: He is alert. He exhibits normal muscle tone. Coordination normal.   Skin: Skin is warm and dry. He is not diaphoretic.   Psychiatric: He has a normal mood and affect.   :    Results from last 7 days  Lab Units 04/23/17  0510 04/22/17  0904   WBC 10*3/mm3 11.74* 20.66*   HEMOGLOBIN g/dL 13.8* 16.0   PLATELETS 10*3/mm3 261 315       Results from last 7 days  Lab Units 04/23/17  0510 04/22/17  0904   SODIUM mmol/L 139 142   POTASSIUM mmol/L 4.5 4.4   BUN mg/dL 15 14   CREATININE mg/dL 0.81 0.75         Blood Culture   Date Value Ref Range Status   04/22/2017 No growth at 2 days  Preliminary   04/22/2017 No growth at 2 days  Preliminary     ECHO:  · Left ventricular wall thickness is consistent with mild concentric hypertrophy.  · Left ventricular function is normal. Estimated ejection fraction is 66-70%.  · Left ventricular diastolic dysfunction (grade I) consistent with impaired relaxation.  · Left atrial cavity size is mildly dilated.  · Right ventricular size and systolic function are normal.  · There is no significant (greater than mild) valvular dysfunction.     My interpretation of today's cxr:  Marked improvement in right sided infiltrate, previously mild, now almost resolved     Pulmonary Assessment:  1. Acute severe respiratory failure with hypoxemia, improving, but will require home oxygen  2. Suspected Obstructive Sleep Apnea, not improved or worsening, further workup planned  3. Morbid Obesity stable  4. Leukocytosis-improving  5. Reflux, on meds, continue to treat.  6. Aspiration Pneumonitis improved  7. Grade 1 diastolic dysfunction    Recommend:   · Home oxygen orders entered and pending in discharge  orders  · Sleep study referral entered and pending in discharge orders  · Continue to ambulate  · Will follow up in office    Electronically signed by Reyes Beaver MD, 04/26/17, 8:17 AM

## 2017-04-27 LAB
BACTERIA SPEC AEROBE CULT: NORMAL
BACTERIA SPEC AEROBE CULT: NORMAL

## 2017-04-27 NOTE — PAYOR COMM NOTE
"Dc  Home 17  [id number]    Conner Jose (36 y.o. Male)     Date of Birth Social Security Number Address Home Phone MRN    1980  [address]  [place] [address] [phone] [id number]    Amish Marital Status          None        Admission Date Admission Type Admitting Provider Attending Provider Department, Room/Bed    17 Emergency Donovan Jean Baptiste MD  [facility] 4C, 472/1    Discharge Date Discharge Disposition Discharge Destination        2017 Home or Self Care Home            Attending Provider: (none)    Allergies:  No Known Allergies    Isolation:  None   Infection:  None   Code Status:  Prior    Ht:  72\" (182.9 cm)   Wt:  332 lb (151 kg)    Admission Cmt:  None   Principal Problem:  None                Active Insurance as of 2017     Primary Coverage     Payor Plan Insurance Group Employer/Plan Group    ANTHEM BLUE CROSS ANTHEM BLUE CROSS BLUE SHIELD PPO [id number]     Payor Plan Address Payor Plan Phone Number Effective From Effective To    [address] [phone] [address]     [place] [address]       Subscriber Name Subscriber Birth Date Member ID       CONNER JOSE 1980 [id number]                 Emergency Contacts      (Rel.) Home Phone Work Phone Mobile Phone    Leigh Jose (Spouse) [phone] -- --               Discharge Summary      KAMRYN Chávez at 2017 11:12 AM            Hospital Discharge Summary    Conner Jose  :  1980  MRN:  [id number]    Admit date:  2017  Discharge date:      Admitting Physician:    Donovan Jean Baptiste MD    Discharge Diagnoses:    Active Problems:    Pneumonia of right lung due to infectious organism      Hospital Course:   The patient was admitted for the above noted medical/surgical issues. Please see daily progress note for further details concerning their stay. The patient improved throughout their stay and reached maximum medical improvement on " the day of discharge. The patient/family agree with the treatment plan as outlined above. All questions concerning their stay were answered prior to discharge. They understand the importance of follow up concerning any abnormal test results.   Doing better this AM, took a shower W/O O2.   O2 walks W/O difficulty or desat.  Home O2 has been ordered by .    Discharge Medications:       Suzy Conner GORDY   Home Medication Instructions SANJU:[id number]    Printed on:04/26/17 111   Medication Information                      amLODIPine-benazepril (LOTREL) 5-40 MG per capsule  Take 1 capsule by mouth Daily.             amoxicillin-clavulanate (AUGMENTIN) 875-125 MG per tablet  Take 1 tablet by mouth Every 12 (Twelve) Hours for 7 doses. Indications: Pneumonia             ipratropium-albuterol (DUO-NEB) 0.5-2.5 mg/mL nebulizer  Take 3 mL by nebulization 3 (Three) Times a Day.             levoFLOXacin (LEVAQUIN) 500 MG tablet  Take 1 tablet by mouth Daily for 7 days. Indications: Pneumonia             omeprazole (priLOSEC) 20 MG capsule  Take 20 mg by mouth Daily.             pravastatin (PRAVACHOL) 40 MG tablet  Take 40 mg by mouth Daily.             Respiratory Culture   Order: [id number]   Status:  Final result   Visible to patient:  No (Not Released)   Specimen Information: Bronchus; Sputum        Culture   Heavy growth (4+) Normal Respiratory Ximena      Stain   Greater than 25 WBCs per low power field      Few (2+) Epithelial cells seen      Many (4+) Mixed gram positive ximena      Resulting Agency: [facility]      Specimen Collected: 04/24/17  8:10 AM   Last Resulted: 04/26/17  7:11 AM                     Consults:       Significant Diagnostic Studies:    See summary of labs/x-rays/path report for further details      Treatments:   IV ABX,NEB,Pulm consult, Home O2,out pt sleep study    Disposition:   Home , with his Son  Follow up with Donovan Jean Baptiste MD in 5 days.    Signed:  KAMRYN Chváez  PA  4/26/2017, 11:12 AM     Electronically signed by KAMRYN Chávez at 4/26/2017 11:19 AM

## 2017-05-17 ENCOUNTER — HOSPITAL ENCOUNTER (OUTPATIENT)
Dept: GENERAL RADIOLOGY | Facility: HOSPITAL | Age: 37
Discharge: HOME | End: 2017-05-17
Attending: FAMILY MEDICINE | Admitting: FAMILY MEDICINE

## 2017-05-17 ENCOUNTER — TRANSCRIBE ORDERS (OUTPATIENT)
Dept: ADMINISTRATIVE | Facility: HOSPITAL | Age: 37
End: 2017-05-17

## 2017-05-17 DIAGNOSIS — J18.9 PNEUMONIA OF LOWER LOBE DUE TO INFECTIOUS ORGANISM, UNSPECIFIED LATERALITY: Primary | ICD-10-CM

## 2017-05-17 PROCEDURE — 71020 HC CHEST PA AND LATERAL: CPT

## 2019-06-12 ENCOUNTER — HOSPITAL ENCOUNTER (OUTPATIENT)
Dept: GENERAL RADIOLOGY | Facility: HOSPITAL | Age: 39
Discharge: HOME | End: 2019-06-12
Admitting: NURSE PRACTITIONER

## 2019-06-12 ENCOUNTER — TRANSCRIBE ORDERS (OUTPATIENT)
Dept: ADMINISTRATIVE | Facility: HOSPITAL | Age: 39
End: 2019-06-12

## 2019-06-12 DIAGNOSIS — R60.0 LOWER EXTREMITY EDEMA: Primary | ICD-10-CM

## 2019-06-12 DIAGNOSIS — R60.0 LOWER EXTREMITY EDEMA: ICD-10-CM

## 2019-06-12 PROCEDURE — 71046 X-RAY EXAM CHEST 2 VIEWS: CPT

## 2019-06-13 ENCOUNTER — TRANSCRIBE ORDERS (OUTPATIENT)
Dept: ADMINISTRATIVE | Facility: HOSPITAL | Age: 39
End: 2019-06-13

## 2019-06-13 DIAGNOSIS — R60.0 LOWER EXTREMITY EDEMA: Primary | ICD-10-CM

## 2019-06-14 ENCOUNTER — HOSPITAL ENCOUNTER (OUTPATIENT)
Dept: CARDIOLOGY | Facility: HOSPITAL | Age: 39
Discharge: HOME | End: 2019-06-14
Admitting: NURSE PRACTITIONER

## 2019-06-14 VITALS
WEIGHT: 315 LBS | HEIGHT: 72 IN | SYSTOLIC BLOOD PRESSURE: 145 MMHG | DIASTOLIC BLOOD PRESSURE: 99 MMHG | BODY MASS INDEX: 42.66 KG/M2

## 2019-06-14 DIAGNOSIS — R60.0 LOWER EXTREMITY EDEMA: ICD-10-CM

## 2019-06-14 LAB
BH CV ECHO MEAS - AO MAX PG (FULL): 1.4 MMHG
BH CV ECHO MEAS - AO MAX PG: 6.9 MMHG
BH CV ECHO MEAS - AO MEAN PG (FULL): 0 MMHG
BH CV ECHO MEAS - AO MEAN PG: 4 MMHG
BH CV ECHO MEAS - AO ROOT AREA (BSA CORRECTED): 0.87
BH CV ECHO MEAS - AO ROOT AREA: 4.2 CM^2
BH CV ECHO MEAS - AO ROOT DIAM: 2.3 CM
BH CV ECHO MEAS - AO V2 MAX: 131 CM/SEC
BH CV ECHO MEAS - AO V2 MEAN: 99.8 CM/SEC
BH CV ECHO MEAS - AO V2 VTI: 27.3 CM
BH CV ECHO MEAS - AVA(I,A): 3 CM^2
BH CV ECHO MEAS - AVA(I,D): 3 CM^2
BH CV ECHO MEAS - AVA(V,A): 3.1 CM^2
BH CV ECHO MEAS - AVA(V,D): 3.1 CM^2
BH CV ECHO MEAS - BSA(HAYCOCK): 2.8 M^2
BH CV ECHO MEAS - BSA: 2.6 M^2
BH CV ECHO MEAS - BZI_BMI: 45 KILOGRAMS/M^2
BH CV ECHO MEAS - BZI_METRIC_HEIGHT: 182.9 CM
BH CV ECHO MEAS - BZI_METRIC_WEIGHT: 150.6 KG
BH CV ECHO MEAS - EDV(CUBED): 147.2 ML
BH CV ECHO MEAS - EDV(MOD-SP4): 165 ML
BH CV ECHO MEAS - EDV(TEICH): 134.2 ML
BH CV ECHO MEAS - EF(CUBED): 77.3 %
BH CV ECHO MEAS - EF(MOD-SP4): 68.7 %
BH CV ECHO MEAS - EF(TEICH): 69 %
BH CV ECHO MEAS - ESV(CUBED): 33.4 ML
BH CV ECHO MEAS - ESV(MOD-SP4): 51.6 ML
BH CV ECHO MEAS - ESV(TEICH): 41.6 ML
BH CV ECHO MEAS - FS: 39 %
BH CV ECHO MEAS - IVS/LVPW: 1.1
BH CV ECHO MEAS - IVSD: 1.2 CM
BH CV ECHO MEAS - LA DIMENSION: 4.6 CM
BH CV ECHO MEAS - LA/AO: 2
BH CV ECHO MEAS - LAT PEAK E' VEL: 9.6 CM/SEC
BH CV ECHO MEAS - LV DIASTOLIC VOL/BSA (35-75): 62.4 ML/M^2
BH CV ECHO MEAS - LV MASS(C)D: 246.3 GRAMS
BH CV ECHO MEAS - LV MASS(C)DI: 93.2 GRAMS/M^2
BH CV ECHO MEAS - LV MAX PG: 5.5 MMHG
BH CV ECHO MEAS - LV MEAN PG: 4 MMHG
BH CV ECHO MEAS - LV SYSTOLIC VOL/BSA (12-30): 19.5 ML/M^2
BH CV ECHO MEAS - LV V1 MAX: 117 CM/SEC
BH CV ECHO MEAS - LV V1 MEAN: 89.9 CM/SEC
BH CV ECHO MEAS - LV V1 VTI: 24 CM
BH CV ECHO MEAS - LVIDD: 5.3 CM
BH CV ECHO MEAS - LVIDS: 3.2 CM
BH CV ECHO MEAS - LVLD AP4: 9.3 CM
BH CV ECHO MEAS - LVLS AP4: 7 CM
BH CV ECHO MEAS - LVOT AREA (M): 3.5 CM^2
BH CV ECHO MEAS - LVOT AREA: 3.5 CM^2
BH CV ECHO MEAS - LVOT DIAM: 2.1 CM
BH CV ECHO MEAS - LVPWD: 1.1 CM
BH CV ECHO MEAS - MED PEAK E' VEL: 6.2 CM/SEC
BH CV ECHO MEAS - MV A MAX VEL: 82.9 CM/SEC
BH CV ECHO MEAS - MV DEC TIME: 0.2 SEC
BH CV ECHO MEAS - MV E MAX VEL: 114 CM/SEC
BH CV ECHO MEAS - MV E/A: 1.4
BH CV ECHO MEAS - PA MAX PG: 2.2 MMHG
BH CV ECHO MEAS - PA V2 MAX: 74.2 CM/SEC
BH CV ECHO MEAS - SI(AO): 42.9 ML/M^2
BH CV ECHO MEAS - SI(CUBED): 43.1 ML/M^2
BH CV ECHO MEAS - SI(LVOT): 31.5 ML/M^2
BH CV ECHO MEAS - SI(MOD-SP4): 42.9 ML/M^2
BH CV ECHO MEAS - SI(TEICH): 35 ML/M^2
BH CV ECHO MEAS - SV(AO): 113.4 ML
BH CV ECHO MEAS - SV(CUBED): 113.8 ML
BH CV ECHO MEAS - SV(LVOT): 83.1 ML
BH CV ECHO MEAS - SV(MOD-SP4): 113.4 ML
BH CV ECHO MEAS - SV(TEICH): 92.6 ML
BH CV ECHO MEASUREMENTS AVERAGE E/E' RATIO: 14.43
LEFT ATRIUM VOLUME INDEX: 22.9 ML/M2
LEFT ATRIUM VOLUME: 60.4 CM3
MAXIMAL PREDICTED HEART RATE: 180 BPM
STRESS TARGET HR: 153 BPM

## 2019-06-14 PROCEDURE — 93306 TTE W/DOPPLER COMPLETE: CPT

## 2019-06-14 PROCEDURE — 25010000002 PERFLUTREN 6.52 MG/ML SUSPENSION: Performed by: FAMILY MEDICINE

## 2019-06-14 PROCEDURE — 93306 TTE W/DOPPLER COMPLETE: CPT | Performed by: INTERNAL MEDICINE

## 2019-06-14 RX ADMIN — PERFLUTREN 9.78 MG: 6.52 INJECTION, SUSPENSION INTRAVENOUS at 08:52

## 2019-12-18 ENCOUNTER — TRANSCRIBE ORDERS (OUTPATIENT)
Dept: ADMINISTRATIVE | Facility: HOSPITAL | Age: 39
End: 2019-12-18

## 2019-12-18 DIAGNOSIS — R80.9 PROTEINURIA, UNSPECIFIED TYPE: Primary | ICD-10-CM

## 2019-12-20 ENCOUNTER — HOSPITAL ENCOUNTER (OUTPATIENT)
Dept: ULTRASOUND IMAGING | Facility: HOSPITAL | Age: 39
Discharge: HOME | End: 2019-12-20
Admitting: FAMILY MEDICINE

## 2019-12-20 DIAGNOSIS — R80.9 PROTEINURIA, UNSPECIFIED TYPE: ICD-10-CM

## 2019-12-20 PROCEDURE — 76775 US EXAM ABDO BACK WALL LIM: CPT

## 2019-12-28 ENCOUNTER — APPOINTMENT (OUTPATIENT)
Dept: GENERAL RADIOLOGY | Facility: HOSPITAL | Age: 39
End: 2019-12-28

## 2019-12-28 ENCOUNTER — HOSPITAL ENCOUNTER (EMERGENCY)
Facility: HOSPITAL | Age: 39
Discharge: HOME | End: 2019-12-28
Attending: INTERNAL MEDICINE | Admitting: FAMILY MEDICINE

## 2019-12-28 VITALS
RESPIRATION RATE: 18 BRPM | HEIGHT: 70 IN | DIASTOLIC BLOOD PRESSURE: 70 MMHG | TEMPERATURE: 100.8 F | WEIGHT: 315 LBS | BODY MASS INDEX: 45.1 KG/M2 | OXYGEN SATURATION: 99 % | SYSTOLIC BLOOD PRESSURE: 122 MMHG | HEART RATE: 95 BPM

## 2019-12-28 DIAGNOSIS — J96.21 ACUTE ON CHRONIC RESPIRATORY FAILURE WITH HYPOXEMIA (HCC): ICD-10-CM

## 2019-12-28 DIAGNOSIS — J10.1 INFLUENZA A: Primary | ICD-10-CM

## 2019-12-28 LAB
FLUAV AG NPH QL: POSITIVE
FLUBV AG NPH QL IA: NEGATIVE

## 2019-12-28 PROCEDURE — 99284 EMERGENCY DEPT VISIT MOD MDM: CPT

## 2019-12-28 PROCEDURE — 71045 X-RAY EXAM CHEST 1 VIEW: CPT

## 2019-12-28 PROCEDURE — 87804 INFLUENZA ASSAY W/OPTIC: CPT | Performed by: INTERNAL MEDICINE

## 2019-12-28 RX ORDER — LOSARTAN POTASSIUM 100 MG/1
100 TABLET ORAL DAILY
COMMUNITY

## 2019-12-28 RX ORDER — IBUPROFEN 800 MG/1
800 TABLET ORAL ONCE
Status: DISCONTINUED | OUTPATIENT
Start: 2019-12-28 | End: 2019-12-28 | Stop reason: HOSPADM

## 2019-12-28 RX ORDER — OSELTAMIVIR PHOSPHATE 75 MG/1
75 CAPSULE ORAL 2 TIMES DAILY
Qty: 10 CAPSULE | Refills: 0 | Status: SHIPPED | OUTPATIENT
Start: 2019-12-28 | End: 2020-01-23

## 2019-12-28 RX ORDER — IBUPROFEN 800 MG/1
800 TABLET ORAL ONCE
Status: COMPLETED | OUTPATIENT
Start: 2019-12-28 | End: 2019-12-28

## 2019-12-28 RX ADMIN — IBUPROFEN 800 MG: 800 TABLET, FILM COATED ORAL at 04:16

## 2020-01-23 ENCOUNTER — OFFICE VISIT (OUTPATIENT)
Dept: GASTROENTEROLOGY | Facility: CLINIC | Age: 40
End: 2020-01-23

## 2020-01-23 VITALS
TEMPERATURE: 96.5 F | HEART RATE: 93 BPM | WEIGHT: 315 LBS | HEIGHT: 71 IN | SYSTOLIC BLOOD PRESSURE: 136 MMHG | BODY MASS INDEX: 44.1 KG/M2 | DIASTOLIC BLOOD PRESSURE: 80 MMHG | OXYGEN SATURATION: 94 %

## 2020-01-23 DIAGNOSIS — I10 ESSENTIAL HYPERTENSION: ICD-10-CM

## 2020-01-23 DIAGNOSIS — Z80.0 FAMILY HX OF COLON CANCER: Primary | ICD-10-CM

## 2020-01-23 PROCEDURE — S0285 CNSLT BEFORE SCREEN COLONOSC: HCPCS | Performed by: NURSE PRACTITIONER

## 2020-01-23 NOTE — PROGRESS NOTES
[facility]    Primary Physician Donovan Jean Baptiste MD    1/23/2020    Conner Almonte   1980      Chief Complaint   Patient presents with   • Colonoscopy       Subjective     HPI    Conner Almonte is a 39 y.o. male who presents as a referral for preventative maintenance. He has no complaints of nausea or vomiting. No change in bowels. No wt loss. No BRBPR. No melena. No abdominal pain.        No history of colonoscopy. The patient does not have history of colon polyps. The patient does not have history of colon cancer.   There is family history of colon polyps. There is  family history of colon cancer father in his late 50's, paternal GF.       He recently had the flu. He also has h/o pneumonia. He does use oxygen prn.     Past Medical History:   Diagnosis Date   • GERD (gastroesophageal reflux disease)    • Hyperlipidemia    • Hypertension        Past Surgical History:   Procedure Laterality Date   • ADENOIDECTOMY     • CHOLECYSTECTOMY     • TONSILLECTOMY     • TYMPANOSTOMY TUBE PLACEMENT         Outpatient Medications Marked as Taking for the 1/23/20 encounter (Office Visit) with Nicki Man APRN   Medication Sig Dispense Refill   • ipratropium-albuterol (DUO-NEB) 0.5-2.5 mg/mL nebulizer Take 3 mL by nebulization 3 (Three) Times a Day. 360 mL 1   • losartan (COZAAR) 25 MG tablet Take 25 mg by mouth Daily.     • omeprazole (priLOSEC) 20 MG capsule Take 20 mg by mouth Daily.     • pravastatin (PRAVACHOL) 40 MG tablet Take 40 mg by mouth Daily.         No Known Allergies    Social History     Socioeconomic History   • Marital status:      Spouse name: Not on file   • Number of children: Not on file   • Years of education: Not on file   • Highest education level: Not on file   Tobacco Use   • Smoking status: Former Smoker     Types: Cigarettes   • Smokeless tobacco: Never Used   Substance and Sexual Activity   • Alcohol use: Yes     Comment: beer every weekend   • Drug use:  No   • Sexual activity: Defer       Family History   Problem Relation Age of Onset   • Colon cancer Father    • Colon polyps Neg Hx        Review of Systems   Constitutional: Negative for appetite change, chills, fatigue, fever and unexpected weight change.   HENT: Negative for sore throat and trouble swallowing.    Eyes: Negative for visual disturbance.   Respiratory: Positive for shortness of breath (chronic, mild ). Negative for wheezing.    Cardiovascular: Negative for chest pain and palpitations.   Gastrointestinal: Negative for abdominal distention, abdominal pain, anal bleeding, blood in stool, constipation, diarrhea, nausea and vomiting.        As mentioned in hpi   Genitourinary: Negative for difficulty urinating and hematuria.   Musculoskeletal: Negative for arthralgias and back pain.   Skin: Negative for color change and rash.   Neurological: Negative for dizziness, seizures, syncope, light-headedness and headaches.   Hematological: Negative for adenopathy.   Psychiatric/Behavioral: Negative for confusion. The patient is not nervous/anxious.        Objective     Vitals:    01/23/20 0910   BP: 136/80   Pulse: 93   Temp: 96.5 °F (35.8 °C)   SpO2: 94%         01/23/20  0910   Weight: (!) 162 kg (358 lb)     Body mass index is 50.64 kg/m².    Physical Exam   Constitutional: He appears well-developed and well-nourished. No distress.   HENT:   Head: Normocephalic and atraumatic.   Eyes: EOM are normal. No scleral icterus.   Neck: Neck supple. No JVD present.   Cardiovascular: Normal rate, regular rhythm and normal heart sounds.   Pulmonary/Chest: Effort normal and breath sounds normal.   Abdominal: Soft. Bowel sounds are normal. He exhibits no distension. There is no tenderness.   Musculoskeletal: Normal range of motion. He exhibits no deformity.   Neurological: He is alert.   Skin: Skin is warm and dry. No rash noted.   Psychiatric: He has a normal mood and affect. His behavior is normal.   Vitals  reviewed.      Imaging Results (Most Recent)     None          Assessment/Plan     Conner was seen today for colonoscopy.    Diagnoses and all orders for this visit:    Family hx of colon cancer  -     Case Request; Standing  -     Case Request    Essential hypertension    Other orders  -     Follow Anesthesia Guidelines / Standing Orders; Future  -     Obtain Informed Consent; Future  -     polyethylene glycol (GOLYTELY) 236 g solution; Take 4,000 mL by mouth 1 (One) Time for 1 dose. Take as directed per instruction sheet.            Plan for colonoscopy. The patient was advised to take any blood pressure or heart  medications the morning of  procedure if that is when he/she normally takes.             Body mass index is 50.64 kg/m².    Patient's Body mass index is 50.64 kg/m². BMI is above normal parameters. Recommendations include: no follow up ,recommend weight loss .      COLONOSCOPY WITH ANESTHESIA (N/A)  All risks, benefits, alternatives, and indications of colonoscopy procedure have been discussed with the patient. Risks to include perforation of the colon requiring possible surgery or colostomy, risk of bleeding from biopsies or removal of colon tissue, possibility of missing a colon polyp or cancer, or adverse drug reaction.  Benefits to include the diagnosis and management of disease of the colon and rectum. Alternatives to include barium enema, radiographic evaluation, lab testing or no intervention. Pt verbalizes understanding and agrees.         RANDY Modi      EMR Dragon/transcription disclaimer:  Much of this encounter note is electronic transcription/translation of spoken language to printed text.  The electronic translation of spoken language may be erroneous, or at times, nonsensical words or phrases may be inadvertently transcribed.  Although I have reviewed the note for such errors, some may still exist.

## 2020-02-12 ENCOUNTER — ANESTHESIA (OUTPATIENT)
Dept: GASTROENTEROLOGY | Facility: HOSPITAL | Age: 40
End: 2020-02-12

## 2020-02-12 ENCOUNTER — ANESTHESIA EVENT (OUTPATIENT)
Dept: GASTROENTEROLOGY | Facility: HOSPITAL | Age: 40
End: 2020-02-12

## 2020-02-12 ENCOUNTER — HOSPITAL ENCOUNTER (OUTPATIENT)
Facility: HOSPITAL | Age: 40
Setting detail: HOSPITAL OUTPATIENT SURGERY
Discharge: HOME | End: 2020-02-12
Attending: INTERNAL MEDICINE | Admitting: INTERNAL MEDICINE

## 2020-02-12 VITALS
SYSTOLIC BLOOD PRESSURE: 164 MMHG | BODY MASS INDEX: 44.1 KG/M2 | HEART RATE: 89 BPM | RESPIRATION RATE: 24 BRPM | HEIGHT: 71 IN | DIASTOLIC BLOOD PRESSURE: 85 MMHG | TEMPERATURE: 97.3 F | OXYGEN SATURATION: 98 % | WEIGHT: 315 LBS

## 2020-02-12 DIAGNOSIS — Z80.0 FAMILY HX OF COLON CANCER: ICD-10-CM

## 2020-02-12 PROCEDURE — 25010000002 PROPOFOL 10 MG/ML EMULSION: Performed by: NURSE ANESTHETIST, CERTIFIED REGISTERED

## 2020-02-12 PROCEDURE — 88305 TISSUE EXAM BY PATHOLOGIST: CPT | Performed by: INTERNAL MEDICINE

## 2020-02-12 PROCEDURE — 45385 COLONOSCOPY W/LESION REMOVAL: CPT | Performed by: INTERNAL MEDICINE

## 2020-02-12 RX ORDER — ONDANSETRON 2 MG/ML
4 INJECTION INTRAMUSCULAR; INTRAVENOUS ONCE AS NEEDED
Status: DISCONTINUED | OUTPATIENT
Start: 2020-02-12 | End: 2020-02-12 | Stop reason: HOSPADM

## 2020-02-12 RX ORDER — SODIUM CHLORIDE 0.9 % (FLUSH) 0.9 %
10 SYRINGE (ML) INJECTION AS NEEDED
Status: DISCONTINUED | OUTPATIENT
Start: 2020-02-12 | End: 2020-02-12 | Stop reason: HOSPADM

## 2020-02-12 RX ORDER — SODIUM CHLORIDE 9 MG/ML
500 INJECTION, SOLUTION INTRAVENOUS CONTINUOUS PRN
Status: DISCONTINUED | OUTPATIENT
Start: 2020-02-12 | End: 2020-02-12 | Stop reason: HOSPADM

## 2020-02-12 RX ORDER — PROPOFOL 10 MG/ML
VIAL (ML) INTRAVENOUS AS NEEDED
Status: DISCONTINUED | OUTPATIENT
Start: 2020-02-12 | End: 2020-02-12 | Stop reason: SURG

## 2020-02-12 RX ORDER — LIDOCAINE HYDROCHLORIDE 10 MG/ML
0.5 INJECTION, SOLUTION EPIDURAL; INFILTRATION; INTRACAUDAL; PERINEURAL ONCE AS NEEDED
Status: DISCONTINUED | OUTPATIENT
Start: 2020-02-12 | End: 2020-02-12 | Stop reason: HOSPADM

## 2020-02-12 RX ADMIN — SODIUM CHLORIDE: 0.9 INJECTION, SOLUTION INTRAVENOUS at 08:49

## 2020-02-12 RX ADMIN — PROPOFOL 400 MG: 10 INJECTION, EMULSION INTRAVENOUS at 09:01

## 2020-02-12 NOTE — ANESTHESIA POSTPROCEDURE EVALUATION
"Patient: Conner Almonte    Procedure Summary     Date:  02/12/20 Room / Location:  [facility] ENDOSCOPY 2 / [facility]OSCOPY    Anesthesia Start:  0858 Anesthesia Stop:  0924    Procedure:  COLONOSCOPY WITH ANESTHESIA (N/A ) Diagnosis:       Family hx of colon cancer      (Family hx of colon cancer [Z80.0])    Surgeon:  Souleymane Kinsey MD Provider:  Josef Kraus CRNA    Anesthesia Type:  MAC ASA Status:  3          Anesthesia Type: MAC    Vitals  Vitals Value Taken Time   /93 2/12/2020  9:25 AM   Temp     Pulse 106 2/12/2020  9:28 AM   Resp 21 2/12/2020  9:20 AM   SpO2 93 % 2/12/2020  9:28 AM   Vitals shown include unvalidated device data.        Post Anesthesia Care and Evaluation    Patient location during evaluation: PACU  Patient participation: complete - patient participated  Level of consciousness: awake and alert  Pain management: adequate  Airway patency: patent  Anesthetic complications: No anesthetic complications    Cardiovascular status: acceptable  Respiratory status: acceptable  Hydration status: acceptable    Comments: Blood pressure 147/75, pulse 89, temperature 97.3 °F (36.3 °C), temperature source Temporal, resp. rate 21, height 180.3 cm (71\"), weight (!) 162 kg (358 lb), SpO2 98 %.    Pt discharged from PACU based on flako score >8      "

## 2020-02-12 NOTE — ANESTHESIA PREPROCEDURE EVALUATION
Anesthesia Evaluation     Patient summary reviewed   no history of anesthetic complications:  NPO Solid Status: > 8 hours             Airway   Mallampati: II  TM distance: >3 FB  Neck ROM: full  Dental      Pulmonary    (+) home oxygen, sleep apnea,   Cardiovascular   Exercise tolerance: good (4-7 METS)    (+) hypertension, hyperlipidemia,       Neuro/Psych- negative ROS  GI/Hepatic/Renal/Endo    (+) morbid obesity,      Musculoskeletal     Abdominal    Substance History      OB/GYN          Other                        Anesthesia Plan    ASA 3     MAC       Anesthetic plan, all risks, benefits, and alternatives have been provided, discussed and informed consent has been obtained with: patient.

## 2020-02-13 LAB
CYTO UR: NORMAL
LAB AP CASE REPORT: NORMAL
LAB AP CLINICAL INFORMATION: NORMAL
LAB AP FLOW CYTOMETRY SUMMARY: NORMAL
PATH REPORT.FINAL DX SPEC: NORMAL
PATH REPORT.GROSS SPEC: NORMAL

## 2022-03-09 ENCOUNTER — HOSPITAL ENCOUNTER (EMERGENCY)
Facility: HOSPITAL | Age: 42
Discharge: HOME | End: 2022-03-09
Attending: EMERGENCY MEDICINE | Admitting: EMERGENCY MEDICINE

## 2022-03-09 VITALS
BODY MASS INDEX: 45.1 KG/M2 | TEMPERATURE: 97.7 F | SYSTOLIC BLOOD PRESSURE: 137 MMHG | OXYGEN SATURATION: 94 % | DIASTOLIC BLOOD PRESSURE: 98 MMHG | RESPIRATION RATE: 18 BRPM | HEIGHT: 70 IN | WEIGHT: 315 LBS | HEART RATE: 78 BPM

## 2022-03-09 DIAGNOSIS — J06.9 VIRAL URI: Primary | ICD-10-CM

## 2022-03-09 LAB — SARS-COV-2 RNA PNL SPEC NAA+PROBE: NOT DETECTED

## 2022-03-09 PROCEDURE — 87635 SARS-COV-2 COVID-19 AMP PRB: CPT | Performed by: EMERGENCY MEDICINE

## 2022-03-09 PROCEDURE — C9803 HOPD COVID-19 SPEC COLLECT: HCPCS

## 2022-03-09 PROCEDURE — 99283 EMERGENCY DEPT VISIT LOW MDM: CPT

## 2022-03-09 NOTE — ED NOTES
Pt comes to er from home via pov c/o covid exposure, intermittent cough with itchy eyes, cpap used at home for sleep, a&ox3, stable, no distress, non labored breathing, ambulatory, no n/v/d, wife was diagnosed with covid+ within past week, denies smoke/drugs/occasional alcohol     Anson Mendoza, RN  03/09/22 9269

## 2022-03-09 NOTE — ED PROVIDER NOTES
Subjective   41 old male presents to the ER with complaint of cough, sore throat, headache.  He has a history of obesity, obstructive sleep apnea, hypertension hyperlipidemia.  He has known Covid exposure, wife tested positive COVID 4 to 5 days ago.  Patient developed headache a few days ago.  He now has mild cough, sore throat, rhinorrhea and sneezing.  No change in smell or taste.  He has been vaccinated for COVID-19 but has not received his booster.  No reports of fever or body aches.  Symptoms are mild in severity with no aggravating leaving factors.  Does report a history of lung problems following episode of aspiration a few months ago.       History provided by:  Patient      Review of Systems   All other systems reviewed and are negative.      Past Medical History:   Diagnosis Date   • GERD (gastroesophageal reflux disease)    • Hyperlipidemia    • Hypertension        No Known Allergies    Past Surgical History:   Procedure Laterality Date   • ADENOIDECTOMY     • CHOLECYSTECTOMY     • COLONOSCOPY N/A 2/12/2020    Procedure: COLONOSCOPY WITH ANESTHESIA;  Surgeon: Souleymane Kinsey MD;  Location: [facility] ENDOSCOPY;  Service: Gastroenterology;  Laterality: N/A;  Pre:Family Hx Colon Polyps  Post: Colon Polyps  Dr. Jean Baptiste  CO2 Inflation Used   • TONSILLECTOMY     • TYMPANOSTOMY TUBE PLACEMENT         Family History   Problem Relation Age of Onset   • Colon cancer Father    • Colon polyps Neg Hx        Social History     Socioeconomic History   • Marital status:    Tobacco Use   • Smoking status: Former Smoker     Types: Cigarettes   • Smokeless tobacco: Never Used   Vaping Use   • Vaping Use: Never used   Substance and Sexual Activity   • Alcohol use: Yes     Comment: beer every weekend   • Drug use: No   • Sexual activity: Defer           Objective   Physical Exam  Vitals and nursing note reviewed.   Constitutional:       General: He is not in acute distress.     Appearance: Normal appearance. He is not  ill-appearing.   HENT:      Head: Normocephalic and atraumatic.   Eyes:      Extraocular Movements: Extraocular movements intact.      Conjunctiva/sclera: Conjunctivae normal.      Pupils: Pupils are equal, round, and reactive to light.   Cardiovascular:      Rate and Rhythm: Normal rate and regular rhythm.      Pulses: Normal pulses.      Heart sounds: Normal heart sounds. No murmur heard.    No friction rub. No gallop.   Pulmonary:      Effort: Pulmonary effort is normal.      Breath sounds: Normal breath sounds. No wheezing, rhonchi or rales.   Abdominal:      General: Abdomen is flat. Bowel sounds are normal. There is no distension.      Palpations: Abdomen is soft.      Tenderness: There is no abdominal tenderness. There is no guarding or rebound.   Musculoskeletal:         General: Normal range of motion.      Right lower leg: No edema.      Left lower leg: No edema.   Skin:     General: Skin is warm and dry.      Capillary Refill: Capillary refill takes less than 2 seconds.      Findings: No rash.   Neurological:      General: No focal deficit present.      Mental Status: He is alert and oriented to person, place, and time. Mental status is at baseline.         Procedures           ED Course  ED Course as of 03/09/22 0733   Wed Mar 09, 2022   0637 41 male presents to the ER with Covid symptoms, has a known Covid exposure.  His wife was recent diagnosed with COVID-19.  He has several risk factors that would put him at high risk for serious illness and will qualify for monoclonal antibody fusion if Covid is positive.    He did verbally consent to treatment of this emergency authorize medication.    Signing over care to Dr. Early. [AW]      ED Course User Index  [AW] Mick Blackwood MD           Lab Results (last 24 hours)     Procedure Component Value Units Date/Time    COVID-19,[facility] [employer] IN-HOUSE,Nasal Swab No Transport Media 3-4 HR TAT - Swab, Nasal Cavity [[id number]]  (Normal) Collected: 03/09/22 0602     Specimen: Swab from Nasal Cavity Updated: 03/09/22 0718     COVID19 Not Detected    Narrative:      Fact sheet for providers: https://www.fda.gov/media/348628/download     Fact sheet for patients: https://www.fda.gov/media/950841/download    Test performed by PCR.    Consider negative results in combination with clinical observations, patient history, and epidemiological information.        Covid test was negative.  Patient most likely has a viral URI.  I did discuss the possibility of a false negative with the patient.  He is to retest in 24 to 48 hours if symptoms persist.  He is return to the ER for any chest pain, shortness of air, fever or other concerns.  Patient agreeable.                                      MDM    Final diagnoses:   Viral URI       ED Disposition  ED Disposition     ED Disposition   Discharge    Condition   Good    Comment   --             Donovan Jean Baptiste MD  [address] [place] [id number]  [phone]    Schedule an appointment as soon as possible for a visit            Medication List      No changes were made to your prescriptions during this visit.          Emy Moody MD  03/09/22 0735

## 2022-12-26 ENCOUNTER — APPOINTMENT (OUTPATIENT)
Dept: CT IMAGING | Facility: HOSPITAL | Age: 42
DRG: 193 | End: 2022-12-26
Payer: COMMERCIAL

## 2022-12-26 ENCOUNTER — APPOINTMENT (OUTPATIENT)
Dept: GENERAL RADIOLOGY | Facility: HOSPITAL | Age: 42
DRG: 193 | End: 2022-12-26
Payer: COMMERCIAL

## 2022-12-26 ENCOUNTER — HOSPITAL ENCOUNTER (INPATIENT)
Facility: HOSPITAL | Age: 42
LOS: 4 days | Discharge: HOME | DRG: 193 | End: 2022-12-30
Attending: FAMILY MEDICINE
Payer: COMMERCIAL

## 2022-12-26 DIAGNOSIS — J18.9 PNEUMONIA DUE TO INFECTIOUS ORGANISM, UNSPECIFIED LATERALITY, UNSPECIFIED PART OF LUNG: Primary | ICD-10-CM

## 2022-12-26 DIAGNOSIS — R09.02 HYPOXIA: ICD-10-CM

## 2022-12-26 LAB
ALBUMIN SERPL-MCNC: 4.4 G/DL (ref 3.5–5.2)
ALBUMIN/GLOB SERPL: 1.3 G/DL
ALP SERPL-CCNC: 73 U/L (ref 39–117)
ALT SERPL W P-5'-P-CCNC: 32 U/L (ref 1–41)
ANION GAP SERPL CALCULATED.3IONS-SCNC: 11 MMOL/L (ref 5–15)
ARTERIAL PATENCY WRIST A: POSITIVE
AST SERPL-CCNC: 18 U/L (ref 1–40)
ATMOSPHERIC PRESS: 754 MMHG
BASE EXCESS BLDA CALC-SCNC: 4.5 MMOL/L (ref 0–2)
BASOPHILS # BLD AUTO: 0.09 10*3/MM3 (ref 0–0.2)
BASOPHILS NFR BLD AUTO: 0.5 % (ref 0–1.5)
BDY SITE: ABNORMAL
BILIRUB SERPL-MCNC: 1.3 MG/DL (ref 0–1.2)
BODY TEMPERATURE: 37 C
BUN SERPL-MCNC: 11 MG/DL (ref 6–20)
BUN/CREAT SERPL: 15.1 (ref 7–25)
CALCIUM SPEC-SCNC: 9.1 MG/DL (ref 8.6–10.5)
CHLORIDE SERPL-SCNC: 97 MMOL/L (ref 98–107)
CO2 SERPL-SCNC: 27 MMOL/L (ref 22–29)
CREAT SERPL-MCNC: 0.73 MG/DL (ref 0.76–1.27)
D DIMER PPP FEU-MCNC: 0.41 MCGFEU/ML (ref 0–0.5)
D-LACTATE SERPL-SCNC: 1.8 MMOL/L (ref 0.5–2)
D-LACTATE SERPL-SCNC: 2.4 MMOL/L (ref 0.5–2)
DEPRECATED RDW RBC AUTO: 46.9 FL (ref 37–54)
EGFRCR SERPLBLD CKD-EPI 2021: 115.1 ML/MIN/1.73
EOSINOPHIL # BLD AUTO: 0.07 10*3/MM3 (ref 0–0.4)
EOSINOPHIL NFR BLD AUTO: 0.4 % (ref 0.3–6.2)
ERYTHROCYTE [DISTWIDTH] IN BLOOD BY AUTOMATED COUNT: 14.8 % (ref 12.3–15.4)
FLUAV RNA RESP QL NAA+PROBE: NOT DETECTED
FLUBV RNA RESP QL NAA+PROBE: NOT DETECTED
GLOBULIN UR ELPH-MCNC: 3.5 GM/DL
GLUCOSE SERPL-MCNC: 147 MG/DL (ref 65–99)
HCO3 BLDA-SCNC: 27.4 MMOL/L (ref 20–26)
HCT VFR BLD AUTO: 46.7 % (ref 37.5–51)
HGB BLD-MCNC: 15 G/DL (ref 13–17.7)
HOLD SPECIMEN: NORMAL
IMM GRANULOCYTES # BLD AUTO: 0.19 10*3/MM3 (ref 0–0.05)
IMM GRANULOCYTES NFR BLD AUTO: 1 % (ref 0–0.5)
INHALED O2 CONCENTRATION: 21 %
INR PPP: 1.02 (ref 0.91–1.09)
LYMPHOCYTES # BLD AUTO: 0.76 10*3/MM3 (ref 0.7–3.1)
LYMPHOCYTES NFR BLD AUTO: 4.1 % (ref 19.6–45.3)
Lab: ABNORMAL
Lab: ABNORMAL
MCH RBC QN AUTO: 27.9 PG (ref 26.6–33)
MCHC RBC AUTO-ENTMCNC: 32.1 G/DL (ref 31.5–35.7)
MCV RBC AUTO: 86.8 FL (ref 79–97)
MODALITY: ABNORMAL
MONOCYTES # BLD AUTO: 1.53 10*3/MM3 (ref 0.1–0.9)
MONOCYTES NFR BLD AUTO: 8.2 % (ref 5–12)
NEUTROPHILS NFR BLD AUTO: 16.03 10*3/MM3 (ref 1.7–7)
NEUTROPHILS NFR BLD AUTO: 85.8 % (ref 42.7–76)
NOTIFIED BY: ABNORMAL
NOTIFIED WHO: ABNORMAL
NRBC BLD AUTO-RTO: 0 /100 WBC (ref 0–0.2)
NT-PROBNP SERPL-MCNC: 39.1 PG/ML (ref 0–450)
PCO2 BLDA: 34.6 MM HG (ref 35–45)
PCO2 TEMP ADJ BLD: 34.6 MM HG (ref 35–45)
PH BLDA: 7.51 PH UNITS (ref 7.35–7.45)
PH, TEMP CORRECTED: 7.51 PH UNITS (ref 7.35–7.45)
PLATELET # BLD AUTO: 296 10*3/MM3 (ref 140–450)
PMV BLD AUTO: 8.5 FL (ref 6–12)
PO2 BLDA: 53.8 MM HG (ref 83–108)
PO2 TEMP ADJ BLD: 53.8 MM HG (ref 83–108)
POTASSIUM SERPL-SCNC: 4.1 MMOL/L (ref 3.5–5.2)
PROCALCITONIN SERPL-MCNC: 0.18 NG/ML (ref 0–0.25)
PROT SERPL-MCNC: 7.9 G/DL (ref 6–8.5)
PROTHROMBIN TIME: 13.5 SECONDS (ref 11.8–14.8)
RBC # BLD AUTO: 5.38 10*6/MM3 (ref 4.14–5.8)
RSV RNA NPH QL NAA+NON-PROBE: NOT DETECTED
SAO2 % BLDCOA: 91.8 % (ref 94–99)
SARS-COV-2 RNA RESP QL NAA+PROBE: NOT DETECTED
SODIUM SERPL-SCNC: 135 MMOL/L (ref 136–145)
TROPONIN T SERPL-MCNC: <0.01 NG/ML (ref 0–0.03)
VENTILATOR MODE: ABNORMAL
WBC NRBC COR # BLD: 18.67 10*3/MM3 (ref 3.4–10.8)
WHOLE BLOOD HOLD COAG: NORMAL
WHOLE BLOOD HOLD SPECIMEN: NORMAL

## 2022-12-26 PROCEDURE — 94799 UNLISTED PULMONARY SVC/PX: CPT

## 2022-12-26 PROCEDURE — 71045 X-RAY EXAM CHEST 1 VIEW: CPT

## 2022-12-26 PROCEDURE — 80053 COMPREHEN METABOLIC PANEL: CPT | Performed by: NURSE PRACTITIONER

## 2022-12-26 PROCEDURE — 85379 FIBRIN DEGRADATION QUANT: CPT | Performed by: NURSE PRACTITIONER

## 2022-12-26 PROCEDURE — 94761 N-INVAS EAR/PLS OXIMETRY MLT: CPT

## 2022-12-26 PROCEDURE — 83605 ASSAY OF LACTIC ACID: CPT | Performed by: NURSE PRACTITIONER

## 2022-12-26 PROCEDURE — 25010000002 AZITHROMYCIN PER 500 MG: Performed by: NURSE PRACTITIONER

## 2022-12-26 PROCEDURE — 94664 DEMO&/EVAL PT USE INHALER: CPT

## 2022-12-26 PROCEDURE — 36600 WITHDRAWAL OF ARTERIAL BLOOD: CPT

## 2022-12-26 PROCEDURE — 87040 BLOOD CULTURE FOR BACTERIA: CPT | Performed by: NURSE PRACTITIONER

## 2022-12-26 PROCEDURE — 85025 COMPLETE CBC W/AUTO DIFF WBC: CPT | Performed by: NURSE PRACTITIONER

## 2022-12-26 PROCEDURE — 25010000002 CEFTRIAXONE PER 250 MG: Performed by: NURSE PRACTITIONER

## 2022-12-26 PROCEDURE — 84145 PROCALCITONIN (PCT): CPT | Performed by: NURSE PRACTITIONER

## 2022-12-26 PROCEDURE — 94640 AIRWAY INHALATION TREATMENT: CPT

## 2022-12-26 PROCEDURE — 85610 PROTHROMBIN TIME: CPT | Performed by: NURSE PRACTITIONER

## 2022-12-26 PROCEDURE — 82803 BLOOD GASES ANY COMBINATION: CPT

## 2022-12-26 PROCEDURE — 99285 EMERGENCY DEPT VISIT HI MDM: CPT

## 2022-12-26 PROCEDURE — 25010000002 METHYLPREDNISOLONE PER 125 MG: Performed by: NURSE PRACTITIONER

## 2022-12-26 PROCEDURE — 84484 ASSAY OF TROPONIN QUANT: CPT | Performed by: NURSE PRACTITIONER

## 2022-12-26 PROCEDURE — 87637 SARSCOV2&INF A&B&RSV AMP PRB: CPT | Performed by: NURSE PRACTITIONER

## 2022-12-26 PROCEDURE — 71275 CT ANGIOGRAPHY CHEST: CPT

## 2022-12-26 PROCEDURE — 83880 ASSAY OF NATRIURETIC PEPTIDE: CPT | Performed by: NURSE PRACTITIONER

## 2022-12-26 PROCEDURE — 93010 ELECTROCARDIOGRAM REPORT: CPT | Performed by: INTERNAL MEDICINE

## 2022-12-26 PROCEDURE — 0 IOPAMIDOL PER 1 ML: Performed by: NURSE PRACTITIONER

## 2022-12-26 PROCEDURE — 25010000002 ENOXAPARIN PER 10 MG: Performed by: FAMILY MEDICINE

## 2022-12-26 PROCEDURE — 93005 ELECTROCARDIOGRAM TRACING: CPT

## 2022-12-26 PROCEDURE — 36415 COLL VENOUS BLD VENIPUNCTURE: CPT

## 2022-12-26 RX ORDER — METHYLPREDNISOLONE SODIUM SUCCINATE 125 MG/2ML
125 INJECTION, POWDER, LYOPHILIZED, FOR SOLUTION INTRAMUSCULAR; INTRAVENOUS ONCE
Status: COMPLETED | OUTPATIENT
Start: 2022-12-26 | End: 2022-12-26

## 2022-12-26 RX ORDER — ENOXAPARIN SODIUM 100 MG/ML
40 INJECTION SUBCUTANEOUS
Status: DISCONTINUED | OUTPATIENT
Start: 2022-12-26 | End: 2022-12-27

## 2022-12-26 RX ORDER — ACETAMINOPHEN 500 MG
1000 TABLET ORAL ONCE
Status: COMPLETED | OUTPATIENT
Start: 2022-12-26 | End: 2022-12-26

## 2022-12-26 RX ORDER — AMLODIPINE BESYLATE 5 MG/1
5 TABLET ORAL
Status: DISCONTINUED | OUTPATIENT
Start: 2022-12-26 | End: 2022-12-27

## 2022-12-26 RX ORDER — ACETAMINOPHEN 325 MG/1
650 TABLET ORAL EVERY 4 HOURS PRN
Status: DISCONTINUED | OUTPATIENT
Start: 2022-12-26 | End: 2022-12-30 | Stop reason: HOSPADM

## 2022-12-26 RX ORDER — IPRATROPIUM BROMIDE AND ALBUTEROL SULFATE 2.5; .5 MG/3ML; MG/3ML
3 SOLUTION RESPIRATORY (INHALATION) ONCE
Status: COMPLETED | OUTPATIENT
Start: 2022-12-26 | End: 2022-12-26

## 2022-12-26 RX ORDER — ONDANSETRON 4 MG/1
4 TABLET, FILM COATED ORAL EVERY 6 HOURS PRN
Status: DISCONTINUED | OUTPATIENT
Start: 2022-12-26 | End: 2022-12-30 | Stop reason: HOSPADM

## 2022-12-26 RX ORDER — LANOLIN ALCOHOL/MO/W.PET/CERES
6 CREAM (GRAM) TOPICAL NIGHTLY PRN
Status: DISCONTINUED | OUTPATIENT
Start: 2022-12-26 | End: 2022-12-30 | Stop reason: HOSPADM

## 2022-12-26 RX ORDER — SODIUM CHLORIDE 0.9 % (FLUSH) 0.9 %
10 SYRINGE (ML) INJECTION EVERY 12 HOURS SCHEDULED
Status: DISCONTINUED | OUTPATIENT
Start: 2022-12-26 | End: 2022-12-30 | Stop reason: HOSPADM

## 2022-12-26 RX ORDER — LORAZEPAM 0.5 MG/1
0.5 TABLET ORAL EVERY 8 HOURS PRN
Status: DISCONTINUED | OUTPATIENT
Start: 2022-12-26 | End: 2022-12-30 | Stop reason: HOSPADM

## 2022-12-26 RX ORDER — IPRATROPIUM BROMIDE AND ALBUTEROL SULFATE 2.5; .5 MG/3ML; MG/3ML
3 SOLUTION RESPIRATORY (INHALATION)
Status: DISCONTINUED | OUTPATIENT
Start: 2022-12-26 | End: 2022-12-30 | Stop reason: HOSPADM

## 2022-12-26 RX ORDER — LISINOPRIL 20 MG/1
40 TABLET ORAL
Status: DISCONTINUED | OUTPATIENT
Start: 2022-12-26 | End: 2022-12-27

## 2022-12-26 RX ORDER — PRAVASTATIN SODIUM 20 MG
40 TABLET ORAL DAILY
Status: DISCONTINUED | OUTPATIENT
Start: 2022-12-26 | End: 2022-12-27

## 2022-12-26 RX ORDER — PANTOPRAZOLE SODIUM 40 MG/1
40 TABLET, DELAYED RELEASE ORAL EVERY MORNING
Status: DISCONTINUED | OUTPATIENT
Start: 2022-12-27 | End: 2022-12-30 | Stop reason: HOSPADM

## 2022-12-26 RX ORDER — SODIUM CHLORIDE 0.9 % (FLUSH) 0.9 %
10 SYRINGE (ML) INJECTION AS NEEDED
Status: DISCONTINUED | OUTPATIENT
Start: 2022-12-26 | End: 2022-12-30 | Stop reason: HOSPADM

## 2022-12-26 RX ORDER — HYDROCODONE BITARTRATE AND ACETAMINOPHEN 5; 325 MG/1; MG/1
1 TABLET ORAL EVERY 4 HOURS PRN
Status: DISCONTINUED | OUTPATIENT
Start: 2022-12-26 | End: 2022-12-30 | Stop reason: HOSPADM

## 2022-12-26 RX ORDER — SODIUM CHLORIDE 9 MG/ML
40 INJECTION, SOLUTION INTRAVENOUS AS NEEDED
Status: DISCONTINUED | OUTPATIENT
Start: 2022-12-26 | End: 2022-12-30 | Stop reason: HOSPADM

## 2022-12-26 RX ADMIN — PRAVASTATIN SODIUM 40 MG: 20 TABLET ORAL at 20:00

## 2022-12-26 RX ADMIN — ACETAMINOPHEN 1000 MG: 500 TABLET ORAL at 13:29

## 2022-12-26 RX ADMIN — IPRATROPIUM BROMIDE AND ALBUTEROL SULFATE 3 ML: 2.5; .5 SOLUTION RESPIRATORY (INHALATION) at 11:58

## 2022-12-26 RX ADMIN — Medication 10 ML: at 20:00

## 2022-12-26 RX ADMIN — ENOXAPARIN SODIUM 40 MG: 100 INJECTION SUBCUTANEOUS at 20:00

## 2022-12-26 RX ADMIN — IOPAMIDOL 83 ML: 755 INJECTION, SOLUTION INTRAVENOUS at 15:04

## 2022-12-26 RX ADMIN — IPRATROPIUM BROMIDE AND ALBUTEROL SULFATE 3 ML: .5; 3 SOLUTION RESPIRATORY (INHALATION) at 20:39

## 2022-12-26 RX ADMIN — CEFTRIAXONE 1 G: 1 INJECTION, POWDER, FOR SOLUTION INTRAMUSCULAR; INTRAVENOUS at 16:01

## 2022-12-26 RX ADMIN — METHYLPREDNISOLONE SODIUM SUCCINATE 125 MG: 125 INJECTION, POWDER, FOR SOLUTION INTRAMUSCULAR; INTRAVENOUS at 12:08

## 2022-12-26 RX ADMIN — SODIUM CHLORIDE, POTASSIUM CHLORIDE, SODIUM LACTATE AND CALCIUM CHLORIDE 1000 ML: 600; 310; 30; 20 INJECTION, SOLUTION INTRAVENOUS at 12:07

## 2022-12-26 RX ADMIN — AZITHROMYCIN DIHYDRATE 500 MG: 500 INJECTION, POWDER, LYOPHILIZED, FOR SOLUTION INTRAVENOUS at 16:22

## 2022-12-26 NOTE — ED PROVIDER NOTES
Subjective   History of Present Illness  Patient is a 42-year-old white male presents emergency department with cough, fever, shortness of breath that has had for the past 2 weeks.  His wife states that he started wheezing over the past couple days and the cough has gotten much worse.  She states actually the fever did started 2 days ago but he has had the cough for 2 weeks.  He states he tried to go into work today became very short of breath and started coughing and wheezing while at work.  He had a temperature of 101.9.  He has received the COVID-19 vaccination however he has not received the influenza vaccine.  He does have a history of hyperlipidemia hypertension, and GERD.  He has a past smoker and stopped smoking about 20 years ago.  He denies any chest pain.    History provided by:  Patient   used: No        Review of Systems   Constitutional: Negative.    HENT: Negative.    Eyes: Negative.    Respiratory:        Patient is a 42-year-old white male presents emergency department with cough, fever, shortness of breath that has had for the past 2 weeks.  His wife states that he started wheezing over the past couple days and the cough has gotten much worse.  She states actually the fever did started 2 days ago but he has had the cough for 2 weeks.  He states he tried to go into work today became very short of breath and started coughing and wheezing while at work.  He had a temperature of 101.9.  He has received the COVID-19 vaccination however he has not received the influenza vaccine.  He does have a history of hyperlipidemia hypertension, and GERD.  He has a past smoker and stopped smoking about 20 years ago.  He denies any chest pain.     Cardiovascular: Negative.    Gastrointestinal: Negative.    Endocrine: Negative.    Genitourinary: Negative.    Musculoskeletal: Negative.    Skin: Negative.    Allergic/Immunologic: Negative.    Neurological: Negative.    Hematological: Negative.   "  Psychiatric/Behavioral: Negative.    All other systems reviewed and are negative.      Past Medical History:   Diagnosis Date   • GERD (gastroesophageal reflux disease)    • Hyperlipidemia    • Hypertension        No Known Allergies    Past Surgical History:   Procedure Laterality Date   • ADENOIDECTOMY     • CHOLECYSTECTOMY     • COLONOSCOPY N/A 2/12/2020    Procedure: COLONOSCOPY WITH ANESTHESIA;  Surgeon: Souleymane Kinsey MD;  Location: [facility] ENDOSCOPY;  Service: Gastroenterology;  Laterality: N/A;  Pre:Family Hx Colon Polyps  Post: Colon Polyps  Dr. Jean Baptiste  CO2 Inflation Used   • TONSILLECTOMY     • TYMPANOSTOMY TUBE PLACEMENT         Family History   Problem Relation Age of Onset   • Colon cancer Father    • Colon polyps Neg Hx        Social History     Socioeconomic History   • Marital status:    Tobacco Use   • Smoking status: Former     Types: Cigarettes   • Smokeless tobacco: Never   Vaping Use   • Vaping Use: Never used   Substance and Sexual Activity   • Alcohol use: Yes     Comment: beer every weekend   • Drug use: No   • Sexual activity: Defer       Prior to Admission medications    Medication Sig Start Date End Date Taking? Authorizing Provider   amLODIPine-benazepril (LOTREL) 5-40 MG per capsule Take 1 capsule by mouth Daily.    ProviderSamuel MD   ipratropium-albuterol (DUO-NEB) 0.5-2.5 mg/mL nebulizer Take 3 mL by nebulization 3 (Three) Times a Day. 4/26/17   Kedar Fay PA   losartan (COZAAR) 25 MG tablet Take 25 mg by mouth Daily.    ProviderSamuel MD   omeprazole (priLOSEC) 20 MG capsule Take 20 mg by mouth Daily.    Samuel Riley MD   pravastatin (PRAVACHOL) 40 MG tablet Take 40 mg by mouth Daily.    Samuel Riley MD       /84 (BP Location: Right arm, Patient Position: Sitting)   Pulse 102   Temp 97.8 °F (36.6 °C) (Oral)   Resp 19   Ht 177.8 cm (70\")   Wt (!) 161 kg (356 lb)   SpO2 93%   BMI 51.08 kg/m²     Objective   Physical " Exam  Vitals and nursing note reviewed.   Constitutional:       Appearance: He is well-developed.      Comments: Nontoxic-appearing.  Patient is persistently coughing throughout exam.  He is audibly wheezing.   HENT:      Head: Normocephalic and atraumatic.   Eyes:      Conjunctiva/sclera: Conjunctivae normal.      Pupils: Pupils are equal, round, and reactive to light.   Cardiovascular:      Rate and Rhythm: Regular rhythm. Tachycardia present.      Heart sounds: Normal heart sounds.      Comments: Sinus tachycardia 130s  Pulmonary:      Effort: Pulmonary effort is normal.      Breath sounds: Wheezing present.      Comments: Expiratory wheezing noted throughout.  Persistent dry cough noted throughout exam.  Abdominal:      General: Bowel sounds are normal.      Palpations: Abdomen is soft.   Musculoskeletal:         General: Normal range of motion.      Cervical back: Normal range of motion and neck supple.   Skin:     General: Skin is warm and dry.   Neurological:      Mental Status: He is alert and oriented to person, place, and time.      Deep Tendon Reflexes: Reflexes are normal and symmetric.   Psychiatric:         Behavior: Behavior normal.         Thought Content: Thought content normal.         Judgment: Judgment normal.         Procedures         Lab Results (last 24 hours)     Procedure Component Value Units Date/Time    Blood Gas, Arterial - [[id number]]  (Abnormal) Collected: 12/26/22 1130    Specimen: Arterial Blood Updated: 12/26/22 1153     Site Right Radial     Bill's Test Positive     pH, Arterial 7.506 pH units      Comment: 83 Value above reference range        pCO2, Arterial 34.6 mm Hg      Comment: 84 Value below reference range        pO2, Arterial 53.8 mm Hg      Comment: 85 Value below critical limit        HCO3, Arterial 27.4 mmol/L      Comment: 83 Value above reference range        Base Excess, Arterial 4.5 mmol/L      Comment: 83 Value above reference range        O2 Saturation, Arterial  91.8 %      Comment: 84 Value below reference range        Temperature 37.0 C      Barometric Pressure for Blood Gas 754 mmHg      Modality Room Air     FIO2 21 %      Ventilator Mode NA     Notified Who DR SAUCEDA     [facility]     Notified Time 12/26/2022 11:58     [facility]     Comment: Meter: [id number]     :  HCASE1        pCO2, Temperature Corrected 34.6 mm Hg      pH, Temp Corrected 7.506 pH Units      pO2, Temperature Corrected 53.8 mm Hg     CBC & Differential [[id number]]  (Abnormal) Collected: 12/26/22 1145    Specimen: Blood Updated: 12/26/22 1223    Narrative:      The following orders were created for panel order CBC & Differential.  Procedure                               Abnormality         Status                     ---------                               -----------         ------                     CBC Auto Differential[[id number]]        Abnormal            Final result                 Please view results for these tests on the individual orders.    Comprehensive Metabolic Panel [[id number]]  (Abnormal) Collected: 12/26/22 1145    Specimen: Blood Updated: 12/26/22 1239     Glucose 147 mg/dL      BUN 11 mg/dL      Creatinine 0.73 mg/dL      Sodium 135 mmol/L      Potassium 4.1 mmol/L      Chloride 97 mmol/L      CO2 27.0 mmol/L      Calcium 9.1 mg/dL      Total Protein 7.9 g/dL      Albumin 4.40 g/dL      ALT (SGPT) 32 U/L      AST (SGOT) 18 U/L      Alkaline Phosphatase 73 U/L      Total Bilirubin 1.3 mg/dL      Globulin 3.5 gm/dL      A/G Ratio 1.3 g/dL      BUN/Creatinine Ratio 15.1     Anion Gap 11.0 mmol/L      eGFR 115.1 mL/min/1.73      Comment: National Kidney Foundation and American Society of Nephrology (ASN) Task Force recommended calculation based on the Chronic Kidney Disease Epidemiology Collaboration (CKD-EPI) equation refit without adjustment for race.       Narrative:      GFR Normal >60  Chronic Kidney Disease <60  Kidney Failure <15       "Protime-INR [[id number]]  (Normal) Collected: 12/26/22 1145    Specimen: Blood Updated: 12/26/22 1230     Protime 13.5 Seconds      INR 1.02    Troponin [[id number]]  (Normal) Collected: 12/26/22 1145    Specimen: Blood Updated: 12/26/22 1303     Troponin T <0.010 ng/mL     Narrative:      Troponin T Reference Range:  <= 0.03 ng/mL-   Negative for AMI  >0.03 ng/mL-     Abnormal for myocardial necrosis.  Clinicians would have to utilize clinical acumen, EKG, Troponin and serial changes to determine if it is an Acute Myocardial Infarction or myocardial injury due to an underlying chronic condition.       Results may be falsely decreased if patient taking Biotin.      Procalcitonin [[id number]]  (Normal) Collected: 12/26/22 1145    Specimen: Blood Updated: 12/26/22 1249     Procalcitonin 0.18 ng/mL     Narrative:      As a Marker for Sepsis (Non-Neonates):    1. <0.5 ng/mL represents a low risk of severe sepsis and/or septic shock.  2. >2 ng/mL represents a high risk of severe sepsis and/or septic shock.    As a Marker for Lower Respiratory Tract Infections that require antibiotic therapy:    PCT on Admission    Antibiotic Therapy       6-12 Hrs later    >0.5                Strongly Recommended  >0.25 - <0.5        Recommended   0.1 - 0.25          Discouraged              Remeasure/reassess PCT  <0.1                Strongly Discouraged     Remeasure/reassess PCT    As 28 day mortality risk marker: \"Change in Procalcitonin Result\" (>80% or <=80%) if Day 0 (or Day 1) and Day 4 values are available. Refer to http://www.Arm[employer]-pct-calculator.com    Change in PCT <=80%  A decrease of PCT levels below or equal to 80% defines a positive change in PCT test result representing a higher risk for 28-day all-cause mortality of patients diagnosed with severe sepsis for septic shock.    Change in PCT >80%  A decrease of PCT levels of more than 80% defines a negative change in PCT result representing a lower risk for 28-day all-cause " mortality of patients diagnosed with severe sepsis or septic shock.       Lactic Acid, Plasma [[id number]]  (Abnormal) Collected: 12/26/22 1145    Specimen: Blood Updated: 12/26/22 1319     Lactate 2.4 mmol/L     Blood Culture - Blood, Arm, Left [[id number]] Collected: 12/26/22 1145    Specimen: Blood from Arm, Left Updated: 12/26/22 1209    BNP [[id number]]  (Normal) Collected: 12/26/22 1145    Specimen: Blood Updated: 12/26/22 1240     proBNP 39.1 pg/mL     Narrative:      Among patients with dyspnea, NT-proBNP is highly sensitive for the detection of acute congestive heart failure. In addition NT-proBNP of <300 pg/ml effectively rules out acute congestive heart failure with 99% negative predictive value.      CBC Auto Differential [[id number]]  (Abnormal) Collected: 12/26/22 1145    Specimen: Blood Updated: 12/26/22 1223     WBC 18.67 10*3/mm3      RBC 5.38 10*6/mm3      Hemoglobin 15.0 g/dL      Hematocrit 46.7 %      MCV 86.8 fL      MCH 27.9 pg      MCHC 32.1 g/dL      RDW 14.8 %      RDW-SD 46.9 fl      MPV 8.5 fL      Platelets 296 10*3/mm3      Neutrophil % 85.8 %      Lymphocyte % 4.1 %      Monocyte % 8.2 %      Eosinophil % 0.4 %      Basophil % 0.5 %      Immature Grans % 1.0 %      Neutrophils, Absolute 16.03 10*3/mm3      Lymphocytes, Absolute 0.76 10*3/mm3      Monocytes, Absolute 1.53 10*3/mm3      Eosinophils, Absolute 0.07 10*3/mm3      Basophils, Absolute 0.09 10*3/mm3      Immature Grans, Absolute 0.19 10*3/mm3      nRBC 0.0 /100 WBC     D-dimer, Quantitative [[id number]]  (Normal) Collected: 12/26/22 1145    Specimen: Blood Updated: 12/26/22 1230     D-Dimer, Quantitative 0.41 MCGFEU/mL     Narrative:      According to the assay 's published package insert, a normal (<0.50 MCGFEU/mL) D-dimer result in conjunction with a non-high clinical probability assessment, excludes deep vein thrombosis (DVT) and pulmonary embolism (PE) with high sensitivity.    D-dimer values increase with age and  "this can make VTE exclusion of an older population difficult. To address this, the American College of Physicians, based on best available evidence and recent guidelines, recommends that clinicians use age-adjusted D-dimer thresholds in patients greater than 50 years of age with: a) a low probability of PE who do not meet all Pulmonary Embolism Rule Out Criteria, or b) in those with intermediate probability of PE.   The formula for an age-adjusted D-dimer cut-off is \"age/100\".  For example, a 60 year old patient would have an age-adjusted cut-off of 0.60 MCGFEU/mL and an 80 year old 0.80 MCGFEU/mL.    [employer] Blood Culture Bottle Set [[id number]] Collected: 12/26/22 1150    Specimen: Blood from Arm, Right Updated: 12/26/22 1300     Extra Tube Hold for add-ons.     Comment: Auto resulted.       Blood Culture - Blood, Arm, Right [[id number]] Collected: 12/26/22 1150    Specimen: Blood from Arm, Right Updated: 12/26/22 1209    COVID PRE-OP / PRE-PROCEDURE SCREENING ORDER (NO ISOLATION) - Swab, Nasopharynx [[id number]]  (Normal) Collected: 12/26/22 1209    Specimen: Swab from Nasopharynx Updated: 12/26/22 1333    Narrative:      The following orders were created for panel order COVID PRE-OP / PRE-PROCEDURE SCREENING ORDER (NO ISOLATION) - Swab, Nasopharynx.  Procedure                               Abnormality         Status                     ---------                               -----------         ------                     COVID-19, FLU A/B, RSV P...[[id number]]  Normal              Final result                 Please view results for these tests on the individual orders.    COVID-19, FLU A/B, RSV PCR - Swab, Nasopharynx [[id number]]  (Normal) Collected: 12/26/22 1209    Specimen: Swab from Nasopharynx Updated: 12/26/22 1333     COVID19 Not Detected     Influenza A PCR Not Detected     Influenza B PCR Not Detected     RSV, PCR Not Detected    Narrative:      Fact sheet for providers: " https://www.fda.gov/media/412935/download    Fact sheet for patients: https://www.fda.gov/media/014862/download    Test performed by PCR.    STAT Lactic Acid, Reflex [[id number]]  (Normal) Collected: 12/26/22 1514    Specimen: Blood Updated: 12/26/22 1554     Lactate 1.8 mmol/L           CT Angiogram Chest   Final Result      XR Chest 1 View   Final Result   1. Hypoventilated lungs with basilar densities favoring vascular   crowding and atelectasis. Basilar pneumonitis considered. No   radiographic evidence of edema.   This report was finalized on 12/26/2022 12:27 by Dr. Ashwini Moser MD.          ED Course  ED Course as of 12/26/22 1851   Mon Dec 26, 2022   1334 Pt dimer is negative. His cxr reveals possible pnueumonitits. His 02 sat dropped to 88% on 2L. Feel that the pt needs cta chest to r/o pulmonary embolus.  [CW]   1554 Cta chest - no pulmonary embolus noted. RLL pneumonia. Pt has had blood cultures ordered. His abg- pH 7.506; pCO2 34.6; p02 53.8, wbc 18.67; covid 19; influenza a and influenza b are negative. Have reviewed results of testing with pt and pt spouse.. have placed call to Dr. Jean Baptiste at this time for further. Pt 02 sat on 2L 94 %.  [CW]   1600 Spoke with Dr Wayne - on call for Dr. Jean Baptiste- has accepted for admission. Will be admitted shortly in stable condition.  [CW]      ED Course User Index  [CW] Robina Hood, APRN          MDM  Number of Diagnoses or Management Options  Hypoxia: new and requires workup  Pneumonia due to infectious organism, unspecified laterality, unspecified part of lung: new and requires workup     Amount and/or Complexity of Data Reviewed  Clinical lab tests: ordered and reviewed  Tests in the radiology section of CPT®: ordered and reviewed    Patient Progress  Patient progress: stable      Final diagnoses:   Pneumonia due to infectious organism, unspecified laterality, unspecified part of lung   Hypoxia          Robina Hood, APRN  12/26/22 1851

## 2022-12-26 NOTE — ED TRIAGE NOTES
Patient presents for shortness of breath and fever. Patient states he started feeling bad on Saturday, with generalized weakness, fever, cough, shortness of breath and wheezing after they went to a joslyn party. Patient states he aspirated a few years ago and has had respiratory problems since then. Wife states she woke up this morning and took patient axillary temperature and it was 101.2, patient got ibuprofen @ 0500. Patient o2 sat was 88% on room air while getting from wheelchair to stretcher. Patient states he sleeps with CPAP but no home o2. He did smoke but stopped in 2000.

## 2022-12-27 PROBLEM — I10 HYPERTENSION: Status: ACTIVE | Noted: 2022-12-27

## 2022-12-27 LAB
ALBUMIN SERPL-MCNC: 4.3 G/DL (ref 3.5–5.2)
ALBUMIN/GLOB SERPL: 1.3 G/DL
ALP SERPL-CCNC: 74 U/L (ref 39–117)
ALT SERPL W P-5'-P-CCNC: 29 U/L (ref 1–41)
ANION GAP SERPL CALCULATED.3IONS-SCNC: 8 MMOL/L (ref 5–15)
AST SERPL-CCNC: 17 U/L (ref 1–40)
BASOPHILS # BLD AUTO: 0.04 10*3/MM3 (ref 0–0.2)
BASOPHILS NFR BLD AUTO: 0.2 % (ref 0–1.5)
BILIRUB SERPL-MCNC: 0.5 MG/DL (ref 0–1.2)
BUN SERPL-MCNC: 14 MG/DL (ref 6–20)
BUN/CREAT SERPL: 20 (ref 7–25)
CALCIUM SPEC-SCNC: 9.2 MG/DL (ref 8.6–10.5)
CHLORIDE SERPL-SCNC: 99 MMOL/L (ref 98–107)
CO2 SERPL-SCNC: 30 MMOL/L (ref 22–29)
CREAT SERPL-MCNC: 0.7 MG/DL (ref 0.76–1.27)
DEPRECATED RDW RBC AUTO: 47.6 FL (ref 37–54)
EGFRCR SERPLBLD CKD-EPI 2021: 116.5 ML/MIN/1.73
EOSINOPHIL # BLD AUTO: 0.03 10*3/MM3 (ref 0–0.4)
EOSINOPHIL NFR BLD AUTO: 0.2 % (ref 0.3–6.2)
ERYTHROCYTE [DISTWIDTH] IN BLOOD BY AUTOMATED COUNT: 14.7 % (ref 12.3–15.4)
GLOBULIN UR ELPH-MCNC: 3.2 GM/DL
GLUCOSE SERPL-MCNC: 226 MG/DL (ref 65–99)
HCT VFR BLD AUTO: 45.7 % (ref 37.5–51)
HGB BLD-MCNC: 14.7 G/DL (ref 13–17.7)
IMM GRANULOCYTES # BLD AUTO: 0.18 10*3/MM3 (ref 0–0.05)
IMM GRANULOCYTES NFR BLD AUTO: 0.9 % (ref 0–0.5)
LYMPHOCYTES # BLD AUTO: 0.82 10*3/MM3 (ref 0.7–3.1)
LYMPHOCYTES NFR BLD AUTO: 4.1 % (ref 19.6–45.3)
MCH RBC QN AUTO: 28.2 PG (ref 26.6–33)
MCHC RBC AUTO-ENTMCNC: 32.2 G/DL (ref 31.5–35.7)
MCV RBC AUTO: 87.7 FL (ref 79–97)
MONOCYTES # BLD AUTO: 1.02 10*3/MM3 (ref 0.1–0.9)
MONOCYTES NFR BLD AUTO: 5.1 % (ref 5–12)
NEUTROPHILS NFR BLD AUTO: 17.77 10*3/MM3 (ref 1.7–7)
NEUTROPHILS NFR BLD AUTO: 89.5 % (ref 42.7–76)
NRBC BLD AUTO-RTO: 0 /100 WBC (ref 0–0.2)
PLATELET # BLD AUTO: 309 10*3/MM3 (ref 140–450)
PMV BLD AUTO: 8.7 FL (ref 6–12)
POTASSIUM SERPL-SCNC: 3.8 MMOL/L (ref 3.5–5.2)
PROT SERPL-MCNC: 7.5 G/DL (ref 6–8.5)
QT INTERVAL: 280 MS
QTC INTERVAL: 413 MS
RBC # BLD AUTO: 5.21 10*6/MM3 (ref 4.14–5.8)
SODIUM SERPL-SCNC: 137 MMOL/L (ref 136–145)
WBC NRBC COR # BLD: 19.86 10*3/MM3 (ref 3.4–10.8)

## 2022-12-27 PROCEDURE — 94799 UNLISTED PULMONARY SVC/PX: CPT

## 2022-12-27 PROCEDURE — 85025 COMPLETE CBC W/AUTO DIFF WBC: CPT | Performed by: FAMILY MEDICINE

## 2022-12-27 PROCEDURE — 25010000002 CEFTRIAXONE PER 250 MG: Performed by: FAMILY MEDICINE

## 2022-12-27 PROCEDURE — 25010000002 ENOXAPARIN PER 10 MG: Performed by: FAMILY MEDICINE

## 2022-12-27 PROCEDURE — 94664 DEMO&/EVAL PT USE INHALER: CPT

## 2022-12-27 PROCEDURE — 80053 COMPREHEN METABOLIC PANEL: CPT | Performed by: FAMILY MEDICINE

## 2022-12-27 PROCEDURE — 25010000002 AZITHROMYCIN PER 500 MG: Performed by: FAMILY MEDICINE

## 2022-12-27 RX ORDER — PRAVASTATIN SODIUM 20 MG
40 TABLET ORAL NIGHTLY
Status: DISCONTINUED | OUTPATIENT
Start: 2022-12-27 | End: 2022-12-30 | Stop reason: HOSPADM

## 2022-12-27 RX ORDER — LOSARTAN POTASSIUM 50 MG/1
25 TABLET ORAL
Status: DISCONTINUED | OUTPATIENT
Start: 2022-12-27 | End: 2022-12-27

## 2022-12-27 RX ORDER — LOSARTAN POTASSIUM 50 MG/1
25 TABLET ORAL NIGHTLY
Status: DISCONTINUED | OUTPATIENT
Start: 2022-12-27 | End: 2022-12-28

## 2022-12-27 RX ORDER — ENOXAPARIN SODIUM 100 MG/ML
40 INJECTION SUBCUTANEOUS EVERY 12 HOURS SCHEDULED
Status: DISCONTINUED | OUTPATIENT
Start: 2022-12-27 | End: 2022-12-30 | Stop reason: HOSPADM

## 2022-12-27 RX ADMIN — IPRATROPIUM BROMIDE AND ALBUTEROL SULFATE 3 ML: .5; 3 SOLUTION RESPIRATORY (INHALATION) at 06:14

## 2022-12-27 RX ADMIN — PRAVASTATIN SODIUM 40 MG: 20 TABLET ORAL at 20:43

## 2022-12-27 RX ADMIN — ENOXAPARIN SODIUM 40 MG: 100 INJECTION SUBCUTANEOUS at 17:57

## 2022-12-27 RX ADMIN — CEFTRIAXONE 1 G: 1 INJECTION, POWDER, FOR SOLUTION INTRAMUSCULAR; INTRAVENOUS at 15:36

## 2022-12-27 RX ADMIN — PANTOPRAZOLE SODIUM 40 MG: 40 TABLET, DELAYED RELEASE ORAL at 05:54

## 2022-12-27 RX ADMIN — Medication 10 ML: at 10:13

## 2022-12-27 RX ADMIN — IPRATROPIUM BROMIDE AND ALBUTEROL SULFATE 3 ML: .5; 3 SOLUTION RESPIRATORY (INHALATION) at 20:28

## 2022-12-27 RX ADMIN — Medication 10 ML: at 20:44

## 2022-12-27 RX ADMIN — AZITHROMYCIN DIHYDRATE 500 MG: 500 INJECTION, POWDER, LYOPHILIZED, FOR SOLUTION INTRAVENOUS at 16:33

## 2022-12-27 RX ADMIN — IPRATROPIUM BROMIDE AND ALBUTEROL SULFATE 3 ML: .5; 3 SOLUTION RESPIRATORY (INHALATION) at 14:48

## 2022-12-27 NOTE — PLAN OF CARE
"Goal Outcome Evaluation:  Plan of Care Reviewed With: patient        Progress: no change  Outcome Evaluation: Pt awake and alert, sitting on side of bed. Pt denies pain. Pt reports several day history of \"feeling bad\" today he came to the ER for increased SOA and fever. Pt was found to have on o2 sat 88 percent on RA. PT currently on 2 L NC with oxygen saturations of 90-92 percent. Safety Maintained.  "

## 2022-12-27 NOTE — PROGRESS NOTES
"Pharmacy Dosing Service  Anticoagulant  Enoxaparin    Assessment/Action/Plan:  Body mass index is 51.08 kg/m². (!) 161 kg (356 lb)  Enoxaparin dose adjusted to 40 mg sq every 12h for VTE prophylaxis. BMI > 40.     Subjective:  Conner Almonte is a 42 y.o. male on Enoxaparin 40 mg SQ every 12 hours for indication of VTE prophylaxis.  Objective:  [Ht: 177.8 cm (70\"); Wt: (!) 161 kg (356 lb); BMI: Body mass index is 51.08 kg/m².]  Estimated Creatinine Clearance: 205.7 mL/min (A) (by C-G formula based on SCr of 0.7 mg/dL (L)). No results found for: DDIMER   Lab Results   Component Value Date    INR 1.02 12/26/2022    PROTIME 13.5 12/26/2022      Lab Results   Component Value Date    HGB 14.7 12/27/2022    HGB 15.0 12/26/2022    HGB 13.8 (L) 04/23/2017      Lab Results   Component Value Date     12/27/2022     12/26/2022     04/23/2017       Kedar Bull, PharmD  12/27/22 17:31 CST     "

## 2022-12-27 NOTE — PAYOR COMM NOTE
"12/27/22 [facility] [phone]  [phone]    ER ADMIT TO INPATIENT ON 12/26/22. PENDING INPATIENT AUTH    # [id number]            Conner Jose (42 y.o. Male)     Date of Birth   1980    Social Security Number       Address   [address] [place] [address]    Home Phone   [phone]    MRN   [id number]       Judaism   Anabaptism    Marital Status                               Admission Date   12/26/22    Admission Type   Emergency    Admitting Provider       Attending Provider   Elvin Scott MD    Department, Room/Bed   [address], 376/1       Discharge Date       Discharge Disposition       Discharge Destination                               Attending Provider: Elvin Scott MD    Allergies: No Known Allergies    Isolation: None   Infection: None   Code Status: CPR    Ht: 177.8 cm (70\")   Wt: 161 kg (356 lb)    Admission Cmt: None   Principal Problem: Pneumonia due to infectious organism, unspecified laterality, unspecified part of lung [J18.9]                 Active Insurance as of 12/26/2022     Primary Coverage     Payor Plan Insurance Group Employer/Plan Group    ANTHEM BLUE CROSS ANTHEM BLUE CROSS BLUE [employer] PPO [id number]     Payor Plan Address Payor Plan Phone Number Payor Plan Fax Number Effective Dates    [address] [phone]  8/1/2011 - None Entered    [place] [address]       Subscriber Name Subscriber Birth Date Member ID       CONNER JOSE 1980 [id number]                 Emergency Contacts      (Rel.) Home Phone Work Phone Mobile Phone    Leigh Jose (Spouse) [phone] -- --           [facility] Encounter Date/Time: 12/26/2022 1133   Hospital Account: [id number]    MRN: [id number]   Patient:  Conner Jose   Contact Serial #: [id number]   SSN:          ENCOUNTER             Patient Class: Inpatient   Unit: [address] Service: Medicine     " Bed: 376/1   Admitting Provider:     Referring Physician: Provider, No Known   Attending Provider: Elvin Scott MD   Adm Diagnosis: Pneumonia due to infecti*               PATIENT          Name: Conner Jose : 1980 (42 yrs)   Address: [address] Sex: Male   City: [place] [address]   County: [place]   Marital Status:  Ethnicity: NOT                                                                              Race: WHITE   Primary Care Provider: Donovan Jean Baptiste MD Patients Phone: Home Phone: [phone]     Mobile Phone: [phone]   EMERGENCY CONTACT   Contact Name Legal Guardian? Relationship to Patient Home Phone Work Phone   1. SuzyLeigh  2. *No Contact Specified*      Spouse    [phone]              GUARANTOR            Guarantor: Conner Jose     : 1980   Address: [address] Sex: Male     [place] [address]     Relation to Patient: Self       Home Phone: [phone]   Guarantor ID: [id number]       Work Phone:     GUARANTOR EMPLOYER   Employer: [employer]         Status: FULL TIME   COVERAGE          PRIMARY INSURANCE   Payor: [employer] Plan: ANTHEM BLUE CROSS BLUE [facility] PPO   Group Number: [id number] Insurance Type: INDEMNITY   Subscriber Name: CONNER JOSE Subscriber : 1980   Subscriber ID: [id number] Coverage Address: [facility] [id number] [place] [address]   Pat. Rel. to Subscriber: Self Coverage Phone: [phone]   SECONDARY INSURANCE   Payor: N/A Plan: N/A   Group Number:   Insurance Type:     Subscriber Name:   Subscriber :     Subscriber ID:   Coverage Address:     Pat. Rel. to Subscriber:   Coverage Phone:        Contact Serial # ([id number])       2022    Chart ID [id number])            Robina Hood APRN   Nurse Practitioner  Emergency Medicine  ED Provider Notes     Attested  Date of Service:  22 1135  Creation Time:  22 1147     Attested             Attestation signed by Rachid Armendariz Jr., MD at 12/27/22 8297           SUPERVISE: For this patient encounter, I reviewed the APC's documentation, treatment plan, and medical decision making.  Rachid Armendariz Jr, MD 12/27/2022 06:29 CST                                   Expand AllCollapse All                                                                                                                                                                                                                                                                                                                                                                                                                                                   Subjective      History of Present Illness  Patient is a 42-year-old white male presents emergency department with cough, fever, shortness of breath that has had for the past 2 weeks.  His wife states that he started wheezing over the past couple days and the cough has gotten much worse.  She states actually the fever did started 2 days ago but he has had the cough for 2 weeks.  He states he tried to go into work today became very short of breath and started coughing and wheezing while at work.  He had a temperature of 101.9.  He has received the COVID-19 vaccination however he has not received the influenza vaccine.  He does have a history of hyperlipidemia hypertension, and GERD.  He has a past smoker and stopped smoking about 20 years ago.  He denies any chest pain.     History provided by:  Patient   used: No          Review of Systems   Constitutional: Negative.    HENT: Negative.    Eyes: Negative.    Respiratory:        Patient is a 42-year-old white male presents emergency department with cough, fever, shortness of breath that has had for the past 2 weeks.  His wife states that he started wheezing over the past couple days and the cough has gotten much worse.  She states  actually the fever did started 2 days ago but he has had the cough for 2 weeks.  He states he tried to go into work today became very short of breath and started coughing and wheezing while at work.  He had a temperature of 101.9.  He has received the COVID-19 vaccination however he has not received the influenza vaccine.  He does have a history of hyperlipidemia hypertension, and GERD.  He has a past smoker and stopped smoking about 20 years ago.  He denies any chest pain.     Cardiovascular: Negative.    Gastrointestinal: Negative.    Endocrine: Negative.    Genitourinary: Negative.    Musculoskeletal: Negative.    Skin: Negative.    Allergic/Immunologic: Negative.    Neurological: Negative.    Hematological: Negative.    Psychiatric/Behavioral: Negative.    All other systems reviewed and are negative.        Medical History        Past Medical History:   Diagnosis Date   • GERD (gastroesophageal reflux disease)     • Hyperlipidemia     • Hypertension              No Known Allergies     Surgical History         Past Surgical History:   Procedure Laterality Date   • ADENOIDECTOMY       • CHOLECYSTECTOMY       • COLONOSCOPY N/A 2/12/2020     Procedure: COLONOSCOPY WITH ANESTHESIA;  Surgeon: Souleymane Kinsey MD;  Location: [facility] ENDOSCOPY;  Service: Gastroenterology;  Laterality: N/A;  Pre:Family Hx Colon Polyps  Post: Colon Polyps  Dr. Jean Baptiste  CO2 Inflation Used   • TONSILLECTOMY       • TYMPANOSTOMY TUBE PLACEMENT                      Family History   Problem Relation Age of Onset   • Colon cancer Father     • Colon polyps Neg Hx           Social History   Social History            Socioeconomic History   • Marital status:    Tobacco Use   • Smoking status: Former       Types: Cigarettes   • Smokeless tobacco: Never   Vaping Use   • Vaping Use: Never used   Substance and Sexual Activity   • Alcohol use: Yes       Comment: beer every weekend   • Drug use: No   • Sexual activity: Defer                                     Lab Results (last 24 hours)      Procedure Component Value Units Date/Time     Blood Gas, Arterial - [[id number]]  (Abnormal) Collected: 12/26/22 1130     Specimen: Arterial Blood Updated: 12/26/22 1153       Site Right Radial       Bill's Test Positive       pH, Arterial 7.506 pH units         Comment: 83 Value above reference range          pCO2, Arterial 34.6 mm Hg         Comment: 84 Value below reference range          pO2, Arterial 53.8 mm Hg         Comment: 85 Value below critical limit          HCO3, Arterial 27.4 mmol/L         Comment: 83 Value above reference range          Base Excess, Arterial 4.5 mmol/L         Comment: 83 Value above reference range          O2 Saturation, Arterial 91.8 %         Comment: 84 Value below reference range          Temperature 37.0 C         Barometric Pressure for Blood Gas 754 mmHg         Modality Room Air       FIO2 21 %         Ventilator Mode NA       Notified Who DR SAUCEDA       [email or web address]       Notified Time 12/26/2022 11:58       Col[email or web address]       Comment: Meter: [id number]     :  HCASE1          pCO2, Temperature Corrected 34.6 mm Hg         pH, Temp Corrected 7.506 pH Units         pO2, Temperature Corrected 53.8       Metabolic Panel [[id number]]  (Abnormal) Collected: 12/26/22 1145      Specimen: Blood Updated: 12/26/22 1239       Glucose 147 mg/dL         BUN 11 mg/dL         Creatinine 0.73 mg/dL         Sodium 135 mmol/L         Potassium 4.1 mmol/L         Chloride 97 mmol/L         CO2 27.0 mmol/L         Calcium 9.1 mg/dL         Total Protein 7.9 g/dL         Albumin 4.40 g/dL         ALT (SGPT) 32 U/L         AST (SGOT) 18 U/L         Alkaline Phosphatase 73 U/L         Total Bilirubin 1.3 mg/dL         Globulin 3.5 gm/dL         A/G Ratio 1.3 g/dL         BUN/Creatinine Ratio 15.1       Anion Gap 11.0 mmol/L         eGFR 115.1       Plasma [[id number]]  (Abnormal) Collected: 12/26/22 1145      Specimen:  Blood Updated: 12/26/22 1319       Lactate 2.4 mmol/L       Blood Culture - Blood, Arm, Left [[id number]] Collected: 12/26/22 1145     Specimen: Blood from Arm, Left Updated: 12/26/22 1209     BNP [[id number]]  (Normal) Collected: 12/26/22 1145     Specimen: Blood Updated: 12/26/22 1240       proBNP 39.1 pg/mL       Narrative:       Among patients with dyspnea, NT-proBNP is highly sensitive for the detection of acute congestive heart failure. In addition NT-proBNP of <300 pg/ml effectively rules out acute congestive heart failure with 99% negative predictive value.        CBC Auto Differential [[id number]]  (Abnormal) Collected: 12/26/22 1145     Specimen: Blood Updated: 12/26/22 1223       WBC 18.67 10*3/mm3         RBC 5.38 10*6/mm3         Hemoglobin 15.0 g/dL         Hematocrit 46.7 %         MCV 86.8 fL         MCH 27.9 pg         MCHC 32.1 g/dL         RDW 14.8 %         RDW-SD 46.9 fl         MPV 8.5 fL         Platelets 296 10*3/mm3         Neutrophil % 85.8 %         Lymphocyte % 4.1 %         Monocyte % 8.2 %         Eosinophil % 0.4 %         Basophil % 0.5 %         Immature Grans % 1.0 %         Neutrophils, Absolute 16.03 10*3/mm3         Lymphocytes, Absolute 0.76 10*3/mm3         Monocytes, Absolute 1.53 10*3/mm3         Eosinophils, Absolute 0.07 10*3/mm3         Basophils, Absolute 0.09 10*3/mm3         Immature Grans, Absolute 0.19       Course as of 12/26/22 1851   Mon Dec 26, 2022   1334 Pt dimer is negative. His cxr reveals possible pnueumonitits. His 02 sat dropped to 88% on 2L. Feel that the pt needs cta chest to r/o pulmonary embolus.  [CW]   1554 Cta chest - no pulmonary embolus noted. RLL pneumonia. Pt has had blood cultures ordered. His abg- pH 7.506; pCO2 34.6; p02 53.8, wbc 18.67; covid 19; influenza a and influenza b are negative. Have reviewed results of testing with pt and pt spouse.. have placed call to Dr. Jean Baptiste at this time for further. Pt 02 sat on 2L 94 %.  [CW]   1600 Spoke with  Dr Wayne - on call for Dr. Jean Baptiste- has accepted for admission. Will be admitted shortly in stable condition.  [CW]       ED Course User Index  [CW] Robina Hood, RANDY            MDM  Number of Diagnoses or Management Options  Hypoxia: new and requires workup  Pneumonia due to infectious organism, unspecified laterality, unspecified part of lung: new and requires workup     Amount and/or Complexity of Data Reviewed  Clinical lab tests: ordered and reviewed  Tests in the radiology section of CPT®: ordered and reviewed     Patient Progress  Patient progress: stable        Final diagnoses:   Pneumonia due to infectious organism, unspecified laterality, unspecified part of lung   Hypoxia            Robina Hood, APRN  12/26/22 1851    Elvin Scott MD   Physician  Family Medicine  H&P     Signed  Date of Service:  12/27/22 0745  Creation Time:  12/27/22 0745     Signed        Expand AllCollapse All    History and Physical     Patient:  Conner Almonte  MRN: [id number]     CHIEF COMPLAINT: Fever, shortness of breath     History Obtained From: the patient   PCP: Donovan Jean Baptiste MD     HISTORY OF PRESENT ILLNESS:   The patient is a 42 y.o. male who presents with worsening upper respiratory symptoms.  The patient states that he developed mild cold-like symptoms over a week ago with sinus drainage and cough.  He reports that his cough gradually worsened and then he became short of breath and developed fever over the last couple of days.  Presented to the ER for worsening condition over the weekend and was found to have right lower lobe pneumonia.  Admitted for IV antibiotics and further treatment.  This morning, he is resting comfortably in bed on 2 L nasal cannula.  He reports that he feels some better but continues to feel short of breath and have a cough.  Denies any fevers overnight.     REVIEW OF SYSTEMS:     Review of Systems   Constitutional: Positive for chills and fever.   HENT:  "Positive for congestion and sore throat.    Respiratory: Positive for cough and shortness of breath.    Cardiovascular: Negative for chest pain.   Gastrointestinal: Negative for diarrhea, nausea and vomiting.   Genitourinary: Negative for dysuria and urgency.   Musculoskeletal: Negative for arthralgias.   Skin: Negative for pallor and rash.   Neurological: Negative for dizziness and headaches.   Psychiatric/Behavioral: Negative for agitation and confusion.                       Medical History:   Diagnosis Date   • GERD (gastroesophageal reflux disease)     • Hyperlipidemia     • Hypertension           Physical Exam:    Vitals: /60 (BP Location: Left arm, Patient Position: Lying)   Pulse 84   Temp 97.6 °F (36.4 °C) (Oral)   Resp 16   Ht 177.8 cm (70\")   Wt (!) 161 kg (356 lb)   SpO2 100%   BMI 51.08 kg/m²   Physical Exam  Constitutional:       Appearance: He is well-developed.   HENT:      Head: Normocephalic and atraumatic.   Eyes:      Conjunctiva/sclera: Conjunctivae normal.      Pupils: Pupils are equal, round, and reactive to light.   Cardiovascular:      Rate and Rhythm: Normal rate and regular rhythm.      Heart sounds: Normal heart sounds. No murmur heard.    No friction rub.   Pulmonary:      Effort: Pulmonary effort is normal. No respiratory distress.      Breath sounds: Rhonchi (RLL) present. No wheezing.   Abdominal:      General: Bowel sounds are normal. There is no distension.      Palpations: Abdomen is soft.      Tenderness: There is no abdominal tenderness.   Musculoskeletal:         General: Normal range of motion.      Cervical back: Normal range of motion.   Skin:     Capillary Refill: Capillary refill takes less than 2 seconds.   Neurological:      Mental Status: He is alert and oriented to person, place, and time.      Cranial Nerves: No cranial nerve deficit.   Psychiatric:         Behavior: Behavior normal.      Comprehensive Metabolic Panel [[id number]]  (Abnormal) Collected: " 12/27/22 0433      Specimen: Blood Updated: 12/27/22 0540       Glucose 226 mg/dL         BUN 14 mg/dL         Creatinine 0.70 mg/dL         Sodium 137 mmol/L         Potassium 3.8 mmol/L         Chloride 99 mmol/L         CO2 30.0 mmol/L         Calcium 9.2 mg/dL         Total Protein 7.5 g/dL         Albumin 4.3 g/dL         ALT (SGPT) 29 U/L         AST (SGOT) 17 U/L         Alkaline Phosphatase 74 U/L         Total Bilirubin 0.5 mg/dL         Globulin 3.2 gm/dL         A/G Ratio 1.3 g/dL         BUN/Creatinine Ratio 20.0       Anion Gap 8.0 mmol/L         eGFR 116.5 mL/min/1.73         Comment: National Kidney Foundation and American Society of Nephrology (ASN) Task Force recommended calculation based on the Chronic Kidney Disease Epidemiology Collaboration (CKD-EPI) equation refit without adjustment for race.        Narrative:       GFR Normal >60  Chronic Kidney Disease <60  Kidney Failure <15        CBC Auto Differential [[id number]]  (Abnormal) Collected: 12/27/22 0433     Specimen: Blood Updated: 12/27/22 0519       WBC 19.86 10*3/mm3         RBC 5.21 10*6/mm3         Hemoglobin 14.7 g/dL         Hematocrit 45.7 %         MCV 87.7 fL         MCH 28.2 pg         MCHC 32.2 g/dL         RDW 14.7 %         RDW-SD 47.6 fl         MPV 8.7 fL         Platelets 309 10*3/mm3         Neutrophil % 89.5 %         Lymphocyte % 4.1 %         Monocyte % 5.1 %         Eosinophil % 0.2 %         Basophil % 0.2 %         Immature Grans % 0.9 %         Neutrophils, Absolute 17.77 10*3/mm3         Lymphocytes, Absolute 0.82 10*3/mm3         Monocytes, Absolute 1.02 10*3/mm3         Eosinophils, Absolute 0.03 10*3/mm3         Basophils, Absolute 0.04 10*3/mm3         Immature Grans, Absolute 0.18 10*3/mm3         nRBC 0.0 /100 WBC       STAT Lactic Acid, Reflex [[id number]]  (Normal) Collected: 12/26/22 1517     Specimen: Blood Updated: 12/26/22 1554       Lactate 1.8 mmol/L       [place] Draw [[id number]] Collected: 12/26/22 1145      Specimen: Blood Updated: 12/26/22 1545     Narrative:       The following orders were created for panel order [place] Draw.  Procedure                               Abnormality         Status                     ---------                               -----------         ------                     Green Top (Gel)[[id number]]                                  Final result               Lavender Top[[id number]]                                     Final result               Red Top[[id number]]                                          Final result               [place] Blood Culture Haresh...[[id number]]                      Final result               Singh Top[[id number]]                                         Final result               Light Blue Top[[id number]]                                   Final result                  Please view results for these tests on the individual orders.     Singh Top [[id number]] Collected: 12/26/22 1145     Specimen: Blood Updated: 12/26/22 1545       Extra Tube Hold for add-ons.       Comment: Auto resulted.        COVID PRE-OP / PRE-PROCEDURE SCREENING ORDER (NO ISOLATION) - Swab, Nasopharynx [[id number]]  (Normal) Collected: 12/26/22 1209     Specimen: Swab from Nasopharynx Updated: 12/26/22 1333     Narrative:       The following orders were created for panel order COVID PRE-OP / PRE-PROCEDURE SCREENING ORDER (NO ISOLATION) - Swab, Nasopharynx.  Procedure                               Abnormality         Status                     ---------                               -----------         ------                     COVID-19, FLU A/B, RSV P...[[id number]]  Normal              Final result                  Please view results for these tests on the individual orders.     COVID-19, FLU A/B, RSV PCR - Swab, Nasopharynx [[id number]]  (Normal) Collected: 12/26/22 1209     Specimen: Swab from Nasopharynx Updated: 12/26/22 1333       COVID19 Not Detected       Influenza A PCR Not Detected        "Influenza B PCR Not Detected       RSV, PCR Not Detected     Narrative:       Fact sheet for providers: https://www.fda.gov/media/613690/download    Fact sheet for patients: https://www.fda.gov/media/090814/download     Test performed by PCR.     Lactic Acid, Plasma [[id number]]  (Abnormal) Collected: 12/26/22 1145     Specimen: Blood Updated: 12/26/22 1319       Lactate 2.4 mmol/L       Troponin [[id number]]  (Normal) Collected: 12/26/22 1145     Specimen: Blood Updated: 12/26/22 1303       Troponin T <0.010 ng/mL       Narrative:       Troponin T Reference Range:  <= 0.03 ng/mL-   Negative for AMI  >0.03 ng/mL-     Abnormal for myocardial necrosis.  Clinicians would have to utilize clinical acumen, EKG, Troponin and serial changes to determine if it is an Acute Myocardial Infarction or myocardial injury due to an underlying chronic condition.         Results may be falsely decreased if patient taking Biotin.        Nano ePrint Blood Culture Bottle Set [[id number]] Collected: 12/26/22 1150     Specimen: Blood from Arm, Right Updated: 12/26/22 1300       Extra Tube Hold for add-ons.       Comment: Auto resulted.        Procalcitonin [[id number]]  (Normal) Collected: 12/26/22 1145     Specimen: Blood Updated: 12/26/22 1249       Procalcitonin 0.18 ng/mL       Narrative:       As a Marker for Sepsis (Non-Neonates):     1. <0.5 ng/mL represents a low risk of severe sepsis and/or septic shock.  2. >2 ng/mL represents a high risk of severe sepsis and/or septic shock.     As a Marker for Lower Respiratory Tract Infections that require antibiotic therapy:     PCT on Admission    Antibiotic Therapy       6-12 Hrs later     >0.5                Strongly Recommended  >0.25 - <0.5        Recommended   0.1 - 0.25          Discouraged              Remeasure/reassess PCT  <0.1                Strongly Discouraged     Remeasure/reassess PCT     As 28 day mortality risk marker: \"Change in Procalcitonin Result\" (>80% or <=80%) if Day 0 (or " Day 1) and Day 4 values are available. Refer to http://www.[facility]-pct-calculator.com     Change in PCT <=80%  A decrease of PCT levels below or equal to 80% defines a positive change in PCT test result representing a higher risk for 28-day all-cause mortality of patients diagnosed with severe sepsis for septic shock.     Change in PCT >80%  A decrease of PCT levels of more than 80% defines a negative change in PCT result representing a lower risk for 28-day all-cause mortality of patients diagnosed with severe sepsis or septic shock.         Green Top (Gel) [[id number]] Collected: 12/26/22 1145     Specimen: Blood Updated: 12/26/22 1245       Extra Tube Hold for add-ons.       Comment: Auto resulted.        Light Blue Top [[id number]] Collected: 12/26/22 1145     Specimen: Blood Updated: 12/26/22 1245       Extra Tube Hold for add-ons.       Comment: Auto resulted        Red Top [[id number]] Collected: 12/26/22 1145     Specimen: Blood Updated: 12/26/22 1245       Extra Tube Hold for add-ons.       Comment: Auto resulted.        BNP [[id number]]  (Normal) Collected: 12/26/22 1145     Specimen: Blood Updated: 12/26/22 1240       proBNP 39.1 pg/mL       Narrative:       Among patients with dyspnea, NT-proBNP is highly sensitive for the detection of acute congestive heart failure. In addition NT-proBNP of <300 pg/ml effectively rules out acute congestive heart failure with 99% negative predictive value.        Comprehensive Metabolic Panel [[id number]]  (Abnormal) Collected: 12/26/22 1145     Specimen: Blood Updated: 12/26/22 1239       Glucose 147 mg/dL         BUN 11 mg/dL         Creatinine 0.73 mg/dL         Sodium 135 mmol/L         Potassium 4.1 mmol/L         Chloride 97 mmol/L         CO2 27.0 mmol/L         Calcium 9.1 mg/dL         Total Protein 7.9 g/dL         Albumin 4.40 g/dL         ALT (SGPT) 32 U/L         AST (SGOT) 18 U/L         Alkaline Phosphatase 73 U/L         Total Bilirubin 1.3 mg/dL          "Globulin 3.5 gm/dL         A/G Ratio 1.3 g/dL         BUN/Creatinine Ratio 15.1       Anion Gap 11.0 mmol/L         eGFR 115.1 mL/min/1.73         Comment: National Kidney Foundation and American Society of Nephrology (ASN) Task Force recommended calculation based on the Chronic Kidney Disease Epidemiology Collaboration (CKD-EPI) equation refit without adjustment for race.        Narrative:       GFR Normal >60  Chronic Kidney Disease <60  Kidney Failure <15        Protime-INR [[id number]]  (Normal) Collected: 12/26/22 1145     Specimen: Blood Updated: 12/26/22 1230       Protime 13.5 Seconds         INR 1.02     D-dimer, Quantitative [[id number]]  (Normal) Collected: 12/26/22 1145     Specimen: Blood Updated: 12/26/22 1230       D-Dimer, Quantitative 0.41 MCGFEU/mL       Narrative:       According to the assay 's published package insert, a normal (<0.50 MCGFEU/mL) D-dimer result in conjunction with a non-high clinical probability assessment, excludes deep vein thrombosis (DVT) and pulmonary embolism (PE) with high sensitivity.     D-dimer values increase with age and this can make VTE exclusion of an older population difficult. To address this, the [employer], based on best available evidence and recent guidelines, recommends that clinicians use age-adjusted D-dimer thresholds in patients greater than 50 years of age with: a) a low probability of PE who do not meet all Pulmonary Embolism Rule Out Criteria, or b) in those with intermediate probability of PE.   The formula for an age-adjusted D-dimer cut-off is \"age/100\".  For example, a 60 year old patient would have an age-adjusted cut-off of 0.60 MCGFEU/mL and an 80 year old 0.80 MCGFEU/mL.     CBC & Differential [[id number]]  (Abnormal) Collected: 12/26/22 1145     Specimen: Blood Updated: 12/26/22 1223     Narrative:       The following orders were created for panel order CBC & Differential.  Procedure                               " Abnormality         Status                     ---------                               -----------         ------                     CBC Auto Differential[[id number]]        Abnormal            Final result                  Please view results for these tests on the individual orders.     CBC Auto Differential [[id number]]  (Abnormal) Collected: 12/26/22 1145     Specimen: Blood Updated: 12/26/22 1223       WBC 18.67 10*3/mm3         RBC 5.38 10*6/mm3         Hemoglobin 15.0 g/dL         Hematocrit 46.7 %         MCV 86.8 fL         MCH 27.9 pg         MCHC 32.1 g/dL         RDW 14.8 %         RDW-SD 46.9 fl         MPV 8.5 fL         Platelets 296 10*3/mm3         Neutrophil % 85.8 %         Lymphocyte % 4.1 %         Monocyte % 8.2 %         Eosinophil % 0.4 %         Basophil % 0.5 %         Immature Grans % 1.0 %         Neutrophils, Absolute 16.03 10*3/mm3         Lymphocytes, Absolute          Blood Updated: 12/26/22 1222        Extra Tube --     Blood Culture - Blood, Arm, Left [[id number]] Collected: 12/26/22 1145     Specimen: Blood from Arm, Left Updated: 12/26/22 1209     Blood Culture - Blood, Arm, Right [[id number]] Collected: 12/26/22 1150     Specimen: Blood from Arm, Right Updated: 12/26/22 1209     Blood Gas, Arterial - [[id number]]  (Abnormal) Collected: 12/26/22 1130     Specimen: Arterial Blood Updated: 12/26/22 1153       Site Right Radial       Bill's Test Positive       pH, Arterial 7.506 pH units         Comment: 83 Value above reference range          pCO2, Arterial 34.6 mm Hg         Comment: 84 Value below reference range          pO2, Arterial 53.8 mm Hg         Comment: 85 Value below critical limit          HCO3, Arterial 27.4 mmol/L         Comment: 83 Value above reference range          Base Excess, Arterial 4.5 mmol/L         Comment: 83 Value above reference range          O2 Saturation, Arterial 91.8 %         Comment: 84 Value below reference range          Temperature 37.0 C          Barometric Pressure for Blood Gas 754 mmHg         Modality Room Air       FIO2 21 %         Ventilator Mode NA       Notified Who DR SAUCEDA       Notified By [email or web address]       Notified Time 12/26/2022 11:58       Collected by [email or web address]       Comment: Meter: [id number]     :  HCASE1          pCO2, Temperature Corrected 34.6 mm Hg         pH, Temp Corrected 7.506 pH Units         pO2, Temperature Corrected 53.8 mm Hg               -----------------------------------------------------------------     Radiology:      XR Chest 1 View     Result Date: 12/26/2022  HISTORY: Cough and fever  CXR: Frontal view the chest obtained.  COMPARISON: 12/28/2019  FINDINGS: Diminished lung volumes with bibasilar densities favoring vascular crowding and atelectasis. Basilar pneumonitis considered. No pleural effusion or pneumothorax. Stable mediastinal contours. No acute regional bony pathology       1. Hypoventilated lungs with basilar densities favoring vascular crowding and atelectasis. Basilar pneumonitis considered. No radiographic evidence of edema. This report was finalized on 12/26/2022 12:27 by Dr. Ashwini Moser MD.     CT Angiogram Chest     Result Date: 12/26/2022  EXAMINATION: CT ANGIOGRAM CHEST-   12/26/2022 2:57 PM CST  HISTORY: Tachycardia. Hypoxia. Shortness of breath.  In order to have a CT radiation dose as low as reasonably achievable Automated Exposure Control was utilized for adjustment of the mA and/or KV according to patient size.  DLP in mGycm= 242.  Chest CT angiogram with IV contrast. CT angiography protocol. CT imaging with bolus IV contrast injection. Under concurrent supervision axial, sagittal, coronal, three-dimensional, and MIP data sets were constructed.  Normal heart size. Normal thoracic aorta.  No mediastinal mass.  Well-opacified pulmonary arteries. No pulmonary embolism.  Patchy pneumonia within the posterior right lower lobe. Questionable small focus of infiltrate within the medial  left lower lobe.  The upper lobes are clear. There is no pneumothorax or pleural effusion.  Summary: 1. No pulmonary embolism. 2. Right lower lobe pneumonia.            This report was finalized on 12/26/2022 15:20 by Dr. Jamal French MD.        Assessment and Plan       on 12/26/2022 15:20 by Dr. Jamal French MD.        Assessment and Plan   Community-acquired pneumonia of right lower lobe  Seen on imaging of chest and has elevated white blood cell count.  CTA showing lobar pneumonia.  Continue Rocephin and azithromycin.  Wean oxygen as able and start aggressive pulmonary toilet.  Received steroids in the ER but will not continue at this time.     Acute respiratory failure with hypoxia  Patient hypoxic in the 80s requiring oxygen supplementation.  Now on 2 L nasal cannula.  Secondary to pneumonia.  Treat as above.     Hypertension  Resume home lisinopril     Hyperlipidemia  Resume home pravastatin     Disposition: Inpatient     CODE STATUS: Full     DVT prophylaxis: Lovenox    Pneumonia due to infectious organism, unspecified laterality, unspecified part of lung    Pneumonia of right lung due to infectious organism    Hypertension        Elvin Scott MD 12/27/2022 07:46 CST            Component   Ref Range & Units 04:33   (12/27/22) 1 d ago   (12/26/22) 5 yr ago   (4/23/17) 5 yr ago   (4/22/17)   WBC   3.40 - 10.80 10*3/mm3 19.86 High   18.67 High   11.74 High  R  20.66 High  R    RBC   4.14 - 5.80 10*6/mm3 5.21  5.38  4.98 R  5.64 R    Hemoglobin   13.0 - 17.7 g/dL 14.7  15.0  13.8 Low  R  16.0 R    Hematocrit   37.5 - 51.0 % 45.7  46.7  42.9 R  47.7 R    MCV   79.0 - 97.0 fL 87.7  86.8  86.1 R  84.6 R    MCH   26.6 - 33.0 pg 28.2  27.9  27.7 Low  R  28.4 R    MCHC   31.5 - 35.7 g/dL 32.2  32.1  32.2 Low  R  33.5 R    RDW   12.3 - 15.4 % 14.7  14.8  15.4 High  R  14.8 R    RDW-SD   37.0 - 54.0 fl 47.6  46.9  48.1 R  45.4 R    MPV   6.0 - 12.0 fL 8.7  8.5  9.1  9.4    Platelets   140 - 450 10*3/mm3 309   296  261 R  315 R    Neutrophil %   42.7 - 76.0 % 89.5 High   85.8 High   79.1 High  R  88.2 High  R    Lymphocyte %   19.6 - 45.3 % 4.1 Low   4.1 Low   8.6 Low  R  6.1 Low  R    Monocyte %   5.0 - 12.0 % 5.1  8.2  8.7 R  4.7 R    Eosinophil %   0.3 - 6.2 % 0.2 Low   0.4  2.8 R  0.4 R    Basophil %   0.0 - 1.5 % 0.2  0.5  0.2 R  0.1 R    Immature Grans %   0.0 - 0.5 % 0.9 High   1.0 High              Final result      Visible to patient: No (scheduled for 12/27/2022  7:40 PM)      Next appt: None     Specimen Information: Blood    0 Result Notes  Component   Ref Range & Units 04:33   (12/27/22) 1 d ago   (12/26/22) 5 yr ago   (4/23/17) 5 yr ago   (4/22/17)   Glucose   65 - 99 mg/dL 226 High   147 High   161 High  R  118 High  R    BUN   6 - 20 mg/dL 14  11  15 R  14 R    Creatinine   0.76 - 1.27 mg/dL 0.70 Low   0.73 Low   0.81 R  0.75 R    Sodium   136 - 145 mmol/L 137  135 Low   139 R  142 R    Potassium   3.5 - 5.2 mmol/L 3.8  4.1  4.5 R  4.4 R    Chloride   98 - 107 mmol/L 99  97 Low   100 R  101 R    CO2   22.0 - 29.0 mmol/L 30.0 High   27.0  30.0 R  25.0 R    Calcium   8.6 - 10.5 mg/dL 9.2            1346 -- 122 Abnormal  -- 127/87 -- -- -- 94   12/26/22 1331 -- 122 Abnormal  -- 106/57 -- -- -- 89 Abnormal    12/26/22 1324 100.5 (38.1) -- -- -- -- -- -- --   12/26/22 1301 -- 129 Abnormal  -- 142/89 -- -- -- 95   12/26/22 12:06:04 99.9 (37.7) -- -- -- -- -- -- --   12/26/22 1201 -- 136 Abnormal  -- 120/78 -- -- -- 92   12/26/22 1158 -- 136 Abnormal  20 -- -- nasal cannula 3 92   12/26/22 1139 -- -- -- -- -- room air -- 88 Abnormal        12/27/22 0614 -- 81 16 -- -- nasal cannula 3  95             Current Facility-Administered Medications   Medication Dose Route Frequency Provider Last Rate Last Admin   • acetaminophen (TYLENOL) tablet 650 mg  650 mg Oral Q4H PRN Jered Rodriguez MD       • azithromycin (ZITHROMAX) 500 mg in sodium chloride 0.9 % 250 mL IVPB-VTB  500 mg Intravenous Q24H Jered Rodriguez  MD Reed       • cefTRIAXone (ROCEPHIN) 1 g in sodium chloride 0.9 % 100 mL IVPB-VTB  1 g Intravenous Q24H Jered Rodriguez MD       • Enoxaparin Sodium (LOVENOX) syringe 40 mg  40 mg Subcutaneous Q24H Jered Rodriguez MD   40 mg at 12/26/22 2000   • HYDROcodone-acetaminophen (NORCO) 5-325 MG per tablet 1 tablet  1 tablet Oral Q4H PRN Jered Rodriguez MD       • ipratropium-albuterol (DUO-NEB) nebulizer solution 3 mL  3 mL Nebulization TID - RT Jered Rodriguez MD   3 mL at 12/27/22 0614   • LORazepam (ATIVAN) tablet 0.5 mg  0.5 mg Oral Q8H PRN Jered Rodriguez MD       • losartan (COZAAR) tablet 25 mg  25 mg Oral Nightly Elvin Scott MD       • melatonin tablet 6 mg  6 mg Oral Nightly PRN Jered Rodriguez MD       • ondansetron (ZOFRAN) tablet 4 mg  4 mg Oral Q6H PRN Jered Rodriguez MD       • pantoprazole (PROTONIX) EC tablet 40 mg  40 mg Oral QAM Jered Rodriguez MD   40 mg at 12/27/22 0554   • pravastatin (PRAVACHOL) tablet 40 mg  40 mg Oral Nightly Elvin Scott MD       • sodium chloride 0.9 % flush 10 mL  10 mL Intravenous Q12H Jered Rodriguez MD   10 mL at 12/27/22 1013   • sodium chloride 0.9 % flush 10 mL  10 mL Intravenous PRN Jered Rodriguez MD       • sodium chloride 0.9 % infusion 40 mL  40 mL Intravenous PRN Jered Rodriguez MD

## 2022-12-27 NOTE — H&P
History and Physical    Patient:  Conner Almonte  MRN: [id number]    CHIEF COMPLAINT: Fever, shortness of breath    History Obtained From: the patient   PCP: Donovan Jean Baptiste MD    HISTORY OF PRESENT ILLNESS:   The patient is a 42 y.o. male who presents with worsening upper respiratory symptoms.  The patient states that he developed mild cold-like symptoms over a week ago with sinus drainage and cough.  He reports that his cough gradually worsened and then he became short of breath and developed fever over the last couple of days.  Presented to the ER for worsening condition over the weekend and was found to have right lower lobe pneumonia.  Admitted for IV antibiotics and further treatment.  This morning, he is resting comfortably in bed on 2 L nasal cannula.  He reports that he feels some better but continues to feel short of breath and have a cough.  Denies any fevers overnight.    REVIEW OF SYSTEMS:    Review of Systems   Constitutional: Positive for chills and fever.   HENT: Positive for congestion and sore throat.    Respiratory: Positive for cough and shortness of breath.    Cardiovascular: Negative for chest pain.   Gastrointestinal: Negative for diarrhea, nausea and vomiting.   Genitourinary: Negative for dysuria and urgency.   Musculoskeletal: Negative for arthralgias.   Skin: Negative for pallor and rash.   Neurological: Negative for dizziness and headaches.   Psychiatric/Behavioral: Negative for agitation and confusion.          Past Medical History:  Past Medical History:   Diagnosis Date   • GERD (gastroesophageal reflux disease)    • Hyperlipidemia    • Hypertension        Past Surgical History:  Past Surgical History:   Procedure Laterality Date   • ADENOIDECTOMY     • CHOLECYSTECTOMY     • COLONOSCOPY N/A 2/12/2020    Procedure: COLONOSCOPY WITH ANESTHESIA;  Surgeon: Souleymane Kinsey MD;  Location: [facility] ENDOSCOPY;  Service: Gastroenterology;  Laterality: N/A;  Pre:Family Hx Colon  "Polyps  Post: Colon Polyps  Dr. Jean Baptiste  CO2 Inflation Used   • TONSILLECTOMY     • TYMPANOSTOMY TUBE PLACEMENT         Medications Prior to Admission:    Medications Prior to Admission   Medication Sig Dispense Refill Last Dose   • amLODIPine-benazepril (LOTREL) 5-40 MG per capsule Take 1 capsule by mouth Daily.      • ipratropium-albuterol (DUO-NEB) 0.5-2.5 mg/mL nebulizer Take 3 mL by nebulization 3 (Three) Times a Day. 360 mL 1    • losartan (COZAAR) 25 MG tablet Take 25 mg by mouth Daily.      • metFORMIN (GLUCOPHAGE) 500 MG tablet Take 500 mg by mouth Daily With Breakfast.      • omeprazole (priLOSEC) 20 MG capsule Take 20 mg by mouth Daily.      • pravastatin (PRAVACHOL) 40 MG tablet Take 40 mg by mouth Daily.          Allergies:  Patient has no known allergies.    Social History:   Social History     Socioeconomic History   • Marital status:    Tobacco Use   • Smoking status: Former     Types: Cigarettes   • Smokeless tobacco: Never   Vaping Use   • Vaping Use: Never used   Substance and Sexual Activity   • Alcohol use: Yes     Comment: beer every weekend   • Drug use: No   • Sexual activity: Defer       Family History:   Family History   Problem Relation Age of Onset   • Colon cancer Father    • Colon polyps Neg Hx            Physical Exam:    Vitals: /60 (BP Location: Left arm, Patient Position: Lying)   Pulse 84   Temp 97.6 °F (36.4 °C) (Oral)   Resp 16   Ht 177.8 cm (70\")   Wt (!) 161 kg (356 lb)   SpO2 100%   BMI 51.08 kg/m²   Physical Exam  Constitutional:       Appearance: He is well-developed.   HENT:      Head: Normocephalic and atraumatic.   Eyes:      Conjunctiva/sclera: Conjunctivae normal.      Pupils: Pupils are equal, round, and reactive to light.   Cardiovascular:      Rate and Rhythm: Normal rate and regular rhythm.      Heart sounds: Normal heart sounds. No murmur heard.    No friction rub.   Pulmonary:      Effort: Pulmonary effort is normal. No respiratory distress.     "  Breath sounds: Rhonchi (RLL) present. No wheezing.   Abdominal:      General: Bowel sounds are normal. There is no distension.      Palpations: Abdomen is soft.      Tenderness: There is no abdominal tenderness.   Musculoskeletal:         General: Normal range of motion.      Cervical back: Normal range of motion.   Skin:     Capillary Refill: Capillary refill takes less than 2 seconds.   Neurological:      Mental Status: He is alert and oriented to person, place, and time.      Cranial Nerves: No cranial nerve deficit.   Psychiatric:         Behavior: Behavior normal.           Lab Results (last 24 hours)     Procedure Component Value Units Date/Time    Comprehensive Metabolic Panel [[id number]]  (Abnormal) Collected: 12/27/22 0433    Specimen: Blood Updated: 12/27/22 0540     Glucose 226 mg/dL      BUN 14 mg/dL      Creatinine 0.70 mg/dL      Sodium 137 mmol/L      Potassium 3.8 mmol/L      Chloride 99 mmol/L      CO2 30.0 mmol/L      Calcium 9.2 mg/dL      Total Protein 7.5 g/dL      Albumin 4.3 g/dL      ALT (SGPT) 29 U/L      AST (SGOT) 17 U/L      Alkaline Phosphatase 74 U/L      Total Bilirubin 0.5 mg/dL      Globulin 3.2 gm/dL      A/G Ratio 1.3 g/dL      BUN/Creatinine Ratio 20.0     Anion Gap 8.0 mmol/L      eGFR 116.5 mL/min/1.73      Comment: National Kidney Foundation and American Society of Nephrology (ASN) Task Force recommended calculation based on the Chronic Kidney Disease Epidemiology Collaboration (CKD-EPI) equation refit without adjustment for race.       Narrative:      GFR Normal >60  Chronic Kidney Disease <60  Kidney Failure <15      CBC Auto Differential [[id number]]  (Abnormal) Collected: 12/27/22 0433    Specimen: Blood Updated: 12/27/22 0519     WBC 19.86 10*3/mm3      RBC 5.21 10*6/mm3      Hemoglobin 14.7 g/dL      Hematocrit 45.7 %      MCV 87.7 fL      MCH 28.2 pg      MCHC 32.2 g/dL      RDW 14.7 %      RDW-SD 47.6 fl      MPV 8.7 fL      Platelets 309 10*3/mm3      Neutrophil % 89.5  %      Lymphocyte % 4.1 %      Monocyte % 5.1 %      Eosinophil % 0.2 %      Basophil % 0.2 %      Immature Grans % 0.9 %      Neutrophils, Absolute 17.77 10*3/mm3      Lymphocytes, Absolute 0.82 10*3/mm3      Monocytes, Absolute 1.02 10*3/mm3      Eosinophils, Absolute 0.03 10*3/mm3      Basophils, Absolute 0.04 10*3/mm3      Immature Grans, Absolute 0.18 10*3/mm3      nRBC 0.0 /100 WBC     STAT Lactic Acid, Reflex [[id number]]  (Normal) Collected: 12/26/22 1514    Specimen: Blood Updated: 12/26/22 1554     Lactate 1.8 mmol/L     [place] Draw [[id number]] Collected: 12/26/22 1145    Specimen: Blood Updated: 12/26/22 1545    Narrative:      The following orders were created for panel order [place] Draw.  Procedure                               Abnormality         Status                     ---------                               -----------         ------                     Green Top (Gel)[[id number]]                                  Final result               Lavender Top[[id number]]                                     Final result               Red Top[[id number]]                                          Final result               [place] Blood Culture Haresh...[[id number]]                      Final result               Singh Top[[id number]]                                         Final result               Light Blue Top[[id number]]                                   Final result                 Please view results for these tests on the individual orders.    Singh Top [[id number]] Collected: 12/26/22 1145    Specimen: Blood Updated: 12/26/22 1545     Extra Tube Hold for add-ons.     Comment: Auto resulted.       COVID PRE-OP / PRE-PROCEDURE SCREENING ORDER (NO ISOLATION) - Swab, Nasopharynx [[id number]]  (Normal) Collected: 12/26/22 1209    Specimen: Swab from Nasopharynx Updated: 12/26/22 1333    Narrative:      The following orders were created for panel order COVID PRE-OP / PRE-PROCEDURE SCREENING ORDER (NO ISOLATION) -  Swab, Nasopharynx.  Procedure                               Abnormality         Status                     ---------                               -----------         ------                     COVID-19, FLU A/B, RSV P...[[id number]]  Normal              Final result                 Please view results for these tests on the individual orders.    COVID-19, FLU A/B, RSV PCR - Swab, Nasopharynx [[id number]]  (Normal) Collected: 12/26/22 1209    Specimen: Swab from Nasopharynx Updated: 12/26/22 1333     COVID19 Not Detected     Influenza A PCR Not Detected     Influenza B PCR Not Detected     RSV, PCR Not Detected    Narrative:      Fact sheet for providers: https://www.fda.gov/media/840754/download    Fact sheet for patients: https://www.fda.gov/media/951509/download    Test performed by PCR.    Lactic Acid, Plasma [[id number]]  (Abnormal) Collected: 12/26/22 1145    Specimen: Blood Updated: 12/26/22 1319     Lactate 2.4 mmol/L     Troponin [[id number]]  (Normal) Collected: 12/26/22 1145    Specimen: Blood Updated: 12/26/22 1303     Troponin T <0.010 ng/mL     Narrative:      Troponin T Reference Range:  <= 0.03 ng/mL-   Negative for AMI  >0.03 ng/mL-     Abnormal for myocardial necrosis.  Clinicians would have to utilize clinical acumen, EKG, Troponin and serial changes to determine if it is an Acute Myocardial Infarction or myocardial injury due to an underlying chronic condition.       Results may be falsely decreased if patient taking Biotin.      [place] Blood Culture Bottle Set [[id number]] Collected: 12/26/22 1150    Specimen: Blood from Arm, Right Updated: 12/26/22 1300     Extra Tube Hold for add-ons.     Comment: Auto resulted.       Procalcitonin [[id number]]  (Normal) Collected: 12/26/22 1145    Specimen: Blood Updated: 12/26/22 1249     Procalcitonin 0.18 ng/mL     Narrative:      As a Marker for Sepsis (Non-Neonates):    1. <0.5 ng/mL represents a low risk of severe sepsis and/or septic shock.  2. >2 ng/mL  "represents a high risk of severe sepsis and/or septic shock.    As a Marker for Lower Respiratory Tract Infections that require antibiotic therapy:    PCT on Admission    Antibiotic Therapy       6-12 Hrs later    >0.5                Strongly Recommended  >0.25 - <0.5        Recommended   0.1 - 0.25          Discouraged              Remeasure/reassess PCT  <0.1                Strongly Discouraged     Remeasure/reassess PCT    As 28 day mortality risk marker: \"Change in Procalcitonin Result\" (>80% or <=80%) if Day 0 (or Day 1) and Day 4 values are available. Refer to http://www.[email or web address]-pct-calculator.com    Change in PCT <=80%  A decrease of PCT levels below or equal to 80% defines a positive change in PCT test result representing a higher risk for 28-day all-cause mortality of patients diagnosed with severe sepsis for septic shock.    Change in PCT >80%  A decrease of PCT levels of more than 80% defines a negative change in PCT result representing a lower risk for 28-day all-cause mortality of patients diagnosed with severe sepsis or septic shock.       Green Top (Gel) [[id number]] Collected: 12/26/22 1145    Specimen: Blood Updated: 12/26/22 1245     Extra Tube Hold for add-ons.     Comment: Auto resulted.       Light Blue Top [[id number]] Collected: 12/26/22 1145    Specimen: Blood Updated: 12/26/22 1245     Extra Tube Hold for add-ons.     Comment: Auto resulted       Red Top [[id number]] Collected: 12/26/22 1145    Specimen: Blood Updated: 12/26/22 1245     Extra Tube Hold for add-ons.     Comment: Auto resulted.       BNP [[id number]]  (Normal) Collected: 12/26/22 1145    Specimen: Blood Updated: 12/26/22 1240     proBNP 39.1 pg/mL     Narrative:      Among patients with dyspnea, NT-proBNP is highly sensitive for the detection of acute congestive heart failure. In addition NT-proBNP of <300 pg/ml effectively rules out acute congestive heart failure with 99% negative predictive value.      Comprehensive Metabolic " Panel [[id number]]  (Abnormal) Collected: 12/26/22 1145    Specimen: Blood Updated: 12/26/22 1239     Glucose 147 mg/dL      BUN 11 mg/dL      Creatinine 0.73 mg/dL      Sodium 135 mmol/L      Potassium 4.1 mmol/L      Chloride 97 mmol/L      CO2 27.0 mmol/L      Calcium 9.1 mg/dL      Total Protein 7.9 g/dL      Albumin 4.40 g/dL      ALT (SGPT) 32 U/L      AST (SGOT) 18 U/L      Alkaline Phosphatase 73 U/L      Total Bilirubin 1.3 mg/dL      Globulin 3.5 gm/dL      A/G Ratio 1.3 g/dL      BUN/Creatinine Ratio 15.1     Anion Gap 11.0 mmol/L      eGFR 115.1 mL/min/1.73      Comment: National Kidney Foundation and American Society of Nephrology (ASN) Task Force recommended calculation based on the Chronic Kidney Disease Epidemiology Collaboration (CKD-EPI) equation refit without adjustment for race.       Narrative:      GFR Normal >60  Chronic Kidney Disease <60  Kidney Failure <15      Protime-INR [[id number]]  (Normal) Collected: 12/26/22 1145    Specimen: Blood Updated: 12/26/22 1230     Protime 13.5 Seconds      INR 1.02    D-dimer, Quantitative [[id number]]  (Normal) Collected: 12/26/22 1145    Specimen: Blood Updated: 12/26/22 1230     D-Dimer, Quantitative 0.41 MCGFEU/mL     Narrative:      According to the assay 's published package insert, a normal (<0.50 MCGFEU/mL) D-dimer result in conjunction with a non-high clinical probability assessment, excludes deep vein thrombosis (DVT) and pulmonary embolism (PE) with high sensitivity.    D-dimer values increase with age and this can make VTE exclusion of an older population difficult. To address this, the American College of Physicians, based on best available evidence and recent guidelines, recommends that clinicians use age-adjusted D-dimer thresholds in patients greater than 50 years of age with: a) a low probability of PE who do not meet all Pulmonary Embolism Rule Out Criteria, or b) in those with intermediate probability of PE.   The formula for an  "age-adjusted D-dimer cut-off is \"age/100\".  For example, a 60 year old patient would have an age-adjusted cut-off of 0.60 MCGFEU/mL and an 80 year old 0.80 MCGFEU/mL.    CBC & Differential [[id number]]  (Abnormal) Collected: 12/26/22 1145    Specimen: Blood Updated: 12/26/22 1223    Narrative:      The following orders were created for panel order CBC & Differential.  Procedure                               Abnormality         Status                     ---------                               -----------         ------                     CBC Auto Differential[[id number]]        Abnormal            Final result                 Please view results for these tests on the individual orders.    CBC Auto Differential [[id number]]  (Abnormal) Collected: 12/26/22 1145    Specimen: Blood Updated: 12/26/22 1223     WBC 18.67 10*3/mm3      RBC 5.38 10*6/mm3      Hemoglobin 15.0 g/dL      Hematocrit 46.7 %      MCV 86.8 fL      MCH 27.9 pg      MCHC 32.1 g/dL      RDW 14.8 %      RDW-SD 46.9 fl      MPV 8.5 fL      Platelets 296 10*3/mm3      Neutrophil % 85.8 %      Lymphocyte % 4.1 %      Monocyte % 8.2 %      Eosinophil % 0.4 %      Basophil % 0.5 %      Immature Grans % 1.0 %      Neutrophils, Absolute 16.03 10*3/mm3      Lymphocytes, Absolute 0.76 10*3/mm3      Monocytes, Absolute 1.53 10*3/mm3      Eosinophils, Absolute 0.07 10*3/mm3      Basophils, Absolute 0.09 10*3/mm3      Immature Grans, Absolute 0.19 10*3/mm3      nRBC 0.0 /100 WBC     Lavender Top [[id number]] Collected: 12/26/22 1145    Specimen: Blood Updated: 12/26/22 1222     Extra Tube --    Blood Culture - Blood, Arm, Left [[id number]] Collected: 12/26/22 1145    Specimen: Blood from Arm, Left Updated: 12/26/22 1209    Blood Culture - Blood, Arm, Right [[id number]] Collected: 12/26/22 1150    Specimen: Blood from Arm, Right Updated: 12/26/22 1209    Blood Gas, Arterial - [[id number]]  (Abnormal) Collected: 12/26/22 1130    Specimen: Arterial Blood Updated: 12/26/22 " 1153     Site Right Radial     Bill's Test Positive     pH, Arterial 7.506 pH units      Comment: 83 Value above reference range        pCO2, Arterial 34.6 mm Hg      Comment: 84 Value below reference range        pO2, Arterial 53.8 mm Hg      Comment: 85 Value below critical limit        HCO3, Arterial 27.4 mmol/L      Comment: 83 Value above reference range        Base Excess, Arterial 4.5 mmol/L      Comment: 83 Value above reference range        O2 Saturation, Arterial 91.8 %      Comment: 84 Value below reference range        Temperature 37.0 C      Barometric Pressure for Blood Gas 754 mmHg      Modality Room Air     FIO2 21 %      Ventilator Mode NA     Notified Who DR SAUCEDA     Notified By [email or web address]     Notified Time 12/26/2022 11:58     Collected by [email or web address]     Comment: Meter: [id number]     :  [email or web address]        pCO2, Temperature Corrected 34.6 mm Hg      pH, Temp Corrected 7.506 pH Units      pO2, Temperature Corrected 53.8 mm Hg            -----------------------------------------------------------------    Radiology:     XR Chest 1 View    Result Date: 12/26/2022  HISTORY: Cough and fever  CXR: Frontal view the chest obtained.  COMPARISON: 12/28/2019  FINDINGS: Diminished lung volumes with bibasilar densities favoring vascular crowding and atelectasis. Basilar pneumonitis considered. No pleural effusion or pneumothorax. Stable mediastinal contours. No acute regional bony pathology      1. Hypoventilated lungs with basilar densities favoring vascular crowding and atelectasis. Basilar pneumonitis considered. No radiographic evidence of edema. This report was finalized on 12/26/2022 12:27 by Dr. Ashwini Moser MD.    CT Angiogram Chest    Result Date: 12/26/2022  EXAMINATION: CT ANGIOGRAM CHEST-   12/26/2022 2:57 PM CST  HISTORY: Tachycardia. Hypoxia. Shortness of breath.  In order to have a CT radiation dose as low as reasonably achievable Automated Exposure Control was utilized for adjustment  of the mA and/or KV according to patient size.  DLP in mGycm= 242.  Chest CT angiogram with IV contrast. CT angiography protocol. CT imaging with bolus IV contrast injection. Under concurrent supervision axial, sagittal, coronal, three-dimensional, and MIP data sets were constructed.  Normal heart size. Normal thoracic aorta.  No mediastinal mass.  Well-opacified pulmonary arteries. No pulmonary embolism.  Patchy pneumonia within the posterior right lower lobe. Questionable small focus of infiltrate within the medial left lower lobe.  The upper lobes are clear. There is no pneumothorax or pleural effusion.  Summary: 1. No pulmonary embolism. 2. Right lower lobe pneumonia.            This report was finalized on 12/26/2022 15:20 by Dr. Jamal French MD.      Assessment and Plan   Community-acquired pneumonia of right lower lobe  Seen on imaging of chest and has elevated white blood cell count.  CTA showing lobar pneumonia.  Continue Rocephin and azithromycin.  Wean oxygen as able and start aggressive pulmonary toilet.  Received steroids in the ER but will not continue at this time.    Acute respiratory failure with hypoxia  Patient hypoxic in the 80s requiring oxygen supplementation.  Now on 2 L nasal cannula.  Secondary to pneumonia.  Treat as above.    Hypertension  Resume home lisinopril    Hyperlipidemia  Resume home pravastatin    Disposition: Inpatient    CODE STATUS: Full    DVT prophylaxis: Lovenox    Pneumonia due to infectious organism, unspecified laterality, unspecified part of lung    Pneumonia of right lung due to infectious organism    Hypertension      Elvin Scott MD 12/27/2022 07:46 CST

## 2022-12-28 LAB
ALBUMIN SERPL-MCNC: 3.6 G/DL (ref 3.5–5.2)
ALBUMIN/GLOB SERPL: 1.4 G/DL
ALP SERPL-CCNC: 66 U/L (ref 39–117)
ALT SERPL W P-5'-P-CCNC: 23 U/L (ref 1–41)
ANION GAP SERPL CALCULATED.3IONS-SCNC: 12 MMOL/L (ref 5–15)
AST SERPL-CCNC: 13 U/L (ref 1–40)
BASOPHILS # BLD AUTO: 0.05 10*3/MM3 (ref 0–0.2)
BASOPHILS NFR BLD AUTO: 0.3 % (ref 0–1.5)
BILIRUB SERPL-MCNC: 0.4 MG/DL (ref 0–1.2)
BUN SERPL-MCNC: 17 MG/DL (ref 6–20)
BUN/CREAT SERPL: 21.5 (ref 7–25)
CALCIUM SPEC-SCNC: 8.2 MG/DL (ref 8.6–10.5)
CHLORIDE SERPL-SCNC: 103 MMOL/L (ref 98–107)
CO2 SERPL-SCNC: 26 MMOL/L (ref 22–29)
CREAT SERPL-MCNC: 0.79 MG/DL (ref 0.76–1.27)
DEPRECATED RDW RBC AUTO: 47.1 FL (ref 37–54)
EGFRCR SERPLBLD CKD-EPI 2021: 112.3 ML/MIN/1.73
EOSINOPHIL # BLD AUTO: 0.1 10*3/MM3 (ref 0–0.4)
EOSINOPHIL NFR BLD AUTO: 0.7 % (ref 0.3–6.2)
ERYTHROCYTE [DISTWIDTH] IN BLOOD BY AUTOMATED COUNT: 14.8 % (ref 12.3–15.4)
GLOBULIN UR ELPH-MCNC: 2.5 GM/DL
GLUCOSE BLDC GLUCOMTR-MCNC: 141 MG/DL (ref 70–130)
GLUCOSE BLDC GLUCOMTR-MCNC: 213 MG/DL (ref 70–130)
GLUCOSE BLDC GLUCOMTR-MCNC: 264 MG/DL (ref 70–130)
GLUCOSE SERPL-MCNC: 155 MG/DL (ref 65–99)
HBA1C MFR BLD: 6.2 % (ref 4.8–5.6)
HCT VFR BLD AUTO: 41.4 % (ref 37.5–51)
HGB BLD-MCNC: 13.5 G/DL (ref 13–17.7)
IMM GRANULOCYTES # BLD AUTO: 0.12 10*3/MM3 (ref 0–0.05)
IMM GRANULOCYTES NFR BLD AUTO: 0.8 % (ref 0–0.5)
LYMPHOCYTES # BLD AUTO: 1.36 10*3/MM3 (ref 0.7–3.1)
LYMPHOCYTES NFR BLD AUTO: 9.4 % (ref 19.6–45.3)
MCH RBC QN AUTO: 28.5 PG (ref 26.6–33)
MCHC RBC AUTO-ENTMCNC: 32.6 G/DL (ref 31.5–35.7)
MCV RBC AUTO: 87.5 FL (ref 79–97)
MONOCYTES # BLD AUTO: 1.08 10*3/MM3 (ref 0.1–0.9)
MONOCYTES NFR BLD AUTO: 7.5 % (ref 5–12)
NEUTROPHILS NFR BLD AUTO: 11.7 10*3/MM3 (ref 1.7–7)
NEUTROPHILS NFR BLD AUTO: 81.3 % (ref 42.7–76)
NRBC BLD AUTO-RTO: 0 /100 WBC (ref 0–0.2)
PLATELET # BLD AUTO: 266 10*3/MM3 (ref 140–450)
PMV BLD AUTO: 8.8 FL (ref 6–12)
POTASSIUM SERPL-SCNC: 4.1 MMOL/L (ref 3.5–5.2)
PROT SERPL-MCNC: 6.1 G/DL (ref 6–8.5)
RBC # BLD AUTO: 4.73 10*6/MM3 (ref 4.14–5.8)
SODIUM SERPL-SCNC: 141 MMOL/L (ref 136–145)
WBC NRBC COR # BLD: 14.41 10*3/MM3 (ref 3.4–10.8)

## 2022-12-28 PROCEDURE — 83036 HEMOGLOBIN GLYCOSYLATED A1C: CPT | Performed by: FAMILY MEDICINE

## 2022-12-28 PROCEDURE — 63710000001 INSULIN LISPRO (HUMAN) PER 5 UNITS: Performed by: FAMILY MEDICINE

## 2022-12-28 PROCEDURE — 94799 UNLISTED PULMONARY SVC/PX: CPT

## 2022-12-28 PROCEDURE — 80053 COMPREHEN METABOLIC PANEL: CPT | Performed by: FAMILY MEDICINE

## 2022-12-28 PROCEDURE — 25010000002 METHYLPREDNISOLONE PER 40 MG: Performed by: FAMILY MEDICINE

## 2022-12-28 PROCEDURE — 25010000002 CEFTRIAXONE PER 250 MG: Performed by: FAMILY MEDICINE

## 2022-12-28 PROCEDURE — 25010000002 ENOXAPARIN PER 10 MG: Performed by: FAMILY MEDICINE

## 2022-12-28 PROCEDURE — 82962 GLUCOSE BLOOD TEST: CPT

## 2022-12-28 PROCEDURE — 85025 COMPLETE CBC W/AUTO DIFF WBC: CPT | Performed by: FAMILY MEDICINE

## 2022-12-28 PROCEDURE — 25010000002 AZITHROMYCIN PER 500 MG: Performed by: FAMILY MEDICINE

## 2022-12-28 RX ORDER — INSULIN LISPRO 100 [IU]/ML
0-9 INJECTION, SOLUTION INTRAVENOUS; SUBCUTANEOUS
Status: DISCONTINUED | OUTPATIENT
Start: 2022-12-28 | End: 2022-12-30 | Stop reason: HOSPADM

## 2022-12-28 RX ORDER — LOSARTAN POTASSIUM 50 MG/1
100 TABLET ORAL NIGHTLY
Status: DISCONTINUED | OUTPATIENT
Start: 2022-12-28 | End: 2022-12-30 | Stop reason: HOSPADM

## 2022-12-28 RX ORDER — METHYLPREDNISOLONE SODIUM SUCCINATE 40 MG/ML
40 INJECTION, POWDER, LYOPHILIZED, FOR SOLUTION INTRAMUSCULAR; INTRAVENOUS EVERY 12 HOURS
Status: DISCONTINUED | OUTPATIENT
Start: 2022-12-28 | End: 2022-12-30 | Stop reason: HOSPADM

## 2022-12-28 RX ORDER — DEXTROSE MONOHYDRATE 25 G/50ML
25 INJECTION, SOLUTION INTRAVENOUS
Status: DISCONTINUED | OUTPATIENT
Start: 2022-12-28 | End: 2022-12-30 | Stop reason: HOSPADM

## 2022-12-28 RX ORDER — NICOTINE POLACRILEX 4 MG
15 LOZENGE BUCCAL
Status: DISCONTINUED | OUTPATIENT
Start: 2022-12-28 | End: 2022-12-30 | Stop reason: HOSPADM

## 2022-12-28 RX ADMIN — METHYLPREDNISOLONE SODIUM SUCCINATE 40 MG: 40 INJECTION, POWDER, FOR SOLUTION INTRAMUSCULAR; INTRAVENOUS at 20:39

## 2022-12-28 RX ADMIN — AZITHROMYCIN DIHYDRATE 500 MG: 500 INJECTION, POWDER, LYOPHILIZED, FOR SOLUTION INTRAVENOUS at 16:47

## 2022-12-28 RX ADMIN — LOSARTAN POTASSIUM 100 MG: 50 TABLET, FILM COATED ORAL at 20:39

## 2022-12-28 RX ADMIN — IPRATROPIUM BROMIDE AND ALBUTEROL SULFATE 3 ML: .5; 3 SOLUTION RESPIRATORY (INHALATION) at 20:57

## 2022-12-28 RX ADMIN — CEFTRIAXONE 1 G: 1 INJECTION, POWDER, FOR SOLUTION INTRAMUSCULAR; INTRAVENOUS at 15:39

## 2022-12-28 RX ADMIN — IPRATROPIUM BROMIDE AND ALBUTEROL SULFATE 3 ML: .5; 3 SOLUTION RESPIRATORY (INHALATION) at 06:33

## 2022-12-28 RX ADMIN — ACETAMINOPHEN 650 MG: 325 TABLET, FILM COATED ORAL at 07:04

## 2022-12-28 RX ADMIN — Medication 10 ML: at 20:39

## 2022-12-28 RX ADMIN — Medication 10 ML: at 08:04

## 2022-12-28 RX ADMIN — INSULIN LISPRO 4 UNITS: 100 INJECTION, SOLUTION INTRAVENOUS; SUBCUTANEOUS at 11:34

## 2022-12-28 RX ADMIN — METHYLPREDNISOLONE SODIUM SUCCINATE 40 MG: 40 INJECTION, POWDER, FOR SOLUTION INTRAMUSCULAR; INTRAVENOUS at 08:08

## 2022-12-28 RX ADMIN — PANTOPRAZOLE SODIUM 40 MG: 40 TABLET, DELAYED RELEASE ORAL at 06:52

## 2022-12-28 RX ADMIN — ENOXAPARIN SODIUM 40 MG: 100 INJECTION SUBCUTANEOUS at 17:05

## 2022-12-28 RX ADMIN — INSULIN LISPRO 6 UNITS: 100 INJECTION, SOLUTION INTRAVENOUS; SUBCUTANEOUS at 17:04

## 2022-12-28 RX ADMIN — PRAVASTATIN SODIUM 40 MG: 20 TABLET ORAL at 20:39

## 2022-12-28 RX ADMIN — ENOXAPARIN SODIUM 40 MG: 100 INJECTION SUBCUTANEOUS at 06:52

## 2022-12-28 RX ADMIN — IPRATROPIUM BROMIDE AND ALBUTEROL SULFATE 3 ML: .5; 3 SOLUTION RESPIRATORY (INHALATION) at 14:33

## 2022-12-28 NOTE — PAYOR COMM NOTE
"12/27 CLINICAL  [id number]   [phone]    Conner Jose (42 y.o. Male)     Date of Birth   1980    Social Security Number       Address   [address] [address] [facility] [address] [place] [address]    Home Phone   [phone]    MRN   [id number]       [facility]    Marital Status                               Admission Date   12/26/22    Admission Type   Emergency    Admitting Provider       Attending Provider   Elvin Scott MD    Department, Room/Bed   [facility] 3C, 376/1       Discharge Date       Discharge Disposition       Discharge Destination                               Attending Provider: Elvin Scott MD    Allergies: No Known Allergies    Isolation: None   Infection: None   Code Status: CPR    Ht: 177.8 cm (70\")   Wt: 161 kg (356 lb)    Admission Cmt: None   Principal Problem: Pneumonia due to infectious organism, unspecified laterality, unspecified part of lung [J18.9]                 Active Insurance as of 12/26/2022     Primary Coverage     Payor Plan Insurance Group Employer/Plan Group    [employer] [facility] [employer] [facility] PPO [id number]     Payor Plan Address Payor Plan Phone Number Payor Plan Fax Number Effective Dates    [address] [phone]  8/1/2011 - None Entered    [facility] [id number]       Subscriber Name Subscriber Birth Date Member ID       CONNER JOSE 1980 [id number]                 Emergency Contacts      (Rel.) Home Phone Work Phone Mobile Phone    Leigh Jose (Spouse) [phone] -- --            Oxygen Therapy (last 2 days)     Date/Time SpO2 Device (Oxygen Therapy) Flow (L/min) Oxygen Concentration (%) ETCO2 (mmHg)    12/28/22 0742 91 room air -- -- --    12/28/22 0640 99 humidified;nasal cannula 4 -- --    12/28/22 0633 93 humidified;nasal cannula 4 -- --    12/28/22 0332 90 nasal cannula;high-flow nasal cannula 4 -- --    12/28/22 0316 93 nasal cannula 4 -- --    12/27/22 2963 94 nasal cannula 2 " -- --    12/27/22 2033 100 nasal cannula 1.5 -- --    12/27/22 2028 98 nasal cannula 1.5 -- --    12/27/22 2024 97 nasal cannula 1.5 -- --    12/27/22 1956 98 nasal cannula 1.5 -- --    12/27/22 1918 88 room air -- -- --    12/27/22 1755 94 room air -- -- --    12/27/22 1514 99 nasal cannula 2 -- --    12/27/22 1453 99 nasal cannula 2 -- --    12/27/22 1448 97 nasal cannula 4 -- --    12/27/22 1157 93 nasal cannula 2 -- --    12/27/22 0800 -- nasal cannula 2 -- --    12/27/22 0753 95 nasal cannula 2 -- --    12/27/22 0619 100 nasal cannula 2 -- --    12/27/22 0614 95 nasal cannula 3 -- --    12/27/22 0354 92 nasal cannula 2 -- --    12/26/22 2336 90 nasal cannula 2 -- --    12/26/22 2047 97 nasal cannula 2 -- --    12/26/22 2039 93 nasal cannula 2 -- --    12/26/22 2006 91 nasal cannula 2 -- --    12/26/22 1800 -- nasal cannula 2 -- --    12/26/22 1754 93 nasal cannula 2 -- --    12/26/22 1659 91 -- -- -- --    12/26/22 1516 90 -- -- -- --    12/26/22 1431 92 -- -- -- --    12/26/22 1415 93 -- -- -- --    12/26/22 1401 97 -- -- -- --    12/26/22 1346 94 -- -- -- --    12/26/22 1331 89 -- -- -- --    12/26/22 1301 95 -- -- -- --    12/26/22 1201 92 -- -- -- --    12/26/22 1158 92 nasal cannula 3 -- --    12/26/22 1139 88 room air -- -- --          Current Facility-Administered Medications   Medication Dose Route Frequency Provider Last Rate Last Admin   • acetaminophen (TYLENOL) tablet 650 mg  650 mg Oral Q4H PRN Jered Rodriguez MD   650 mg at 12/28/22 0704   • azithromycin (ZITHROMAX) 500 mg in sodium chloride 0.9 % 250 mL IVPB-VTB  500 mg Intravenous Q24H Jered Rodriguez MD   500 mg at 12/27/22 1633   • cefTRIAXone (ROCEPHIN) 1 g in sodium chloride 0.9 % 100 mL IVPB-VTB  1 g Intravenous Q24H Jered Rodriguez  mL/hr at 12/27/22 1536 1 g at 12/27/22 1536   • dextrose (D50W) (25 g/50 mL) IV injection 25 g  25 g Intravenous Q15 Min PRN Elvin Scott MD       • dextrose (GLUTOSE) oral gel  15 g  15 g Oral Q15 Min PRN Elvin Scott MD       • Enoxaparin Sodium (LOVENOX) syringe 40 mg  40 mg Subcutaneous Q12H Elvin Scott MD   40 mg at 12/28/22 0652   • glucagon (human recombinant) (GLUCAGEN DIAGNOSTIC) injection 1 mg  1 mg Intramuscular Q15 Min PRN Elvin Scott MD       • HYDROcodone-acetaminophen (NORCO) 5-325 MG per tablet 1 tablet  1 tablet Oral Q4H PRN Jered Rodriguez MD       • Insulin Lispro (humaLOG) injection 0-9 Units  0-9 Units Subcutaneous TID AC Elvin Scott MD       • ipratropium-albuterol (DUO-NEB) nebulizer solution 3 mL  3 mL Nebulization TID - RT Jered Rodriguez MD   3 mL at 12/28/22 0633   • LORazepam (ATIVAN) tablet 0.5 mg  0.5 mg Oral Q8H PRN Jered Rodriguez MD       • losartan (COZAAR) tablet 100 mg  100 mg Oral Nightly Elvin Scott MD       • melatonin tablet 6 mg  6 mg Oral Nightly PRN Jered Rodriguez MD       • metFORMIN (GLUCOPHAGE) tablet 500 mg  500 mg Oral BID With Meals Elvin Scott MD       • methylPREDNISolone sodium succinate (SOLU-Medrol) injection 40 mg  40 mg Intravenous Q12H Elvin Scott MD   40 mg at 12/28/22 0808   • ondansetron (ZOFRAN) tablet 4 mg  4 mg Oral Q6H PRN Jered Rodriguez MD       • pantoprazole (PROTONIX) EC tablet 40 mg  40 mg Oral QAM Jered Rodriguez MD   40 mg at 12/28/22 0652   • pravastatin (PRAVACHOL) tablet 40 mg  40 mg Oral Nightly Elvin Scott MD   40 mg at 12/27/22 2043   • sodium chloride 0.9 % flush 10 mL  10 mL Intravenous Q12H Jered Rodriguez MD   10 mL at 12/28/22 0804   • sodium chloride 0.9 % flush 10 mL  10 mL Intravenous PRN Jered Rodriguez MD       • sodium chloride 0.9 % infusion 40 mL  40 mL Intravenous PRN Jered Rodriguez MD         Orders (last 24 hrs)      Start     Ordered    12/28/22 2100  losartan (COZAAR) tablet 100 mg  Nightly         12/28/22 0732    12/28/22 1100  POC Glucose TID AC  3 Times Daily Before  Meals       12/28/22 0729    12/28/22 0817  POC Glucose Once  PROCEDURE ONCE         12/28/22 0805    12/28/22 0815  Insulin Lispro (humaLOG) injection 0-9 Units  3 Times Daily Before Meals         12/28/22 0729    12/28/22 0815  metFORMIN (GLUCOPHAGE) tablet 500 mg  2 Times Daily With Meals         12/28/22 0729    12/28/22 0800  methylPREDNISolone sodium succinate (SOLU-Medrol) injection 40 mg  Every 12 Hours         12/28/22 0729    12/28/22 0729  Hemoglobin A1c  Once         12/28/22 0729    12/28/22 0728  Do NOT Hold Basal or Correction Scale Insulin When Patient is NPO, Hold Scheduled Mealtime (Bolus) Insulin if NPO  Continuous         12/28/22 0729    12/28/22 0727  dextrose (GLUTOSE) oral gel 15 g  Every 15 Minutes PRN         12/28/22 0729    12/28/22 0727  dextrose (D50W) (25 g/50 mL) IV injection 25 g  Every 15 Minutes PRN         12/28/22 0729    12/28/22 0727  glucagon (human recombinant) (GLUCAGEN DIAGNOSTIC) injection 1 mg  Every 15 Minutes PRN         12/28/22 0729    12/28/22 0624  CBC & Differential  Once         12/28/22 0624    12/28/22 0624  Comprehensive Metabolic Panel  Once         12/28/22 0624    12/28/22 0624  CBC Auto Differential  PROCEDURE ONCE         12/28/22 0624    12/27/22 2100  losartan (COZAAR) tablet 25 mg  Nightly,   Status:  Discontinued         12/27/22 1057    12/27/22 2100  pravastatin (PRAVACHOL) tablet 40 mg  Nightly         12/27/22 1057    12/27/22 1830  Enoxaparin Sodium (LOVENOX) syringe 40 mg  Every 12 Hours Scheduled         12/27/22 1730    12/27/22 1630  azithromycin (ZITHROMAX) 500 mg in sodium chloride 0.9 % 250 mL IVPB-VTB  Every 24 Hours         12/26/22 1754    12/27/22 1600  cefTRIAXone (ROCEPHIN) 1 g in sodium chloride 0.9 % 100 mL IVPB-VTB  Every 24 Hours         12/26/22 1754    12/27/22 1000  Incentive Spirometry  Every 4 Hours While Awake       12/27/22 0745    12/27/22 0945  losartan (COZAAR) tablet 25 mg  Every 24 Hours Scheduled,   Status:   Discontinued         12/27/22 0848    12/27/22 0700  pantoprazole (PROTONIX) EC tablet 40 mg  Every Morning         12/26/22 1754    12/26/22 2100  sodium chloride 0.9 % flush 10 mL  Every 12 Hours Scheduled         12/26/22 1754    12/26/22 2030  ipratropium-albuterol (DUO-NEB) nebulizer solution 3 mL  3 Times Daily - RT         12/26/22 1754    12/26/22 2000  Vital Signs  Every 4 Hours       12/26/22 1754    12/26/22 1900  Enoxaparin Sodium (LOVENOX) syringe 40 mg  Every 24 Hours Scheduled,   Status:  Discontinued         12/26/22 1754    12/26/22 1845  pravastatin (PRAVACHOL) tablet 40 mg  Daily,   Status:  Discontinued         12/26/22 1754    12/26/22 1800  Oral Care  2 Times Daily       12/26/22 1754    12/26/22 1755  Intake & Output  Every Shift       12/26/22 1754    12/26/22 1754  acetaminophen (TYLENOL) tablet 650 mg  Every 4 Hours PRN         12/26/22 1754    12/26/22 1754  HYDROcodone-acetaminophen (NORCO) 5-325 MG per tablet 1 tablet  Every 4 Hours PRN         12/26/22 1754    12/26/22 1754  LORazepam (ATIVAN) tablet 0.5 mg  Every 8 Hours PRN         12/26/22 1754    12/26/22 1754  melatonin tablet 6 mg  Nightly PRN         12/26/22 1754    12/26/22 1754  ondansetron (ZOFRAN) tablet 4 mg  Every 6 Hours PRN         12/26/22 1754    12/26/22 1754  sodium chloride 0.9 % flush 10 mL  As Needed         12/26/22 1754    12/26/22 1754  sodium chloride 0.9 % infusion 40 mL  As Needed         12/26/22 1754    Unscheduled  Follow Hypoglycemia Standing Orders For Blood Glucose <70 & Notify Provider of Treatment  As Needed      Comments: Follow Hypoglycemia Orders As Outlined in Process Instructions (Open Order Report to View Full Instructions)  Notify Provider Any Time Hypoglycemia Treatment is Administered    12/28/22 0791    --  metFORMIN (GLUCOPHAGE) 500 MG tablet  Daily With Breakfast,   Status:  Discontinued         12/26/22 1800                Ventilator/Non-Invasive Ventilation Settings (From admission,  onward)    None           Physician Progress Notes (last 72 hours)      Elvin Scott MD at 12/28/22 0730            Daily Progress Note  Conner Almonte  MRN: [id number] LOS: 2    Admit Date: 12/26/2022 12/28/2022 07:30 CST    Subjective:          Chief Complaint:  Chief Complaint   Patient presents with   • Shortness of Breath       Interval History:    Reviewed overnight events and nursing notes.   Patient had multiple desats overnight.  He does wear BiPAP at home and did not bring it in.  They were able to get his oxygen up by titrating to 4 L.    Review of Systems   Constitutional: Negative for chills and fever.   HENT: Positive for congestion.    Respiratory: Positive for cough.        DIET:  Diet: Regular/House Diet; Texture: Regular Texture (IDDSI 7); Fluid Consistency: Thin (IDDSI 0)    Medications:      azithromycin, 500 mg, Intravenous, Q24H  cefTRIAXone, 1 g, Intravenous, Q24H  enoxaparin, 40 mg, Subcutaneous, Q12H  insulin lispro, 0-9 Units, Subcutaneous, TID AC  ipratropium-albuterol, 3 mL, Nebulization, TID - RT  losartan, 25 mg, Oral, Nightly  metFORMIN, 500 mg, Oral, BID With Meals  methylPREDNISolone sodium succinate, 40 mg, Intravenous, Q12H  pantoprazole, 40 mg, Oral, QAM  pravastatin, 40 mg, Oral, Nightly  sodium chloride, 10 mL, Intravenous, Q12H        Data:     Code Status:   Code Status and Medical Interventions:   Ordered at: 12/26/22 1717     Code Status (Patient has no pulse and is not breathing):    CPR (Attempt to Resuscitate)     Medical Interventions (Patient has pulse or is breathing):    Full Support       Family History   Problem Relation Age of Onset   • Colon cancer Father    • Colon polyps Neg Hx      Social History     Socioeconomic History   • Marital status:    Tobacco Use   • Smoking status: Former     Types: Cigarettes   • Smokeless tobacco: Never   Vaping Use   • Vaping Use: Never used   Substance and Sexual Activity   • Alcohol use: Yes     Comment: beer  every weekend   • Drug use: No   • Sexual activity: Defer       Labs:    Lab Results (last 72 hours)     Procedure Component Value Units Date/Time    Comprehensive Metabolic Panel [[id number]]  (Abnormal) Collected: 12/28/22 0637    Specimen: Blood Updated: 12/28/22 0714     Glucose 155 mg/dL      BUN 17 mg/dL      Creatinine 0.79 mg/dL      Sodium 141 mmol/L      Potassium 4.1 mmol/L      Chloride 103 mmol/L      CO2 26.0 mmol/L      Calcium 8.2 mg/dL      Total Protein 6.1 g/dL      Albumin 3.6 g/dL      ALT (SGPT) 23 U/L      AST (SGOT) 13 U/L      Alkaline Phosphatase 66 U/L      Total Bilirubin 0.4 mg/dL      Globulin 2.5 gm/dL      A/G Ratio 1.4 g/dL      BUN/Creatinine Ratio 21.5     Anion Gap 12.0 mmol/L      eGFR 112.3 mL/min/1.73      Comment: National Kidney Foundation and American Society of Nephrology (ASN) Task Force recommended calculation based on the Chronic Kidney Disease Epidemiology Collaboration (CKD-EPI) equation refit without adjustment for race.       Narrative:      GFR Normal >60  Chronic Kidney Disease <60  Kidney Failure <15      CBC & Differential [[id number]]  (Abnormal) Collected: 12/28/22 0637    Specimen: Blood Updated: 12/28/22 0652    Narrative:      The following orders were created for panel order CBC & Differential.  Procedure                               Abnormality         Status                     ---------                               -----------         ------                     CBC Auto Differential[[id number]]        Abnormal            Final result                 Please view results for these tests on the individual orders.    CBC Auto Differential [[id number]]  (Abnormal) Collected: 12/28/22 0637    Specimen: Blood Updated: 12/28/22 0652     WBC 14.41 10*3/mm3      RBC 4.73 10*6/mm3      Hemoglobin 13.5 g/dL      Hematocrit 41.4 %      MCV 87.5 fL      MCH 28.5 pg      MCHC 32.6 g/dL      RDW 14.8 %      RDW-SD 47.1 fl      MPV 8.8 fL      Platelets 266 10*3/mm3       Neutrophil % 81.3 %      Lymphocyte % 9.4 %      Monocyte % 7.5 %      Eosinophil % 0.7 %      Basophil % 0.3 %      Immature Grans % 0.8 %      Neutrophils, Absolute 11.70 10*3/mm3      Lymphocytes, Absolute 1.36 10*3/mm3      Monocytes, Absolute 1.08 10*3/mm3      Eosinophils, Absolute 0.10 10*3/mm3      Basophils, Absolute 0.05 10*3/mm3      Immature Grans, Absolute 0.12 10*3/mm3      nRBC 0.0 /100 WBC     Blood Culture - Blood, Arm, Right [[id number]]  (Normal) Collected: 12/26/22 1150    Specimen: Blood from Arm, Right Updated: 12/27/22 1215     Blood Culture No growth at 24 hours    Blood Culture - Blood, Arm, Left [[id number]]  (Normal) Collected: 12/26/22 1145    Specimen: Blood from Arm, Left Updated: 12/27/22 1215     Blood Culture No growth at 24 hours    Comprehensive Metabolic Panel [[id number]]  (Abnormal) Collected: 12/27/22 0433    Specimen: Blood Updated: 12/27/22 0540     Glucose 226 mg/dL      BUN 14 mg/dL      Creatinine 0.70 mg/dL      Sodium 137 mmol/L      Potassium 3.8 mmol/L      Chloride 99 mmol/L      CO2 30.0 mmol/L      Calcium 9.2 mg/dL      Total Protein 7.5 g/dL      Albumin 4.3 g/dL      ALT (SGPT) 29 U/L      AST (SGOT) 17 U/L      Alkaline Phosphatase 74 U/L      Total Bilirubin 0.5 mg/dL      Globulin 3.2 gm/dL      A/G Ratio 1.3 g/dL      BUN/Creatinine Ratio 20.0     Anion Gap 8.0 mmol/L      eGFR 116.5 mL/min/1.73      Comment: National Kidney Foundation and American Society of Nephrology (ASN) Task Force recommended calculation based on the Chronic Kidney Disease Epidemiology Collaboration (CKD-EPI) equation refit without adjustment for race.       Narrative:      GFR Normal >60  Chronic Kidney Disease <60  Kidney Failure <15      CBC Auto Differential [[id number]]  (Abnormal) Collected: 12/27/22 0433    Specimen: Blood Updated: 12/27/22 0519     WBC 19.86 10*3/mm3      RBC 5.21 10*6/mm3      Hemoglobin 14.7 g/dL      Hematocrit 45.7 %      MCV 87.7 fL      MCH 28.2 pg      MCHC  32.2 g/dL      RDW 14.7 %      RDW-SD 47.6 fl      MPV 8.7 fL      Platelets 309 10*3/mm3      Neutrophil % 89.5 %      Lymphocyte % 4.1 %      Monocyte % 5.1 %      Eosinophil % 0.2 %      Basophil % 0.2 %      Immature Grans % 0.9 %      Neutrophils, Absolute 17.77 10*3/mm3      Lymphocytes, Absolute 0.82 10*3/mm3      Monocytes, Absolute 1.02 10*3/mm3      Eosinophils, Absolute 0.03 10*3/mm3      Basophils, Absolute 0.04 10*3/mm3      Immature Grans, Absolute 0.18 10*3/mm3      nRBC 0.0 /100 WBC     STAT Lactic Acid, Reflex [[id number]]  (Normal) Collected: 12/26/22 1514    Specimen: Blood Updated: 12/26/22 1554     Lactate 1.8 mmol/L     [place] Draw [[id number]] Collected: 12/26/22 1145    Specimen: Blood Updated: 12/26/22 1545    Narrative:      The following orders were created for panel order [place] Draw.  Procedure                               Abnormality         Status                     ---------                               -----------         ------                     Green Top (Gel)[[id number]]                                  Final result               Lavender Top[[id number]]                                     Final result               Red Top[[id number]]                                          Final result               [place] Blood Culture Haresh...[[id number]]                      Final result               Singh Top[[id number]]                                         Final result               Light Blue Top[[id number]]                                   Final result                 Please view results for these tests on the individual orders.    Singh Top [[id number]] Collected: 12/26/22 1145    Specimen: Blood Updated: 12/26/22 1545     Extra Tube Hold for add-ons.     Comment: Auto resulted.       COVID PRE-OP / PRE-PROCEDURE SCREENING ORDER (NO ISOLATION) - Swab, Nasopharynx [[id number]]  (Normal) Collected: 12/26/22 1209    Specimen: Swab from Nasopharynx Updated: 12/26/22 1333    Narrative:       The following orders were created for panel order COVID PRE-OP / PRE-PROCEDURE SCREENING ORDER (NO ISOLATION) - Swab, Nasopharynx.  Procedure                               Abnormality         Status                     ---------                               -----------         ------                     COVID-19, FLU A/B, RSV P...[[id number]]  Normal              Final result                 Please view results for these tests on the individual orders.    COVID-19, FLU A/B, RSV PCR - Swab, Nasopharynx [[id number]]  (Normal) Collected: 12/26/22 1209    Specimen: Swab from Nasopharynx Updated: 12/26/22 1333     COVID19 Not Detected     Influenza A PCR Not Detected     Influenza B PCR Not Detected     RSV, PCR Not Detected    Narrative:      Fact sheet for providers: https://www.fda.gov/media/184940/download    Fact sheet for patients: https://www.fda.gov/media/086623/download    Test performed by PCR.    Lactic Acid, Plasma [[id number]]  (Abnormal) Collected: 12/26/22 1145    Specimen: Blood Updated: 12/26/22 1319     Lactate 2.4 mmol/L     Troponin [[id number]]  (Normal) Collected: 12/26/22 1145    Specimen: Blood Updated: 12/26/22 1303     Troponin T <0.010 ng/mL     Narrative:      Troponin T Reference Range:  <= 0.03 ng/mL-   Negative for AMI  >0.03 ng/mL-     Abnormal for myocardial necrosis.  Clinicians would have to utilize clinical acumen, EKG, Troponin and serial changes to determine if it is an Acute Myocardial Infarction or myocardial injury due to an underlying chronic condition.       Results may be falsely decreased if patient taking Biotin.      [place] Blood Culture Bottle Set [[id number]] Collected: 12/26/22 1150    Specimen: Blood from Arm, Right Updated: 12/26/22 1300     Extra Tube Hold for add-ons.     Comment: Auto resulted.       Procalcitonin [[id number]]  (Normal) Collected: 12/26/22 1145    Specimen: Blood Updated: 12/26/22 1249     Procalcitonin 0.18 ng/mL     Narrative:      As a Marker for  "Sepsis (Non-Neonates):    1. <0.5 ng/mL represents a low risk of severe sepsis and/or septic shock.  2. >2 ng/mL represents a high risk of severe sepsis and/or septic shock.    As a Marker for Lower Respiratory Tract Infections that require antibiotic therapy:    PCT on Admission    Antibiotic Therapy       6-12 Hrs later    >0.5                Strongly Recommended  >0.25 - <0.5        Recommended   0.1 - 0.25          Discouraged              Remeasure/reassess PCT  <0.1                Strongly Discouraged     Remeasure/reassess PCT    As 28 day mortality risk marker: \"Change in Procalcitonin Result\" (>80% or <=80%) if Day 0 (or Day 1) and Day 4 values are available. Refer to http://www.[facility] Grouppct-calculator.com    Change in PCT <=80%  A decrease of PCT levels below or equal to 80% defines a positive change in PCT test result representing a higher risk for 28-day all-cause mortality of patients diagnosed with severe sepsis for septic shock.    Change in PCT >80%  A decrease of PCT levels of more than 80% defines a negative change in PCT result representing a lower risk for 28-day all-cause mortality of patients diagnosed with severe sepsis or septic shock.       Green Top (Gel) [[id number]] Collected: 12/26/22 1145    Specimen: Blood Updated: 12/26/22 1245     Extra Tube Hold for add-ons.     Comment: Auto resulted.       Light Blue Top [[id number]] Collected: 12/26/22 1145    Specimen: Blood Updated: 12/26/22 1245     Extra Tube Hold for add-ons.     Comment: Auto resulted       Red Top [[id number]] Collected: 12/26/22 1145    Specimen: Blood Updated: 12/26/22 1245     Extra Tube Hold for add-ons.     Comment: Auto resulted.       BNP [[id number]]  (Normal) Collected: 12/26/22 1145    Specimen: Blood Updated: 12/26/22 1240     proBNP 39.1 pg/mL     Narrative:      Among patients with dyspnea, NT-proBNP is highly sensitive for the detection of acute congestive heart failure. In addition NT-proBNP of <300 pg/ml " effectively rules out acute congestive heart failure with 99% negative predictive value.      Comprehensive Metabolic Panel [[id number]]  (Abnormal) Collected: 12/26/22 1145    Specimen: Blood Updated: 12/26/22 1239     Glucose 147 mg/dL      BUN 11 mg/dL      Creatinine 0.73 mg/dL      Sodium 135 mmol/L      Potassium 4.1 mmol/L      Chloride 97 mmol/L      CO2 27.0 mmol/L      Calcium 9.1 mg/dL      Total Protein 7.9 g/dL      Albumin 4.40 g/dL      ALT (SGPT) 32 U/L      AST (SGOT) 18 U/L      Alkaline Phosphatase 73 U/L      Total Bilirubin 1.3 mg/dL      Globulin 3.5 gm/dL      A/G Ratio 1.3 g/dL      BUN/Creatinine Ratio 15.1     Anion Gap 11.0 mmol/L      eGFR 115.1 mL/min/1.73      Comment: National Kidney Foundation and American Society of Nephrology (ASN) Task Force recommended calculation based on the Chronic Kidney Disease Epidemiology Collaboration (CKD-EPI) equation refit without adjustment for race.       Narrative:      GFR Normal >60  Chronic Kidney Disease <60  Kidney Failure <15      Protime-INR [[id number]]  (Normal) Collected: 12/26/22 1145    Specimen: Blood Updated: 12/26/22 1230     Protime 13.5 Seconds      INR 1.02    D-dimer, Quantitative [[id number]]  (Normal) Collected: 12/26/22 1145    Specimen: Blood Updated: 12/26/22 1230     D-Dimer, Quantitative 0.41 MCGFEU/mL     Narrative:      According to the assay 's published package insert, a normal (<0.50 MCGFEU/mL) D-dimer result in conjunction with a non-high clinical probability assessment, excludes deep vein thrombosis (DVT) and pulmonary embolism (PE) with high sensitivity.    D-dimer values increase with age and this can make VTE exclusion of an older population difficult. To address this, the American College of Physicians, based on best available evidence and recent guidelines, recommends that clinicians use age-adjusted D-dimer thresholds in patients greater than 50 years of age with: a) a low probability of PE who do not  "meet all Pulmonary Embolism Rule Out Criteria, or b) in those with intermediate probability of PE.   The formula for an age-adjusted D-dimer cut-off is \"age/100\".  For example, a 60 year old patient would have an age-adjusted cut-off of 0.60 MCGFEU/mL and an 80 year old 0.80 MCGFEU/mL.    CBC & Differential [[id number]]  (Abnormal) Collected: 12/26/22 1145    Specimen: Blood Updated: 12/26/22 1223    Narrative:      The following orders were created for panel order CBC & Differential.  Procedure                               Abnormality         Status                     ---------                               -----------         ------                     CBC Auto Differential[[id number]]        Abnormal            Final result                 Please view results for these tests on the individual orders.    CBC Auto Differential [[id number]]  (Abnormal) Collected: 12/26/22 1145    Specimen: Blood Updated: 12/26/22 1223     WBC 18.67 10*3/mm3      RBC 5.38 10*6/mm3      Hemoglobin 15.0 g/dL      Hematocrit 46.7 %      MCV 86.8 fL      MCH 27.9 pg      MCHC 32.1 g/dL      RDW 14.8 %      RDW-SD 46.9 fl      MPV 8.5 fL      Platelets 296 10*3/mm3      Neutrophil % 85.8 %      Lymphocyte % 4.1 %      Monocyte % 8.2 %      Eosinophil % 0.4 %      Basophil % 0.5 %      Immature Grans % 1.0 %      Neutrophils, Absolute 16.03 10*3/mm3      Lymphocytes, Absolute 0.76 10*3/mm3      Monocytes, Absolute 1.53 10*3/mm3      Eosinophils, Absolute 0.07 10*3/mm3      Basophils, Absolute 0.09 10*3/mm3      Immature Grans, Absolute 0.19 10*3/mm3      nRBC 0.0 /100 WBC     Lavender Top [[id number]] Collected: 12/26/22 1145    Specimen: Blood Updated: 12/26/22 1222     Extra Tube --    Blood Gas, Arterial - [[id number]]  (Abnormal) Collected: 12/26/22 1130    Specimen: Arterial Blood Updated: 12/26/22 1153     Site Right Radial     Bill's Test Positive     pH, Arterial 7.506 pH units      Comment: 83 Value above reference range        " "pCO2, Arterial 34.6 mm Hg      Comment: 84 Value below reference range        pO2, Arterial 53.8 mm Hg      Comment: 85 Value below critical limit        HCO3, Arterial 27.4 mmol/L      Comment: 83 Value above reference range        Base Excess, Arterial 4.5 mmol/L      Comment: 83 Value above reference range        O2 Saturation, Arterial 91.8 %      Comment: 84 Value below reference range        Temperature 37.0 C      Barometric Pressure for Blood Gas 754 mmHg      Modality Room Air     FIO2 21 %      Ventilator Mode NA     Notified Who DR SAUCEDA     Notified By [email or web address]     Notified Time 2022 11:58     [facility]     Comment: Meter: [id number]     :  [email or web address]        pCO2, Temperature Corrected 34.6 mm Hg      pH, Temp Corrected 7.506 pH Units      pO2, Temperature Corrected 53.8 mm Hg             Objective:     Vitals: /61 (BP Location: Left arm, Patient Position: Lying)   Pulse 98   Temp 99 °F (37.2 °C) (Oral)   Resp 16   Ht 177.8 cm (70\")   Wt (!) 161 kg (356 lb)   SpO2 99%   BMI 51.08 kg/m²      Intake/Output Summary (Last 24 hours) at 2022 0730  Last data filed at 2022 1827  Gross per 24 hour   Intake 1140 ml   Output --   Net 1140 ml    Temp (24hrs), Av.5 °F (36.9 °C), Min:97.9 °F (36.6 °C), Max:99.2 °F (37.3 °C)      Physical Exam  Constitutional:       Appearance: He is well-developed. He is obese.   HENT:      Head: Normocephalic and atraumatic.   Eyes:      Conjunctiva/sclera: Conjunctivae normal.      Pupils: Pupils are equal, round, and reactive to light.   Cardiovascular:      Rate and Rhythm: Normal rate and regular rhythm.      Heart sounds: Normal heart sounds. No murmur heard.    No friction rub.   Pulmonary:      Effort: Pulmonary effort is normal. No respiratory distress.      Breath sounds: Rhonchi (rll) present. No wheezing or rales.   Abdominal:      General: Bowel sounds are normal. There is no distension.      Palpations: Abdomen is " soft.      Tenderness: There is no abdominal tenderness.   Musculoskeletal:         General: Normal range of motion.      Cervical back: Normal range of motion.   Skin:     Capillary Refill: Capillary refill takes less than 2 seconds.   Neurological:      Mental Status: He is alert and oriented to person, place, and time.      Cranial Nerves: No cranial nerve deficit.   Psychiatric:         Behavior: Behavior normal.             Assessment and Plan:     Primary Problem:  Pneumonia due to infectious organism, unspecified laterality, unspecified part of lung    Hospital Problem list:    Pneumonia due to infectious organism, unspecified laterality, unspecified part of lung    Pneumonia of right lung due to infectious organism    Hypertension      PMH:  Past Medical History:   Diagnosis Date   • GERD (gastroesophageal reflux disease)    • Hyperlipidemia    • Hypertension        Treatment Plan:  Community-acquired pneumonia of right lower lobe  Seen on imaging of chest and has elevated white blood cell count.  CTA showing lobar pneumonia.  Continue Rocephin and azithromycin.  Wean oxygen as able and start aggressive pulmonary toilet.    Add Solu-Medrol at low-dose today given worsening hypoxia overnight     Acute respiratory failure with hypoxia  Patient hypoxic in the 80s requiring oxygen supplementation.  Now on 2 L nasal cannula.  Secondary to pneumonia.  Treat as above.    Hyperglycemia  Patient reports being prediabetic taking metformin at home.  Will check A1c and start sliding scale.  Restart home metformin     Hypertension  Resume home losartan     Hyperlipidemia  Resume home pravastatin    Disposition: Continue inpatient care, discharge home once off of oxygen    Reviewed treatment plans with the patient and/or family.   30 minutes spent in face to face interaction and coordination of care.     Electronically signed by Elvin Scott MD on 12/28/2022 at 07:30 CST    Electronically signed by Tyler  Elvin ELIAS MD at 12/28/22 0732       Consult Notes (last 48 hours)  Notes from 12/26/22 0946 through 12/28/22 0946   No notes of this type exist for this encounter.

## 2022-12-28 NOTE — PLAN OF CARE
Goal Outcome Evaluation:  Plan of Care Reviewed With: patient        Progress: no change  Outcome Evaluation: Patient denies pain, voiding, up ad uday, 4L O2 @ night, spouse to bring home bi pap, O2 sat low 90's, drops in 80's when sleeping, safety maintained.

## 2022-12-28 NOTE — PLAN OF CARE
Goal Outcome Evaluation:  Plan of Care Reviewed With: patient           Outcome Evaluation: Pt. up in chair most of the day. Steroids given. Accu check TID started today.  Abx given. 2L Nasal cannula; 91-92%. Dry congested cough. Voiding. Up ad uday. Lovenox. No complaints of pain. Spouse at bedside. Safety maintained.

## 2022-12-28 NOTE — PROGRESS NOTES
Daily Progress Note  Conner Almonte  MRN: [id number] LOS: 2    Admit Date: 12/26/2022 12/28/2022 07:30 CST    Subjective:          Chief Complaint:  Chief Complaint   Patient presents with   • Shortness of Breath       Interval History:    Reviewed overnight events and nursing notes.   Patient had multiple desats overnight.  He does wear BiPAP at home and did not bring it in.  They were able to get his oxygen up by titrating to 4 L.    Review of Systems   Constitutional: Negative for chills and fever.   HENT: Positive for congestion.    Respiratory: Positive for cough.        DIET:  Diet: Regular/House Diet; Texture: Regular Texture (IDDSI 7); Fluid Consistency: Thin (IDDSI 0)    Medications:      azithromycin, 500 mg, Intravenous, Q24H  cefTRIAXone, 1 g, Intravenous, Q24H  enoxaparin, 40 mg, Subcutaneous, Q12H  insulin lispro, 0-9 Units, Subcutaneous, TID AC  ipratropium-albuterol, 3 mL, Nebulization, TID - RT  losartan, 25 mg, Oral, Nightly  metFORMIN, 500 mg, Oral, BID With Meals  methylPREDNISolone sodium succinate, 40 mg, Intravenous, Q12H  pantoprazole, 40 mg, Oral, QAM  pravastatin, 40 mg, Oral, Nightly  sodium chloride, 10 mL, Intravenous, Q12H        Data:     Code Status:   Code Status and Medical Interventions:   Ordered at: 12/26/22 1717     Code Status (Patient has no pulse and is not breathing):    CPR (Attempt to Resuscitate)     Medical Interventions (Patient has pulse or is breathing):    Full Support       Family History   Problem Relation Age of Onset   • Colon cancer Father    • Colon polyps Neg Hx      Social History     Socioeconomic History   • Marital status:    Tobacco Use   • Smoking status: Former     Types: Cigarettes   • Smokeless tobacco: Never   Vaping Use   • Vaping Use: Never used   Substance and Sexual Activity   • Alcohol use: Yes     Comment: beer every weekend   • Drug use: No   • Sexual activity: Defer       Labs:    Lab Results (last 72 hours)     Procedure  Component Value Units Date/Time    Comprehensive Metabolic Panel [[id number]]  (Abnormal) Collected: 12/28/22 0637    Specimen: Blood Updated: 12/28/22 0714     Glucose 155 mg/dL      BUN 17 mg/dL      Creatinine 0.79 mg/dL      Sodium 141 mmol/L      Potassium 4.1 mmol/L      Chloride 103 mmol/L      CO2 26.0 mmol/L      Calcium 8.2 mg/dL      Total Protein 6.1 g/dL      Albumin 3.6 g/dL      ALT (SGPT) 23 U/L      AST (SGOT) 13 U/L      Alkaline Phosphatase 66 U/L      Total Bilirubin 0.4 mg/dL      Globulin 2.5 gm/dL      A/G Ratio 1.4 g/dL      BUN/Creatinine Ratio 21.5     Anion Gap 12.0 mmol/L      eGFR 112.3 mL/min/1.73      Comment: National Kidney Foundation and American Society of Nephrology (ASN) Task Force recommended calculation based on the Chronic Kidney Disease Epidemiology Collaboration (CKD-EPI) equation refit without adjustment for race.       Narrative:      GFR Normal >60  Chronic Kidney Disease <60  Kidney Failure <15      CBC & Differential [[id number]]  (Abnormal) Collected: 12/28/22 0637    Specimen: Blood Updated: 12/28/22 0652    Narrative:      The following orders were created for panel order CBC & Differential.  Procedure                               Abnormality         Status                     ---------                               -----------         ------                     CBC Auto Differential[[id number]]        Abnormal            Final result                 Please view results for these tests on the individual orders.    CBC Auto Differential [[id number]]  (Abnormal) Collected: 12/28/22 0637    Specimen: Blood Updated: 12/28/22 0652     WBC 14.41 10*3/mm3      RBC 4.73 10*6/mm3      Hemoglobin 13.5 g/dL      Hematocrit 41.4 %      MCV 87.5 fL      MCH 28.5 pg      MCHC 32.6 g/dL      RDW 14.8 %      RDW-SD 47.1 fl      MPV 8.8 fL      Platelets 266 10*3/mm3      Neutrophil % 81.3 %      Lymphocyte % 9.4 %      Monocyte % 7.5 %      Eosinophil % 0.7 %      Basophil % 0.3 %       Immature Grans % 0.8 %      Neutrophils, Absolute 11.70 10*3/mm3      Lymphocytes, Absolute 1.36 10*3/mm3      Monocytes, Absolute 1.08 10*3/mm3      Eosinophils, Absolute 0.10 10*3/mm3      Basophils, Absolute 0.05 10*3/mm3      Immature Grans, Absolute 0.12 10*3/mm3      nRBC 0.0 /100 WBC     Blood Culture - Blood, Arm, Right [[id number]]  (Normal) Collected: 12/26/22 1150    Specimen: Blood from Arm, Right Updated: 12/27/22 1215     Blood Culture No growth at 24 hours    Blood Culture - Blood, Arm, Left [[id number]]  (Normal) Collected: 12/26/22 1145    Specimen: Blood from Arm, Left Updated: 12/27/22 1215     Blood Culture No growth at 24 hours    Comprehensive Metabolic Panel [[id number]]  (Abnormal) Collected: 12/27/22 0433    Specimen: Blood Updated: 12/27/22 0540     Glucose 226 mg/dL      BUN 14 mg/dL      Creatinine 0.70 mg/dL      Sodium 137 mmol/L      Potassium 3.8 mmol/L      Chloride 99 mmol/L      CO2 30.0 mmol/L      Calcium 9.2 mg/dL      Total Protein 7.5 g/dL      Albumin 4.3 g/dL      ALT (SGPT) 29 U/L      AST (SGOT) 17 U/L      Alkaline Phosphatase 74 U/L      Total Bilirubin 0.5 mg/dL      Globulin 3.2 gm/dL      A/G Ratio 1.3 g/dL      BUN/Creatinine Ratio 20.0     Anion Gap 8.0 mmol/L      eGFR 116.5 mL/min/1.73      Comment: National Kidney Foundation and American Society of Nephrology (ASN) Task Force recommended calculation based on the Chronic Kidney Disease Epidemiology Collaboration (CKD-EPI) equation refit without adjustment for race.       Narrative:      GFR Normal >60  Chronic Kidney Disease <60  Kidney Failure <15      CBC Auto Differential [[id number]]  (Abnormal) Collected: 12/27/22 0433    Specimen: Blood Updated: 12/27/22 0519     WBC 19.86 10*3/mm3      RBC 5.21 10*6/mm3      Hemoglobin 14.7 g/dL      Hematocrit 45.7 %      MCV 87.7 fL      MCH 28.2 pg      MCHC 32.2 g/dL      RDW 14.7 %      RDW-SD 47.6 fl      MPV 8.7 fL      Platelets 309 10*3/mm3      Neutrophil % 89.5 %       Lymphocyte % 4.1 %      Monocyte % 5.1 %      Eosinophil % 0.2 %      Basophil % 0.2 %      Immature Grans % 0.9 %      Neutrophils, Absolute 17.77 10*3/mm3      Lymphocytes, Absolute 0.82 10*3/mm3      Monocytes, Absolute 1.02 10*3/mm3      Eosinophils, Absolute 0.03 10*3/mm3      Basophils, Absolute 0.04 10*3/mm3      Immature Grans, Absolute 0.18 10*3/mm3      nRBC 0.0 /100 WBC     STAT Lactic Acid, Reflex [[id number]]  (Normal) Collected: 12/26/22 1514    Specimen: Blood Updated: 12/26/22 1554     Lactate 1.8 mmol/L     Coden Draw [[id number]] Collected: 12/26/22 1145    Specimen: Blood Updated: 12/26/22 1545    Narrative:      The following orders were created for panel order Coden Draw.  Procedure                               Abnormality         Status                     ---------                               -----------         ------                     Green Top (Gel)[[id number]]                                  Final result               Lavender Top[[id number]]                                     Final result               Red Top[[id number]]                                          Final result               Coden Blood Culture Haresh...[[id number]]                      Final result               Singh Top[[id number]]                                         Final result               Light Blue Top[[id number]]                                   Final result                 Please view results for these tests on the individual orders.    Singh Top [[id number]] Collected: 12/26/22 1145    Specimen: Blood Updated: 12/26/22 1545     Extra Tube Hold for add-ons.     Comment: Auto resulted.       COVID PRE-OP / PRE-PROCEDURE SCREENING ORDER (NO ISOLATION) - Swab, Nasopharynx [[id number]]  (Normal) Collected: 12/26/22 1209    Specimen: Swab from Nasopharynx Updated: 12/26/22 1333    Narrative:      The following orders were created for panel order COVID PRE-OP / PRE-PROCEDURE SCREENING ORDER (NO ISOLATION) - Swab,  Nasopharynx.  Procedure                               Abnormality         Status                     ---------                               -----------         ------                     COVID-19, FLU A/B, RSV P...[[id number]]  Normal              Final result                 Please view results for these tests on the individual orders.    COVID-19, FLU A/B, RSV PCR - Swab, Nasopharynx [[id number]]  (Normal) Collected: 12/26/22 1209    Specimen: Swab from Nasopharynx Updated: 12/26/22 1333     COVID19 Not Detected     Influenza A PCR Not Detected     Influenza B PCR Not Detected     RSV, PCR Not Detected    Narrative:      Fact sheet for providers: https://www.fda.gov/media/176263/download    Fact sheet for patients: https://www.fda.gov/media/321669/download    Test performed by PCR.    Lactic Acid, Plasma [[id number]]  (Abnormal) Collected: 12/26/22 1145    Specimen: Blood Updated: 12/26/22 1319     Lactate 2.4 mmol/L     Troponin [[id number]]  (Normal) Collected: 12/26/22 1145    Specimen: Blood Updated: 12/26/22 1303     Troponin T <0.010 ng/mL     Narrative:      Troponin T Reference Range:  <= 0.03 ng/mL-   Negative for AMI  >0.03 ng/mL-     Abnormal for myocardial necrosis.  Clinicians would have to utilize clinical acumen, EKG, Troponin and serial changes to determine if it is an Acute Myocardial Infarction or myocardial injury due to an underlying chronic condition.       Results may be falsely decreased if patient taking Biotin.      XMPie Blood Culture Bottle Set [[id number]] Collected: 12/26/22 1150    Specimen: Blood from Arm, Right Updated: 12/26/22 1300     Extra Tube Hold for add-ons.     Comment: Auto resulted.       Procalcitonin [[id number]]  (Normal) Collected: 12/26/22 1145    Specimen: Blood Updated: 12/26/22 1249     Procalcitonin 0.18 ng/mL     Narrative:      As a Marker for Sepsis (Non-Neonates):    1. <0.5 ng/mL represents a low risk of severe sepsis and/or septic shock.  2. >2 ng/mL  "represents a high risk of severe sepsis and/or septic shock.    As a Marker for Lower Respiratory Tract Infections that require antibiotic therapy:    PCT on Admission    Antibiotic Therapy       6-12 Hrs later    >0.5                Strongly Recommended  >0.25 - <0.5        Recommended   0.1 - 0.25          Discouraged              Remeasure/reassess PCT  <0.1                Strongly Discouraged     Remeasure/reassess PCT    As 28 day mortality risk marker: \"Change in Procalcitonin Result\" (>80% or <=80%) if Day 0 (or Day 1) and Day 4 values are available. Refer to http://www.[email or web address]pct-calculator.com    Change in PCT <=80%  A decrease of PCT levels below or equal to 80% defines a positive change in PCT test result representing a higher risk for 28-day all-cause mortality of patients diagnosed with severe sepsis for septic shock.    Change in PCT >80%  A decrease of PCT levels of more than 80% defines a negative change in PCT result representing a lower risk for 28-day all-cause mortality of patients diagnosed with severe sepsis or septic shock.       Green Top (Gel) [[id number]] Collected: 12/26/22 1145    Specimen: Blood Updated: 12/26/22 1245     Extra Tube Hold for add-ons.     Comment: Auto resulted.       Light Blue Top [[id number]] Collected: 12/26/22 1145    Specimen: Blood Updated: 12/26/22 1245     Extra Tube Hold for add-ons.     Comment: Auto resulted       Red Top [[id number]] Collected: 12/26/22 1145    Specimen: Blood Updated: 12/26/22 1245     Extra Tube Hold for add-ons.     Comment: Auto resulted.       BNP [[id number]]  (Normal) Collected: 12/26/22 1145    Specimen: Blood Updated: 12/26/22 1240     proBNP 39.1 pg/mL     Narrative:      Among patients with dyspnea, NT-proBNP is highly sensitive for the detection of acute congestive heart failure. In addition NT-proBNP of <300 pg/ml effectively rules out acute congestive heart failure with 99% negative predictive value.      Comprehensive Metabolic " Panel [[id number]]  (Abnormal) Collected: 12/26/22 1145    Specimen: Blood Updated: 12/26/22 1239     Glucose 147 mg/dL      BUN 11 mg/dL      Creatinine 0.73 mg/dL      Sodium 135 mmol/L      Potassium 4.1 mmol/L      Chloride 97 mmol/L      CO2 27.0 mmol/L      Calcium 9.1 mg/dL      Total Protein 7.9 g/dL      Albumin 4.40 g/dL      ALT (SGPT) 32 U/L      AST (SGOT) 18 U/L      Alkaline Phosphatase 73 U/L      Total Bilirubin 1.3 mg/dL      Globulin 3.5 gm/dL      A/G Ratio 1.3 g/dL      BUN/Creatinine Ratio 15.1     Anion Gap 11.0 mmol/L      eGFR 115.1 mL/min/1.73      Comment: National Kidney Foundation and American Society of Nephrology (ASN) Task Force recommended calculation based on the Chronic Kidney Disease Epidemiology Collaboration (CKD-EPI) equation refit without adjustment for race.       Narrative:      GFR Normal >60  Chronic Kidney Disease <60  Kidney Failure <15      Protime-INR [[id number]]  (Normal) Collected: 12/26/22 1145    Specimen: Blood Updated: 12/26/22 1230     Protime 13.5 Seconds      INR 1.02    D-dimer, Quantitative [[id number]]  (Normal) Collected: 12/26/22 1145    Specimen: Blood Updated: 12/26/22 1230     D-Dimer, Quantitative 0.41 MCGFEU/mL     Narrative:      According to the assay 's published package insert, a normal (<0.50 MCGFEU/mL) D-dimer result in conjunction with a non-high clinical probability assessment, excludes deep vein thrombosis (DVT) and pulmonary embolism (PE) with high sensitivity.    D-dimer values increase with age and this can make VTE exclusion of an older population difficult. To address this, the American College of Phys[employer], based on best available evidence and recent guidelines, recommends that clinicians use age-adjusted D-dimer thresholds in patients greater than 50 years of age with: a) a low probability of PE who do not meet all Pulmonary Embolism Rule Out Criteria, or b) in those with intermediate probability of PE.   The formula for an  "age-adjusted D-dimer cut-off is \"age/100\".  For example, a 60 year old patient would have an age-adjusted cut-off of 0.60 MCGFEU/mL and an 80 year old 0.80 MCGFEU/mL.    CBC & Differential [[id number]]  (Abnormal) Collected: 12/26/22 1145    Specimen: Blood Updated: 12/26/22 1223    Narrative:      The following orders were created for panel order CBC & Differential.  Procedure                               Abnormality         Status                     ---------                               -----------         ------                     CBC Auto Differential[[id number]]        Abnormal            Final result                 Please view results for these tests on the individual orders.    CBC Auto Differential [[id number]]  (Abnormal) Collected: 12/26/22 1145    Specimen: Blood Updated: 12/26/22 1223     WBC 18.67 10*3/mm3      RBC 5.38 10*6/mm3      Hemoglobin 15.0 g/dL      Hematocrit 46.7 %      MCV 86.8 fL      MCH 27.9 pg      MCHC 32.1 g/dL      RDW 14.8 %      RDW-SD 46.9 fl      MPV 8.5 fL      Platelets 296 10*3/mm3      Neutrophil % 85.8 %      Lymphocyte % 4.1 %      Monocyte % 8.2 %      Eosinophil % 0.4 %      Basophil % 0.5 %      Immature Grans % 1.0 %      Neutrophils, Absolute 16.03 10*3/mm3      Lymphocytes, Absolute 0.76 10*3/mm3      Monocytes, Absolute 1.53 10*3/mm3      Eosinophils, Absolute 0.07 10*3/mm3      Basophils, Absolute 0.09 10*3/mm3      Immature Grans, Absolute 0.19 10*3/mm3      nRBC 0.0 /100 WBC     Lavender Top [[id number]] Collected: 12/26/22 1145    Specimen: Blood Updated: 12/26/22 1222     Extra Tube --    Blood Gas, Arterial - [[id number]]  (Abnormal) Collected: 12/26/22 1130    Specimen: Arterial Blood Updated: 12/26/22 1153     Site Right Radial     Bill's Test Positive     pH, Arterial 7.506 pH units      Comment: 83 Value above reference range        pCO2, Arterial 34.6 mm Hg      Comment: 84 Value below reference range        pO2, Arterial 53.8 mm Hg      Comment: 85 " "Value below critical limit        HCO3, Arterial 27.4 mmol/L      Comment: 83 Value above reference range        Base Excess, Arterial 4.5 mmol/L      Comment: 83 Value above reference range        O2 Saturation, Arterial 91.8 %      Comment: 84 Value below reference range        Temperature 37.0 C      Barometric Pressure for Blood Gas 754 mmHg      Modality Room Air     FIO2 21 %      Ventilator Mode NA     Notified Who DR SAUCEDA     Notified By [email or web address]     Notified Time 2022 11:58     [facility]     Comment: Meter: [id number]     :  [email or web address]        pCO2, Temperature Corrected 34.6 mm Hg      pH, Temp Corrected 7.506 pH Units      pO2, Temperature Corrected 53.8 mm Hg             Objective:     Vitals: /61 (BP Location: Left arm, Patient Position: Lying)   Pulse 98   Temp 99 °F (37.2 °C) (Oral)   Resp 16   Ht 177.8 cm (70\")   Wt (!) 161 kg (356 lb)   SpO2 99%   BMI 51.08 kg/m²      Intake/Output Summary (Last 24 hours) at 2022 0730  Last data filed at 2022 1827  Gross per 24 hour   Intake 1140 ml   Output --   Net 1140 ml    Temp (24hrs), Av.5 °F (36.9 °C), Min:97.9 °F (36.6 °C), Max:99.2 °F (37.3 °C)      Physical Exam  Constitutional:       Appearance: He is well-developed. He is obese.   HENT:      Head: Normocephalic and atraumatic.   Eyes:      Conjunctiva/sclera: Conjunctivae normal.      Pupils: Pupils are equal, round, and reactive to light.   Cardiovascular:      Rate and Rhythm: Normal rate and regular rhythm.      Heart sounds: Normal heart sounds. No murmur heard.    No friction rub.   Pulmonary:      Effort: Pulmonary effort is normal. No respiratory distress.      Breath sounds: Rhonchi (rll) present. No wheezing or rales.   Abdominal:      General: Bowel sounds are normal. There is no distension.      Palpations: Abdomen is soft.      Tenderness: There is no abdominal tenderness.   Musculoskeletal:         General: Normal range of motion.     "  Cervical back: Normal range of motion.   Skin:     Capillary Refill: Capillary refill takes less than 2 seconds.   Neurological:      Mental Status: He is alert and oriented to person, place, and time.      Cranial Nerves: No cranial nerve deficit.   Psychiatric:         Behavior: Behavior normal.             Assessment and Plan:     Primary Problem:  Pneumonia due to infectious organism, unspecified laterality, unspecified part of lung    Hospital Problem list:    Pneumonia due to infectious organism, unspecified laterality, unspecified part of lung    Pneumonia of right lung due to infectious organism    Hypertension      PMH:  Past Medical History:   Diagnosis Date   • GERD (gastroesophageal reflux disease)    • Hyperlipidemia    • Hypertension        Treatment Plan:  Community-acquired pneumonia of right lower lobe  Seen on imaging of chest and has elevated white blood cell count.  CTA showing lobar pneumonia.  Continue Rocephin and azithromycin.  Wean oxygen as able and start aggressive pulmonary toilet.    Add Solu-Medrol at low-dose today given worsening hypoxia overnight     Acute respiratory failure with hypoxia  Patient hypoxic in the 80s requiring oxygen supplementation.  Now on 2 L nasal cannula.  Secondary to pneumonia.  Treat as above.    Hyperglycemia  Patient reports being prediabetic taking metformin at home.  Will check A1c and start sliding scale.  Restart home metformin     Hypertension  Resume home losartan     Hyperlipidemia  Resume home pravastatin    Disposition: Continue inpatient care, discharge home once off of oxygen    Reviewed treatment plans with the patient and/or family.   30 minutes spent in face to face interaction and coordination of care.     Electronically signed by Elvin Scott MD on 12/28/2022 at 07:30 CST

## 2022-12-29 LAB
ALBUMIN SERPL-MCNC: 4 G/DL (ref 3.5–5.2)
ALBUMIN/GLOB SERPL: 1.3 G/DL
ALP SERPL-CCNC: 73 U/L (ref 39–117)
ALT SERPL W P-5'-P-CCNC: 25 U/L (ref 1–41)
ANION GAP SERPL CALCULATED.3IONS-SCNC: 10 MMOL/L (ref 5–15)
AST SERPL-CCNC: 16 U/L (ref 1–40)
BILIRUB SERPL-MCNC: 0.4 MG/DL (ref 0–1.2)
BUN SERPL-MCNC: 12 MG/DL (ref 6–20)
BUN/CREAT SERPL: 20.3 (ref 7–25)
CALCIUM SPEC-SCNC: 8.8 MG/DL (ref 8.6–10.5)
CHLORIDE SERPL-SCNC: 104 MMOL/L (ref 98–107)
CO2 SERPL-SCNC: 27 MMOL/L (ref 22–29)
CREAT SERPL-MCNC: 0.59 MG/DL (ref 0.76–1.27)
DEPRECATED RDW RBC AUTO: 46.5 FL (ref 37–54)
EGFRCR SERPLBLD CKD-EPI 2021: 122.7 ML/MIN/1.73
ERYTHROCYTE [DISTWIDTH] IN BLOOD BY AUTOMATED COUNT: 14.6 % (ref 12.3–15.4)
GLOBULIN UR ELPH-MCNC: 3.2 GM/DL
GLUCOSE BLDC GLUCOMTR-MCNC: 177 MG/DL (ref 70–130)
GLUCOSE BLDC GLUCOMTR-MCNC: 197 MG/DL (ref 70–130)
GLUCOSE BLDC GLUCOMTR-MCNC: 207 MG/DL (ref 70–130)
GLUCOSE SERPL-MCNC: 174 MG/DL (ref 65–99)
HCT VFR BLD AUTO: 44.2 % (ref 37.5–51)
HGB BLD-MCNC: 14.1 G/DL (ref 13–17.7)
MCH RBC QN AUTO: 28.3 PG (ref 26.6–33)
MCHC RBC AUTO-ENTMCNC: 31.9 G/DL (ref 31.5–35.7)
MCV RBC AUTO: 88.6 FL (ref 79–97)
PLATELET # BLD AUTO: 343 10*3/MM3 (ref 140–450)
PMV BLD AUTO: 8.9 FL (ref 6–12)
POTASSIUM SERPL-SCNC: 4.4 MMOL/L (ref 3.5–5.2)
PROT SERPL-MCNC: 7.2 G/DL (ref 6–8.5)
RBC # BLD AUTO: 4.99 10*6/MM3 (ref 4.14–5.8)
SODIUM SERPL-SCNC: 141 MMOL/L (ref 136–145)
WBC NRBC COR # BLD: 16.92 10*3/MM3 (ref 3.4–10.8)

## 2022-12-29 PROCEDURE — 94664 DEMO&/EVAL PT USE INHALER: CPT

## 2022-12-29 PROCEDURE — 80053 COMPREHEN METABOLIC PANEL: CPT | Performed by: FAMILY MEDICINE

## 2022-12-29 PROCEDURE — 82962 GLUCOSE BLOOD TEST: CPT

## 2022-12-29 PROCEDURE — 85027 COMPLETE CBC AUTOMATED: CPT | Performed by: FAMILY MEDICINE

## 2022-12-29 PROCEDURE — 25010000002 AZITHROMYCIN PER 500 MG: Performed by: FAMILY MEDICINE

## 2022-12-29 PROCEDURE — 63710000001 INSULIN LISPRO (HUMAN) PER 5 UNITS: Performed by: FAMILY MEDICINE

## 2022-12-29 PROCEDURE — 94799 UNLISTED PULMONARY SVC/PX: CPT

## 2022-12-29 PROCEDURE — 25010000002 CEFTRIAXONE PER 250 MG: Performed by: FAMILY MEDICINE

## 2022-12-29 PROCEDURE — 25010000002 METHYLPREDNISOLONE PER 40 MG: Performed by: FAMILY MEDICINE

## 2022-12-29 PROCEDURE — 25010000002 ENOXAPARIN PER 10 MG: Performed by: FAMILY MEDICINE

## 2022-12-29 RX ADMIN — METFORMIN HYDROCHLORIDE 500 MG: 500 TABLET ORAL at 09:32

## 2022-12-29 RX ADMIN — INSULIN LISPRO 2 UNITS: 100 INJECTION, SOLUTION INTRAVENOUS; SUBCUTANEOUS at 12:24

## 2022-12-29 RX ADMIN — IPRATROPIUM BROMIDE AND ALBUTEROL SULFATE 3 ML: .5; 3 SOLUTION RESPIRATORY (INHALATION) at 14:11

## 2022-12-29 RX ADMIN — ENOXAPARIN SODIUM 40 MG: 100 INJECTION SUBCUTANEOUS at 17:37

## 2022-12-29 RX ADMIN — PRAVASTATIN SODIUM 40 MG: 20 TABLET ORAL at 20:03

## 2022-12-29 RX ADMIN — Medication 10 ML: at 08:15

## 2022-12-29 RX ADMIN — Medication 10 ML: at 20:03

## 2022-12-29 RX ADMIN — METHYLPREDNISOLONE SODIUM SUCCINATE 40 MG: 40 INJECTION, POWDER, FOR SOLUTION INTRAMUSCULAR; INTRAVENOUS at 20:03

## 2022-12-29 RX ADMIN — CEFTRIAXONE 1 G: 1 INJECTION, POWDER, FOR SOLUTION INTRAMUSCULAR; INTRAVENOUS at 16:05

## 2022-12-29 RX ADMIN — IPRATROPIUM BROMIDE AND ALBUTEROL SULFATE 3 ML: .5; 3 SOLUTION RESPIRATORY (INHALATION) at 19:37

## 2022-12-29 RX ADMIN — ENOXAPARIN SODIUM 40 MG: 100 INJECTION SUBCUTANEOUS at 06:13

## 2022-12-29 RX ADMIN — LOSARTAN POTASSIUM 100 MG: 50 TABLET, FILM COATED ORAL at 20:03

## 2022-12-29 RX ADMIN — INSULIN LISPRO 2 UNITS: 100 INJECTION, SOLUTION INTRAVENOUS; SUBCUTANEOUS at 08:31

## 2022-12-29 RX ADMIN — AZITHROMYCIN DIHYDRATE 500 MG: 500 INJECTION, POWDER, LYOPHILIZED, FOR SOLUTION INTRAVENOUS at 17:35

## 2022-12-29 RX ADMIN — INSULIN LISPRO 4 UNITS: 100 INJECTION, SOLUTION INTRAVENOUS; SUBCUTANEOUS at 16:36

## 2022-12-29 RX ADMIN — METFORMIN HYDROCHLORIDE 500 MG: 500 TABLET ORAL at 18:01

## 2022-12-29 RX ADMIN — METHYLPREDNISOLONE SODIUM SUCCINATE 40 MG: 40 INJECTION, POWDER, FOR SOLUTION INTRAMUSCULAR; INTRAVENOUS at 08:16

## 2022-12-29 RX ADMIN — PANTOPRAZOLE SODIUM 40 MG: 40 TABLET, DELAYED RELEASE ORAL at 06:13

## 2022-12-29 NOTE — PROGRESS NOTES
Daily Progress Note  Conner Almonte  MRN: [id number] LOS: 3    Admit Date: 12/26/2022 12/29/2022 08:17 CST    Subjective:          Chief Complaint:  Chief Complaint   Patient presents with   • Shortness of Breath       Interval History:    Reviewed overnight events and nursing notes.   Patient had multiple desats overnight, improved somewhat with home bipap. Still requiring oxygen. Anticipate home tomorrow.    Review of Systems   Constitutional: Negative for chills and fever.   HENT: Positive for congestion.    Respiratory: Positive for cough.        DIET:  Diet: Regular/House Diet; Texture: Regular Texture (IDDSI 7); Fluid Consistency: Thin (IDDSI 0)    Medications:      azithromycin, 500 mg, Intravenous, Q24H  cefTRIAXone, 1 g, Intravenous, Q24H  enoxaparin, 40 mg, Subcutaneous, Q12H  insulin lispro, 0-9 Units, Subcutaneous, TID AC  ipratropium-albuterol, 3 mL, Nebulization, TID - RT  losartan, 100 mg, Oral, Nightly  metFORMIN, 500 mg, Oral, BID With Meals  methylPREDNISolone sodium succinate, 40 mg, Intravenous, Q12H  pantoprazole, 40 mg, Oral, QAM  pravastatin, 40 mg, Oral, Nightly  sodium chloride, 10 mL, Intravenous, Q12H        Data:     Code Status:   Code Status and Medical Interventions:   Ordered at: 12/26/22 1717     Code Status (Patient has no pulse and is not breathing):    CPR (Attempt to Resuscitate)     Medical Interventions (Patient has pulse or is breathing):    Full Support       Family History   Problem Relation Age of Onset   • Colon cancer Father    • Colon polyps Neg Hx      Social History     Socioeconomic History   • Marital status:    Tobacco Use   • Smoking status: Former     Types: Cigarettes   • Smokeless tobacco: Never   Vaping Use   • Vaping Use: Never used   Substance and Sexual Activity   • Alcohol use: Yes     Comment: beer every weekend   • Drug use: No   • Sexual activity: Defer       Labs:      Lab Results (last 72 hours)     Procedure Component Value Units Date/Time     POC Glucose Once [[id number]]  (Abnormal) Collected: 12/28/22 1651    Specimen: Blood Updated: 12/28/22 1702     Glucose 264 mg/dL      Comment: : [id number] Everardo AllyMeter ID: [id number]       Blood Culture - Blood, Arm, Right [[id number]]  (Normal) Collected: 12/26/22 1150    Specimen: Blood from Arm, Right Updated: 12/28/22 1216     Blood Culture No growth at 2 days    Blood Culture - Blood, Arm, Left [[id number]]  (Normal) Collected: 12/26/22 1145    Specimen: Blood from Arm, Left Updated: 12/28/22 1216     Blood Culture No growth at 2 days    POC Glucose Once [[id number]]  (Abnormal) Collected: 12/28/22 1118    Specimen: Blood Updated: 12/28/22 1130     Glucose 213 mg/dL      Comment: : [id number] Everardo AllyMeter ID: [id number]       POC Glucose Once [[id number]]  (Abnormal) Collected: 12/28/22 0805    Specimen: Blood Updated: 12/28/22 0816     Glucose 141 mg/dL      Comment: : [id number] Everardo AllyMeter ID: [id number]       Hemoglobin A1c [[id number]]  (Abnormal) Collected: 12/28/22 0637    Specimen: Blood Updated: 12/28/22 0750     Hemoglobin A1C 6.20 %     Narrative:      Hemoglobin A1C Ranges:    Increased Risk for Diabetes  5.7% to 6.4%  Diabetes                     >= 6.5%  Diabetic Goal                < 7.0%    Comprehensive Metabolic Panel [[id number]]  (Abnormal) Collected: 12/28/22 0637    Specimen: Blood Updated: 12/28/22 0714     Glucose 155 mg/dL      BUN 17 mg/dL      Creatinine 0.79 mg/dL      Sodium 141 mmol/L      Potassium 4.1 mmol/L      Chloride 103 mmol/L      CO2 26.0 mmol/L      Calcium 8.2 mg/dL      Total Protein 6.1 g/dL      Albumin 3.6 g/dL      ALT (SGPT) 23 U/L      AST (SGOT) 13 U/L      Alkaline Phosphatase 66 U/L      Total Bilirubin 0.4 mg/dL      Globulin 2.5 gm/dL      A/G Ratio 1.4 g/dL      BUN/Creatinine Ratio 21.5     Anion Gap 12.0 mmol/L      eGFR 112.3 mL/min/1.73      Comment: National Kidney Foundation and American Society of Nephrology (ASN) Task Force  recommended calculation based on the Chronic Kidney Disease Epidemiology Collaboration (CKD-EPI) equation refit without adjustment for race.       Narrative:      GFR Normal >60  Chronic Kidney Disease <60  Kidney Failure <15      CBC & Differential [[id number]]  (Abnormal) Collected: 12/28/22 0637    Specimen: Blood Updated: 12/28/22 0652    Narrative:      The following orders were created for panel order CBC & Differential.  Procedure                               Abnormality         Status                     ---------                               -----------         ------                     CBC Auto Differential[[id number]]        Abnormal            Final result                 Please view results for these tests on the individual orders.    CBC Auto Differential [[id number]]  (Abnormal) Collected: 12/28/22 0637    Specimen: Blood Updated: 12/28/22 0652     WBC 14.41 10*3/mm3      RBC 4.73 10*6/mm3      Hemoglobin 13.5 g/dL      Hematocrit 41.4 %      MCV 87.5 fL      MCH 28.5 pg      MCHC 32.6 g/dL      RDW 14.8 %      RDW-SD 47.1 fl      MPV 8.8 fL      Platelets 266 10*3/mm3      Neutrophil % 81.3 %      Lymphocyte % 9.4 %      Monocyte % 7.5 %      Eosinophil % 0.7 %      Basophil % 0.3 %      Immature Grans % 0.8 %      Neutrophils, Absolute 11.70 10*3/mm3      Lymphocytes, Absolute 1.36 10*3/mm3      Monocytes, Absolute 1.08 10*3/mm3      Eosinophils, Absolute 0.10 10*3/mm3      Basophils, Absolute 0.05 10*3/mm3      Immature Grans, Absolute 0.12 10*3/mm3      nRBC 0.0 /100 WBC     Comprehensive Metabolic Panel [[id number]]  (Abnormal) Collected: 12/27/22 0433    Specimen: Blood Updated: 12/27/22 0540     Glucose 226 mg/dL      BUN 14 mg/dL      Creatinine 0.70 mg/dL      Sodium 137 mmol/L      Potassium 3.8 mmol/L      Chloride 99 mmol/L      CO2 30.0 mmol/L      Calcium 9.2 mg/dL      Total Protein 7.5 g/dL      Albumin 4.3 g/dL      ALT (SGPT) 29 U/L      AST (SGOT) 17 U/L      Alkaline Phosphatase 74  U/L      Total Bilirubin 0.5 mg/dL      Globulin 3.2 gm/dL      A/G Ratio 1.3 g/dL      BUN/Creatinine Ratio 20.0     Anion Gap 8.0 mmol/L      eGFR 116.5 mL/min/1.73      Comment: National Kidney Foundation and American Society of Nephrology (ASN) Task Force recommended calculation based on the Chronic Kidney Disease Epidemiology Collaboration (CKD-EPI) equation refit without adjustment for race.       Narrative:      GFR Normal >60  Chronic Kidney Disease <60  Kidney Failure <15      CBC Auto Differential [[id number]]  (Abnormal) Collected: 12/27/22 0433    Specimen: Blood Updated: 12/27/22 0519     WBC 19.86 10*3/mm3      RBC 5.21 10*6/mm3      Hemoglobin 14.7 g/dL      Hematocrit 45.7 %      MCV 87.7 fL      MCH 28.2 pg      MCHC 32.2 g/dL      RDW 14.7 %      RDW-SD 47.6 fl      MPV 8.7 fL      Platelets 309 10*3/mm3      Neutrophil % 89.5 %      Lymphocyte % 4.1 %      Monocyte % 5.1 %      Eosinophil % 0.2 %      Basophil % 0.2 %      Immature Grans % 0.9 %      Neutrophils, Absolute 17.77 10*3/mm3      Lymphocytes, Absolute 0.82 10*3/mm3      Monocytes, Absolute 1.02 10*3/mm3      Eosinophils, Absolute 0.03 10*3/mm3      Basophils, Absolute 0.04 10*3/mm3      Immature Grans, Absolute 0.18 10*3/mm3      nRBC 0.0 /100 WBC     STAT Lactic Acid, Reflex [[id number]]  (Normal) Collected: 12/26/22 1514    Specimen: Blood Updated: 12/26/22 1554     Lactate 1.8 mmol/L     [place] Draw [[id number]] Collected: 12/26/22 1145    Specimen: Blood Updated: 12/26/22 1545    Narrative:      The following orders were created for panel order [place] Draw.  Procedure                               Abnormality         Status                     ---------                               -----------         ------                     Green Top (Gel)[[id number]]                                  Final result               Lavender Top[[id number]]                                     Final result               Red Top[[id number]]                                           Final result               [place] Blood Culture Haresh...[[id number]]                      Final result               Singh Top[[id number]]                                         Final result               Light Blue Top[[id number]]                                   Final result                 Please view results for these tests on the individual orders.    Singh Top [[id number]] Collected: 12/26/22 1145    Specimen: Blood Updated: 12/26/22 1545     Extra Tube Hold for add-ons.     Comment: Auto resulted.       COVID PRE-OP / PRE-PROCEDURE SCREENING ORDER (NO ISOLATION) - Swab, Nasopharynx [[id number]]  (Normal) Collected: 12/26/22 1209    Specimen: Swab from Nasopharynx Updated: 12/26/22 1333    Narrative:      The following orders were created for panel order COVID PRE-OP / PRE-PROCEDURE SCREENING ORDER (NO ISOLATION) - Swab, Nasopharynx.  Procedure                               Abnormality         Status                     ---------                               -----------         ------                     COVID-19, FLU A/B, RSV P...[[id number]]  Normal              Final result                 Please view results for these tests on the individual orders.    COVID-19, FLU A/B, RSV PCR - Swab, Nasopharynx [[id number]]  (Normal) Collected: 12/26/22 1209    Specimen: Swab from Nasopharynx Updated: 12/26/22 1333     COVID19 Not Detected     Influenza A PCR Not Detected     Influenza B PCR Not Detected     RSV, PCR Not Detected    Narrative:      Fact sheet for providers: https://www.fda.gov/media/379636/download    Fact sheet for patients: https://www.fda.gov/media/583011/download    Test performed by PCR.    Lactic Acid, Plasma [[id number]]  (Abnormal) Collected: 12/26/22 1145    Specimen: Blood Updated: 12/26/22 1319     Lactate 2.4 mmol/L     Troponin [[id number]]  (Normal) Collected: 12/26/22 1145    Specimen: Blood Updated: 12/26/22 1303     Troponin T <0.010 ng/mL     Narrative:      Troponin T  "Reference Range:  <= 0.03 ng/mL-   Negative for AMI  >0.03 ng/mL-     Abnormal for myocardial necrosis.  Clinicians would have to utilize clinical acumen, EKG, Troponin and serial changes to determine if it is an Acute Myocardial Infarction or myocardial injury due to an underlying chronic condition.       Results may be falsely decreased if patient taking Biotin.      [facility] Blood Culture Bottle Set [[id number]] Collected: 12/26/22 1150    Specimen: Blood from Arm, Right Updated: 12/26/22 1300     Extra Tube Hold for add-ons.     Comment: Auto resulted.       Procalcitonin [[id number]]  (Normal) Collected: 12/26/22 1145    Specimen: Blood Updated: 12/26/22 1249     Procalcitonin 0.18 ng/mL     Narrative:      As a Marker for Sepsis (Non-Neonates):    1. <0.5 ng/mL represents a low risk of severe sepsis and/or septic shock.  2. >2 ng/mL represents a high risk of severe sepsis and/or septic shock.    As a Marker for Lower Respiratory Tract Infections that require antibiotic therapy:    PCT on Admission    Antibiotic Therapy       6-12 Hrs later    >0.5                Strongly Recommended  >0.25 - <0.5        Recommended   0.1 - 0.25          Discouraged              Remeasure/reassess PCT  <0.1                Strongly Discouraged     Remeasure/reassess PCT    As 28 day mortality risk marker: \"Change in Procalcitonin Result\" (>80% or <=80%) if Day 0 (or Day 1) and Day 4 values are available. Refer to http://www.[employer]-pct-calculator.com    Change in PCT <=80%  A decrease of PCT levels below or equal to 80% defines a positive change in PCT test result representing a higher risk for 28-day all-cause mortality of patients diagnosed with severe sepsis for septic shock.    Change in PCT >80%  A decrease of PCT levels of more than 80% defines a negative change in PCT result representing a lower risk for 28-day all-cause mortality of patients diagnosed with severe sepsis or septic shock.       Green Top (Gel) [[id number]] " Collected: 12/26/22 1145    Specimen: Blood Updated: 12/26/22 1245     Extra Tube Hold for add-ons.     Comment: Auto resulted.       Light Blue Top [[id number]] Collected: 12/26/22 1145    Specimen: Blood Updated: 12/26/22 1245     Extra Tube Hold for add-ons.     Comment: Auto resulted       Red Top [[id number]] Collected: 12/26/22 1145    Specimen: Blood Updated: 12/26/22 1245     Extra Tube Hold for add-ons.     Comment: Auto resulted.       BNP [[id number]]  (Normal) Collected: 12/26/22 1145    Specimen: Blood Updated: 12/26/22 1240     proBNP 39.1 pg/mL     Narrative:      Among patients with dyspnea, NT-proBNP is highly sensitive for the detection of acute congestive heart failure. In addition NT-proBNP of <300 pg/ml effectively rules out acute congestive heart failure with 99% negative predictive value.      Comprehensive Metabolic Panel [[id number]]  (Abnormal) Collected: 12/26/22 1145    Specimen: Blood Updated: 12/26/22 1239     Glucose 147 mg/dL      BUN 11 mg/dL      Creatinine 0.73 mg/dL      Sodium 135 mmol/L      Potassium 4.1 mmol/L      Chloride 97 mmol/L      CO2 27.0 mmol/L      Calcium 9.1 mg/dL      Total Protein 7.9 g/dL      Albumin 4.40 g/dL      ALT (SGPT) 32 U/L      AST (SGOT) 18 U/L      Alkaline Phosphatase 73 U/L      Total Bilirubin 1.3 mg/dL      Globulin 3.5 gm/dL      A/G Ratio 1.3 g/dL      BUN/Creatinine Ratio 15.1     Anion Gap 11.0 mmol/L      eGFR 115.1 mL/min/1.73      Comment: National Kidney Foundation and American Society of Nephrology (ASN) Task Force recommended calculation based on the Chronic Kidney Disease Epidemiology Collaboration (CKD-EPI) equation refit without adjustment for race.       Narrative:      GFR Normal >60  Chronic Kidney Disease <60  Kidney Failure <15      Protime-INR [[id number]]  (Normal) Collected: 12/26/22 1145    Specimen: Blood Updated: 12/26/22 1230     Protime 13.5 Seconds      INR 1.02    D-dimer, Quantitative [[id number]]  (Normal) Collected:  "12/26/22 1145    Specimen: Blood Updated: 12/26/22 1230     D-Dimer, Quantitative 0.41 MCGFEU/mL     Narrative:      According to the assay 's published package insert, a normal (<0.50 MCGFEU/mL) D-dimer result in conjunction with a non-high clinical probability assessment, excludes deep vein thrombosis (DVT) and pulmonary embolism (PE) with high sensitivity.    D-dimer values increase with age and this can make VTE exclusion of an older population difficult. To address this, the American College of [employer], based on best available evidence and recent guidelines, recommends that clinicians use age-adjusted D-dimer thresholds in patients greater than 50 years of age with: a) a low probability of PE who do not meet all Pulmonary Embolism Rule Out Criteria, or b) in those with intermediate probability of PE.   The formula for an age-adjusted D-dimer cut-off is \"age/100\".  For example, a 60 year old patient would have an age-adjusted cut-off of 0.60 MCGFEU/mL and an 80 year old 0.80 MCGFEU/mL.    CBC & Differential [[id number]]  (Abnormal) Collected: 12/26/22 1145    Specimen: Blood Updated: 12/26/22 1223    Narrative:      The following orders were created for panel order CBC & Differential.  Procedure                               Abnormality         Status                     ---------                               -----------         ------                     CBC Auto Differential[[id number]]        Abnormal            Final result                 Please view results for these tests on the individual orders.    CBC Auto Differential [[id number]]  (Abnormal) Collected: 12/26/22 1145    Specimen: Blood Updated: 12/26/22 1223     WBC 18.67 10*3/mm3      RBC 5.38 10*6/mm3      Hemoglobin 15.0 g/dL      Hematocrit 46.7 %      MCV 86.8 fL      MCH 27.9 pg      MCHC 32.1 g/dL      RDW 14.8 %      RDW-SD 46.9 fl      MPV 8.5 fL      Platelets 296 10*3/mm3      Neutrophil % 85.8 %      Lymphocyte % 4.1 %      " "Monocyte % 8.2 %      Eosinophil % 0.4 %      Basophil % 0.5 %      Immature Grans % 1.0 %      Neutrophils, Absolute 16.03 10*3/mm3      Lymphocytes, Absolute 0.76 10*3/mm3      Monocytes, Absolute 1.53 10*3/mm3      Eosinophils, Absolute 0.07 10*3/mm3      Basophils, Absolute 0.09 10*3/mm3      Immature Grans, Absolute 0.19 10*3/mm3      nRBC 0.0 /100 WBC     Lavender Top [[id number]] Collected: 12/26/22 1145    Specimen: Blood Updated: 12/26/22 1222     Extra Tube --    Blood Gas, Arterial - [[id number]]  (Abnormal) Collected: 12/26/22 1130    Specimen: Arterial Blood Updated: 12/26/22 1153     Site Right Radial     Bill's Test Positive     pH, Arterial 7.506 pH units      Comment: 83 Value above reference range        pCO2, Arterial 34.6 mm Hg      Comment: 84 Value below reference range        pO2, Arterial 53.8 mm Hg      Comment: 85 Value below critical limit        HCO3, Arterial 27.4 mmol/L      Comment: 83 Value above reference range        Base Excess, Arterial 4.5 mmol/L      Comment: 83 Value above reference range        O2 Saturation, Arterial 91.8 %      Comment: 84 Value below reference range        Temperature 37.0 C      Barometric Pressure for Blood Gas 754 mmHg      Modality Room Air     FIO2 21 %      Ventilator Mode NA     Notified Who DR SAUCEDA     Notified By [email or web address]     Notified Time 12/26/2022 11:58     [facility]     Comment: Meter: [id number]     :  [facility]        pCO2, Temperature Corrected 34.6 mm Hg      pH, Temp Corrected 7.506 pH Units      pO2, Temperature Corrected 53.8 mm Hg               Objective:     Vitals: /77 (BP Location: Right arm, Patient Position: Lying)   Pulse 82   Temp 97.5 °F (36.4 °C) (Axillary)   Resp 18   Ht 177.8 cm (70\")   Wt (!) 161 kg (356 lb)   SpO2 92%   BMI 51.08 kg/m²      Intake/Output Summary (Last 24 hours) at 12/29/2022 0817  Last data filed at 12/28/2022 1522  Gross per 24 hour   Intake 480 ml   Output --   Net 480 " ml    Temp (24hrs), Av.3 °F (36.8 °C), Min:97.5 °F (36.4 °C), Max:98.9 °F (37.2 °C)      Physical Exam  Constitutional:       Appearance: He is well-developed. He is obese.   HENT:      Head: Normocephalic and atraumatic.   Eyes:      Conjunctiva/sclera: Conjunctivae normal.      Pupils: Pupils are equal, round, and reactive to light.   Cardiovascular:      Rate and Rhythm: Normal rate and regular rhythm.      Heart sounds: Normal heart sounds. No murmur heard.    No friction rub.   Pulmonary:      Effort: Pulmonary effort is normal. No respiratory distress.      Breath sounds: Rhonchi (rll) present. No wheezing or rales.   Abdominal:      General: Bowel sounds are normal. There is no distension.      Palpations: Abdomen is soft.      Tenderness: There is no abdominal tenderness.   Musculoskeletal:         General: Normal range of motion.      Cervical back: Normal range of motion.   Skin:     Capillary Refill: Capillary refill takes less than 2 seconds.   Neurological:      Mental Status: He is alert and oriented to person, place, and time.      Cranial Nerves: No cranial nerve deficit.   Psychiatric:         Behavior: Behavior normal.             Assessment and Plan:     Primary Problem:  Pneumonia due to infectious organism, unspecified laterality, unspecified part of lung    Hospital Problem list:    Pneumonia due to infectious organism, unspecified laterality, unspecified part of lung    Pneumonia of right lung due to infectious organism    Hypertension      PMH:  Past Medical History:   Diagnosis Date   • GERD (gastroesophageal reflux disease)    • Hyperlipidemia    • Hypertension        Treatment Plan:  Community-acquired pneumonia of right lower lobe  Seen on imaging of chest and has elevated white blood cell count.  CTA showing lobar pneumonia.  Continue Rocephin, azithromycin, solu-medrol.  Wean oxygen as able and continue aggressive pulmonary toilet.       Acute respiratory failure with  hypoxia  Patient hypoxic in the 80s requiring oxygen supplementation.  Now on 2 L nasal cannula.  Secondary to pneumonia.  Treat as above.    Hyperglycemia  Patient reports being prediabetic taking metformin at home.  A1c 6.2%. Continue home metformin     Hypertension  Continue home losartan     Hyperlipidemia  Continue home pravastatin    Disposition: Inpatient, anticipate home tomorrow.    Reviewed treatment plans with the patient and/or family.   30 minutes spent in face to face interaction and coordination of care.     Electronically signed by Mick Olmedo MD on 12/29/2022 at 08:17 CST

## 2022-12-29 NOTE — PLAN OF CARE
Goal Outcome Evaluation:  Plan of Care Reviewed With: patient        Progress: improving  Outcome Evaluation: VSS. Pt denies SOA with ambulation. O2 sats stable on 2L/NC. O2 sats stable while asleep with home c-pap, with noted drops to 86% when mask not in proper position. Pt reports occasional dry NP cough. IV abx as ordered. Up ad uday, safety maintained. Wife at bedside.

## 2022-12-29 NOTE — PAYOR COMM NOTE
"REF:  [id number]    [facility]  CARLENE,   [phone]  OR  FAX   [phone]      Conner Jose (42 y.o. Male)     Date of Birth   1980    Social Security Number       Address   [address] [address]    Home Phone   [phone]    MRN   [id number]       Nondenominational   [facility]    Marital Status                               Admission Date   12/26/22    Admission Type   Emergency    Admitting Provider       Attending Provider   Mick Olmedo MD    Department, Room/Bed   [facility] 3C, 376/1       Discharge Date       Discharge Disposition       Discharge Destination                               Attending Provider: Mick Olmedo MD    Allergies: No Known Allergies    Isolation: None   Infection: None   Code Status: CPR    Ht: 177.8 cm (70\")   Wt: 161 kg (356 lb)    Admission Cmt: None   Principal Problem: Pneumonia due to infectious organism, unspecified laterality, unspecified part of lung [J18.9]                 Active Insurance as of 12/26/2022     Primary Coverage     Payor Plan Insurance Group Employer/Plan Group    [employer] [facility] PPO [id number]     Payor Plan Address Payor Plan Phone Number Payor Plan Fax Number Effective Dates    [phone]  8/1/2011 - None Entered    [place] [id number]       Subscriber Name Subscriber Birth Date Member ID       CONNER JOSE 1980 [id number]                 Emergency Contacts      (Rel.) Home Phone Work Phone Mobile Phone    Leigh Jose (Spouse) [phone] -- --      Sandy Salomon LPN   Licensed Nurse  Plan of Care     Signed  Date of Service:  12/29/22 0528  Creation Time:  12/29/22 0528     Signed          Goal Outcome Evaluation:  Plan of Care Reviewed With: patient  Progress: improving  Outcome Evaluation: VSS. Pt denies SOA with ambulation. O2 sats stable on 2L/NC. O2 sats stable while asleep with home c-pap, with noted drops " to 86% when mask not in proper position. Pt reports occasional dry NP cough. IV abx as ordered. Up ad uday, safety maintained. Wife at bedside.              Phyllis Saavedra, RN   Registered Nurse  Nursing  Plan of Care     Signed  Date of Service:  12/28/22 1509  Creation Time:  12/28/22 1509     Signed          Goal Outcome Evaluation:  Plan of Care Reviewed With: patient  Outcome Evaluation: Pt. up in chair most of the day. Steroids given. Accu check TID started today.  Abx given. 2L Nasal cannula; 91-92%. Dry congested cough. Voiding. Up ad uday. Lovenox. No complaints of pain. Spouse at bedside. Safety maintained.                Arabella Wharton RN   Registered Nurse  Plan of Care     Signed  Date of Service:  12/28/22 0642  Creation Time:  12/28/22 0642     Signed          Goal Outcome Evaluation:  Plan of Care Reviewed With: patient  Progress: no change  Outcome Evaluation: Patient denies pain, voiding, up ad uday, 4L O2 @ night, spouse to bring home bi pap, O2 sat low 90's, drops in 80's when sleeping, safety maintained.                  Vital Signs (last day)     Date/Time Temp Temp src Pulse Resp BP Patient Position SpO2    12/29/22 0726 97.5 (36.4) Axillary 82 18 120/77 Lying 92    12/29/22 0439 97.5 (36.4) Axillary 79 18 118/83 Lying 95    12/29/22 0010 98.6 (37) Axillary 77 18 123/61 Lying 93    12/28/22 2105 -- -- 91 16 -- -- 99    12/28/22 2057 -- -- 93 18 -- -- 91    12/28/22 2014 98.9 (37.2) Oral 98 20 130/69 Sitting 93    12/28/22 1522 98.2 (36.8) Oral 89 20 148/86 Sitting 97    12/28/22 1438 -- -- 79 20 -- Sitting 99    12/28/22 1433 -- -- 88 18 -- Sitting 93    12/28/22 1122 98.9 (37.2) Oral 92 16 115/76 Sitting 88    12/28/22 0742 98.1 (36.7) Oral 102 18 130/76 Lying 91    12/28/22 0640 -- -- 98 16 -- Lying 99    12/28/22 0633 -- -- 98 16 -- Lying 93    12/28/22 0332 -- -- 104 18 -- -- 90    12/28/22 0316 99 (37.2) Oral 102 16 131/61 Lying 93          Oxygen Therapy (last day)     Date/Time SpO2  Device (Oxygen Therapy) Flow (L/min) Oxygen Concentration (%) ETCO2 (mmHg)    12/29/22 0726 92 CPAP 2 -- --    12/29/22 0439 95 CPAP -- -- --    12/29/22 0010 93 CPAP -- -- --    12/28/22 2105 99 humidified;nasal cannula 2 -- --    12/28/22 2057 91 humidified;nasal cannula 2 -- --    12/28/22 2039 -- humidified;nasal cannula 2 -- --    12/28/22 2014 93 nasal cannula -- -- --    12/28/22 1522 97 nasal cannula 3 -- --    12/28/22 1438 99 nasal cannula 3 -- --    12/28/22 1433 93 nasal cannula 3 -- --    12/28/22 1122 88 nasal cannula 4 -- --    12/28/22 0805 -- room air -- -- --    12/28/22 0742 91 room air -- -- --    12/28/22 0640 99 humidified;nasal cannula 4 -- --    12/28/22 0633 93 humidified;nasal cannula 4 -- --    12/28/22 0332 90 nasal cannula;high-flow nasal cannula 4 -- --    12/28/22 0316 93 nasal cannula 4 -- --          Intake & Output (last day)       12/28 0701  12/29 0700 12/29 0701  12/30 0700    P.O. 480     Total Intake(mL/kg) 480 (3)     Net +480           Urine Unmeasured Occurrence 3 x         Current Facility-Administered Medications   Medication Dose Route Frequency Provider Last Rate Last Admin   • acetaminophen (TYLENOL) tablet 650 mg  650 mg Oral Q4H PRN Jered Rodriguez MD   650 mg at 12/28/22 0704   • azithromycin (ZITHROMAX) 500 mg in sodium chloride 0.9 % 250 mL IVPB-VTB  500 mg Intravenous Q24H Jered Rodriguez MD   500 mg at 12/28/22 1647   • cefTRIAXone (ROCEPHIN) 1 g in sodium chloride 0.9 % 100 mL IVPB-VTB  1 g Intravenous Q24H Jered Rodriguez  mL/hr at 12/28/22 1539 1 g at 12/28/22 1539   • dextrose (D50W) (25 g/50 mL) IV injection 25 g  25 g Intravenous Q15 Min PRN Elvin Scott MD       • dextrose (GLUTOSE) oral gel 15 g  15 g Oral Q15 Min PRN Elvin Scott MD       • Enoxaparin Sodium (LOVENOX) syringe 40 mg  40 mg Subcutaneous Q12H Elvin Scott MD   40 mg at 12/29/22 0613   • glucagon (human recombinant) (GLUCAGEN DIAGNOSTIC)  injection 1 mg  1 mg Intramuscular Q15 Min PRN Elvin Scott MD       • HYDROcodone-acetaminophen (NORCO) 5-325 MG per tablet 1 tablet  1 tablet Oral Q4H PRN Jered Rodriguez MD       • Insulin Lispro (humaLOG) injection 0-9 Units  0-9 Units Subcutaneous TID AC Elvin Scott MD   2 Units at 12/29/22 0831   • ipratropium-albuterol (DUO-NEB) nebulizer solution 3 mL  3 mL Nebulization TID - RT Jered Rodriguez MD   3 mL at 12/28/22 2057   • LORazepam (ATIVAN) tablet 0.5 mg  0.5 mg Oral Q8H PRN Jered Rodriguez MD       • losartan (COZAAR) tablet 100 mg  100 mg Oral Nightly Elvin Scott MD   100 mg at 12/28/22 2039   • melatonin tablet 6 mg  6 mg Oral Nightly PRN Jered Rodriguez MD       • metFORMIN (GLUCOPHAGE) tablet 500 mg  500 mg Oral BID With Meals Elvin Scott MD   500 mg at 12/29/22 0932   • methylPREDNISolone sodium succinate (SOLU-Medrol) injection 40 mg  40 mg Intravenous Q12H Elvin Scott MD   40 mg at 12/29/22 0816   • ondansetron (ZOFRAN) tablet 4 mg  4 mg Oral Q6H PRN Jered Rodriguez MD       • pantoprazole (PROTONIX) EC tablet 40 mg  40 mg Oral QAM Jered Rodriguez MD   40 mg at 12/29/22 0613   • pravastatin (PRAVACHOL) tablet 40 mg  40 mg Oral Nightly Elvin Scott MD   40 mg at 12/28/22 2039   • sodium chloride 0.9 % flush 10 mL  10 mL Intravenous Q12H Jered Rodriguez MD   10 mL at 12/29/22 0815   • sodium chloride 0.9 % flush 10 mL  10 mL Intravenous PRN Jered Rodriguez MD       • sodium chloride 0.9 % infusion 40 mL  40 mL Intravenous PRJered Marquez MD         Orders (last 24 hrs)      Start     Ordered    12/30/22 0600  Comprehensive Metabolic Panel  Daily,   Status:  Canceled       12/29/22 0719    12/29/22 0831  POC Glucose Once  PROCEDURE ONCE         12/29/22 0819    12/29/22 0720  CBC (No Diff)  Daily       12/29/22 0719    12/29/22 0720  Comprehensive Metabolic Panel  Daily       12/29/22 0719     12/28/22 2100  losartan (COZAAR) tablet 100 mg  Nightly         12/28/22 0732    12/28/22 1703  POC Glucose Once  PROCEDURE ONCE         12/28/22 1651    12/28/22 1131  POC Glucose Once  PROCEDURE ONCE         12/28/22 1118    12/28/22 1100  POC Glucose TID AC  3 Times Daily Before Meals       12/28/22 0729    12/28/22 0815  Insulin Lispro (humaLOG) injection 0-9 Units  3 Times Daily Before Meals         12/28/22 0729    12/28/22 0815  metFORMIN (GLUCOPHAGE) tablet 500 mg  2 Times Daily With Meals         12/28/22 0729    12/28/22 0800  methylPREDNISolone sodium succinate (SOLU-Medrol) injection 40 mg  Every 12 Hours         12/28/22 0729    12/28/22 0727  dextrose (GLUTOSE) oral gel 15 g  Every 15 Minutes PRN         12/28/22 0729    12/28/22 0727  dextrose (D50W) (25 g/50 mL) IV injection 25 g  Every 15 Minutes PRN         12/28/22 0729    12/28/22 0727  glucagon (human recombinant) (GLUCAGEN DIAGNOSTIC) injection 1 mg  Every 15 Minutes PRN         12/28/22 0729    12/27/22 2100  pravastatin (PRAVACHOL) tablet 40 mg  Nightly         12/27/22 1057    12/27/22 1830  Enoxaparin Sodium (LOVENOX) syringe 40 mg  Every 12 Hours Scheduled         12/27/22 1730    12/27/22 1630  azithromycin (ZITHROMAX) 500 mg in sodium chloride 0.9 % 250 mL IVPB-VTB  Every 24 Hours         12/26/22 1754    12/27/22 1600  cefTRIAXone (ROCEPHIN) 1 g in sodium chloride 0.9 % 100 mL IVPB-VTB  Every 24 Hours         12/26/22 1754    12/27/22 1000  Incentive Spirometry  Every 4 Hours While Awake       12/27/22 0745    12/27/22 0700  pantoprazole (PROTONIX) EC tablet 40 mg  Every Morning         12/26/22 1754    12/26/22 2100  sodium chloride 0.9 % flush 10 mL  Every 12 Hours Scheduled         12/26/22 1754    12/26/22 2030  ipratropium-albuterol (DUO-NEB) nebulizer solution 3 mL  3 Times Daily - RT         12/26/22 1754 12/26/22 2000  Vital Signs  Every 4 Hours       12/26/22 1754 12/26/22 1800  Oral Care  2 Times Daily       12/26/22  1754 12/26/22 1755  Intake & Output  Every Shift       12/26/22 1754 12/26/22 1754  acetaminophen (TYLENOL) tablet 650 mg  Every 4 Hours PRN         12/26/22 1754    12/26/22 1754  HYDROcodone-acetaminophen (NORCO) 5-325 MG per tablet 1 tablet  Every 4 Hours PRN         12/26/22 1754    12/26/22 1754  LORazepam (ATIVAN) tablet 0.5 mg  Every 8 Hours PRN         12/26/22 1754    12/26/22 1754  melatonin tablet 6 mg  Nightly PRN         12/26/22 1754 12/26/22 1754  ondansetron (ZOFRAN) tablet 4 mg  Every 6 Hours PRN         12/26/22 1754 12/26/22 1754  sodium chloride 0.9 % flush 10 mL  As Needed         12/26/22 1754 12/26/22 1754  sodium chloride 0.9 % infusion 40 mL  As Needed         12/26/22 1754    Unscheduled  Follow Hypoglycemia Standing Orders For Blood Glucose <70 & Notify Provider of Treatment  As Needed      Comments: Follow Hypoglycemia Orders As Outlined in Process Instructions (Open Order Report to View Full Instructions)  Notify Provider Any Time Hypoglycemia Treatment is Administered    12/28/22 0729    --  SCANNED EKG         12/26/22 0000                   Physician Progress Notes (last 24 hours)      Mick Olmedo MD at 12/29/22 0817            Daily Progress Note  Conner Almonte  MRN: [id number] LOS: 3    Admit Date: 12/26/2022 12/29/2022 08:17 CST    Subjective:          Chief Complaint:  Chief Complaint   Patient presents with   • Shortness of Breath       Interval History:    Reviewed overnight events and nursing notes.   Patient had multiple desats overnight, improved somewhat with home bipap. Still requiring oxygen. Anticipate home tomorrow.    Review of Systems   Constitutional: Negative for chills and fever.   HENT: Positive for congestion.    Respiratory: Positive for cough.        DIET:  Diet: Regular/House Diet; Texture: Regular Texture (IDDSI 7); Fluid Consistency: Thin (IDDSI 0)    Medications:      azithromycin, 500 mg, Intravenous, Q24H  cefTRIAXone, 1 g,  Intravenous, Q24H  enoxaparin, 40 mg, Subcutaneous, Q12H  insulin lispro, 0-9 Units, Subcutaneous, TID AC  ipratropium-albuterol, 3 mL, Nebulization, TID - RT  losartan, 100 mg, Oral, Nightly  metFORMIN, 500 mg, Oral, BID With Meals  methylPREDNISolone sodium succinate, 40 mg, Intravenous, Q12H  pantoprazole, 40 mg, Oral, QAM  pravastatin, 40 mg, Oral, Nightly  sodium chloride, 10 mL, Intravenous, Q12H        Data:     Code Status:   Code Status and Medical Interventions:   Ordered at: 12/26/22 1717     Code Status (Patient has no pulse and is not breathing):    CPR (Attempt to Resuscitate)     Medical Interventions (Patient has pulse or is breathing):    Full Support       Family History   Problem Relation Age of Onset   • Colon cancer Father    • Colon polyps Neg Hx      Social History     Socioeconomic History   • Marital status:    Tobacco Use   • Smoking status: Former     Types: Cigarettes   • Smokeless tobacco: Never   Vaping Use   • Vaping Use: Never used   Substance and Sexual Activity   • Alcohol use: Yes     Comment: beer every weekend   • Drug use: No   • Sexual activity: Defer       Labs:      Lab Results (last 72 hours)     Procedure Component Value Units Date/Time    POC Glucose Once [[id number]]  (Abnormal) Collected: 12/28/22 1651    Specimen: Blood Updated: 12/28/22 1702     Glucose 264 mg/dL      Comment: : [id number] Everardo Holguin ID: [id number]       Blood Culture - Blood, Arm, Right [[id number]]  (Normal) Collected: 12/26/22 1150    Specimen: Blood from Arm, Right Updated: 12/28/22 1216     Blood Culture No growth at 2 days    Blood Culture - Blood, Arm, Left [[id number]]  (Normal) Collected: 12/26/22 1145    Specimen: Blood from Arm, Left Updated: 12/28/22 1216     Blood Culture No growth at 2 days    POC Glucose Once [[id number]]  (Abnormal) Collected: 12/28/22 1118    Specimen: Blood Updated: 12/28/22 1130     Glucose 213 mg/dL      Comment: : [id number] Everardo Holguin ID:  [id number]       POC Glucose Once [[id number]]  (Abnormal) Collected: 12/28/22 0805    Specimen: Blood Updated: 12/28/22 0816     Glucose 141 mg/dL      Comment: : [id number] Everardo Holguin ID: [id number]       Hemoglobin A1c [[id number]]  (Abnormal) Collected: 12/28/22 0637    Specimen: Blood Updated: 12/28/22 0750     Hemoglobin A1C 6.20 %     Narrative:      Hemoglobin A1C Ranges:    Increased Risk for Diabetes  5.7% to 6.4%  Diabetes                     >= 6.5%  Diabetic Goal                < 7.0%    Comprehensive Metabolic Panel [[id number]]  (Abnormal) Collected: 12/28/22 0637    Specimen: Blood Updated: 12/28/22 0714     Glucose 155 mg/dL      BUN 17 mg/dL      Creatinine 0.79 mg/dL      Sodium 141 mmol/L      Potassium 4.1 mmol/L      Chloride 103 mmol/L      CO2 26.0 mmol/L      Calcium 8.2 mg/dL      Total Protein 6.1 g/dL      Albumin 3.6 g/dL      ALT (SGPT) 23 U/L      AST (SGOT) 13 U/L      Alkaline Phosphatase 66 U/L      Total Bilirubin 0.4 mg/dL      Globulin 2.5 gm/dL      A/G Ratio 1.4 g/dL      BUN/Creatinine Ratio 21.5     Anion Gap 12.0 mmol/L      eGFR 112.3 mL/min/1.73      Comment: National Kidney Foundation and American Society of Nephrology (ASN) Task Force recommended calculation based on the Chronic Kidney Disease Epidemiology Collaboration (CKD-EPI) equation refit without adjustment for race.       Narrative:      GFR Normal >60  Chronic Kidney Disease <60  Kidney Failure <15      CBC & Differential [[id number]]  (Abnormal) Collected: 12/28/22 0637    Specimen: Blood Updated: 12/28/22 0652    Narrative:      The following orders were created for panel order CBC & Differential.  Procedure                               Abnormality         Status                     ---------                               -----------         ------                     CBC Auto Differential[[id number]]        Abnormal            Final result                 Please view results for these tests on the  individual orders.    CBC Auto Differential [[id number]]  (Abnormal) Collected: 12/28/22 0637    Specimen: Blood Updated: 12/28/22 0652     WBC 14.41 10*3/mm3      RBC 4.73 10*6/mm3      Hemoglobin 13.5 g/dL      Hematocrit 41.4 %      MCV 87.5 fL      MCH 28.5 pg      MCHC 32.6 g/dL      RDW 14.8 %      RDW-SD 47.1 fl      MPV 8.8 fL      Platelets 266 10*3/mm3      Neutrophil % 81.3 %      Lymphocyte % 9.4 %      Monocyte % 7.5 %      Eosinophil % 0.7 %      Basophil % 0.3 %      Immature Grans % 0.8 %      Neutrophils, Absolute 11.70 10*3/mm3      Lymphocytes, Absolute 1.36 10*3/mm3      Monocytes, Absolute 1.08 10*3/mm3      Eosinophils, Absolute 0.10 10*3/mm3      Basophils, Absolute 0.05 10*3/mm3      Immature Grans, Absolute 0.12 10*3/mm3      nRBC 0.0 /100 WBC     Comprehensive Metabolic Panel [[id number]]  (Abnormal) Collected: 12/27/22 0433    Specimen: Blood Updated: 12/27/22 0540     Glucose 226 mg/dL      BUN 14 mg/dL      Creatinine 0.70 mg/dL      Sodium 137 mmol/L      Potassium 3.8 mmol/L      Chloride 99 mmol/L      CO2 30.0 mmol/L      Calcium 9.2 mg/dL      Total Protein 7.5 g/dL      Albumin 4.3 g/dL      ALT (SGPT) 29 U/L      AST (SGOT) 17 U/L      Alkaline Phosphatase 74 U/L      Total Bilirubin 0.5 mg/dL      Globulin 3.2 gm/dL      A/G Ratio 1.3 g/dL      BUN/Creatinine Ratio 20.0     Anion Gap 8.0 mmol/L      eGFR 116.5 mL/min/1.73      Comment: National Kidney Foundation and American Society of Nephrology (ASN) Task Force recommended calculation based on the Chronic Kidney Disease Epidemiology Collaboration (CKD-EPI) equation refit without adjustment for race.       Narrative:      GFR Normal >60  Chronic Kidney Disease <60  Kidney Failure <15      CBC Auto Differential [[id number]]  (Abnormal) Collected: 12/27/22 0433    Specimen: Blood Updated: 12/27/22 0519     WBC 19.86 10*3/mm3      RBC 5.21 10*6/mm3      Hemoglobin 14.7 g/dL      Hematocrit 45.7 %      MCV 87.7 fL      MCH 28.2 pg       MCHC 32.2 g/dL      RDW 14.7 %      RDW-SD 47.6 fl      MPV 8.7 fL      Platelets 309 10*3/mm3      Neutrophil % 89.5 %      Lymphocyte % 4.1 %      Monocyte % 5.1 %      Eosinophil % 0.2 %      Basophil % 0.2 %      Immature Grans % 0.9 %      Neutrophils, Absolute 17.77 10*3/mm3      Lymphocytes, Absolute 0.82 10*3/mm3      Monocytes, Absolute 1.02 10*3/mm3      Eosinophils, Absolute 0.03 10*3/mm3      Basophils, Absolute 0.04 10*3/mm3      Immature Grans, Absolute 0.18 10*3/mm3      nRBC 0.0 /100 WBC     STAT Lactic Acid, Reflex [[id number]]  (Normal) Collected: 12/26/22 1514    Specimen: Blood Updated: 12/26/22 1554     Lactate 1.8 mmol/L     [place] Draw [[id number]] Collected: 12/26/22 1145    Specimen: Blood Updated: 12/26/22 1545    Narrative:      The following orders were created for panel order [place] Draw.  Procedure                               Abnormality         Status                     ---------                               -----------         ------                     Green Top (Gel)[[id number]]                                  Final result               Lavender Top[[id number]]                                     Final result               Red Top[[id number]]                                          Final result               [place] Blood Culture Haresh...[[id number]]                      Final result               Singh Top[[id number]]                                         Final result               Light Blue Top[[id number]]                                   Final result                 Please view results for these tests on the individual orders.    Singh Top [[id number]] Collected: 12/26/22 1145    Specimen: Blood Updated: 12/26/22 1545     Extra Tube Hold for add-ons.     Comment: Auto resulted.       COVID PRE-OP / PRE-PROCEDURE SCREENING ORDER (NO ISOLATION) - Swab, Nasopharynx [[id number]]  (Normal) Collected: 12/26/22 1209    Specimen: Swab from Nasopharynx Updated: 12/26/22 4023    Narrative:       The following orders were created for panel order COVID PRE-OP / PRE-PROCEDURE SCREENING ORDER (NO ISOLATION) - Swab, Nasopharynx.  Procedure                               Abnormality         Status                     ---------                               -----------         ------                     COVID-19, FLU A/B, RSV P...[[id number]]  Normal              Final result                 Please view results for these tests on the individual orders.    COVID-19, FLU A/B, RSV PCR - Swab, Nasopharynx [[id number]]  (Normal) Collected: 12/26/22 1209    Specimen: Swab from Nasopharynx Updated: 12/26/22 1333     COVID19 Not Detected     Influenza A PCR Not Detected     Influenza B PCR Not Detected     RSV, PCR Not Detected    Narrative:      Fact sheet for providers: https://www.fda.gov/media/817942/download    Fact sheet for patients: https://www.fda.gov/media/639517/download    Test performed by PCR.    Lactic Acid, Plasma [[id number]]  (Abnormal) Collected: 12/26/22 1145    Specimen: Blood Updated: 12/26/22 1319     Lactate 2.4 mmol/L     Troponin [[id number]]  (Normal) Collected: 12/26/22 1145    Specimen: Blood Updated: 12/26/22 1303     Troponin T <0.010 ng/mL     Narrative:      Troponin T Reference Range:  <= 0.03 ng/mL-   Negative for AMI  >0.03 ng/mL-     Abnormal for myocardial necrosis.  Clinicians would have to utilize clinical acumen, EKG, Troponin and serial changes to determine if it is an Acute Myocardial Infarction or myocardial injury due to an underlying chronic condition.       Results may be falsely decreased if patient taking Biotin.      [employer] Blood Culture Bottle Set [[id number]] Collected: 12/26/22 1150    Specimen: Blood from Arm, Right Updated: 12/26/22 1300     Extra Tube Hold for add-ons.     Comment: Auto resulted.       Procalcitonin [[id number]]  (Normal) Collected: 12/26/22 1145    Specimen: Blood Updated: 12/26/22 1249     Procalcitonin 0.18 ng/mL     Narrative:      As a Marker for  "Sepsis (Non-Neonates):    1. <0.5 ng/mL represents a low risk of severe sepsis and/or septic shock.  2. >2 ng/mL represents a high risk of severe sepsis and/or septic shock.    As a Marker for Lower Respiratory Tract Infections that require antibiotic therapy:    PCT on Admission    Antibiotic Therapy       6-12 Hrs later    >0.5                Strongly Recommended  >0.25 - <0.5        Recommended   0.1 - 0.25          Discouraged              Remeasure/reassess PCT  <0.1                Strongly Discouraged     Remeasure/reassess PCT    As 28 day mortality risk marker: \"Change in Procalcitonin Result\" (>80% or <=80%) if Day 0 (or Day 1) and Day 4 values are available. Refer to http://www.[facility] – TulsaVisualXcriptpct-calculator.com    Change in PCT <=80%  A decrease of PCT levels below or equal to 80% defines a positive change in PCT test result representing a higher risk for 28-day all-cause mortality of patients diagnosed with severe sepsis for septic shock.    Change in PCT >80%  A decrease of PCT levels of more than 80% defines a negative change in PCT result representing a lower risk for 28-day all-cause mortality of patients diagnosed with severe sepsis or septic shock.       Green Top (Gel) [[id number]] Collected: 12/26/22 1145    Specimen: Blood Updated: 12/26/22 1245     Extra Tube Hold for add-ons.     Comment: Auto resulted.       Light Blue Top [[id number]] Collected: 12/26/22 1145    Specimen: Blood Updated: 12/26/22 1245     Extra Tube Hold for add-ons.     Comment: Auto resulted       Red Top [[id number]] Collected: 12/26/22 1145    Specimen: Blood Updated: 12/26/22 1245     Extra Tube Hold for add-ons.     Comment: Auto resulted.       BNP [[id number]]  (Normal) Collected: 12/26/22 1145    Specimen: Blood Updated: 12/26/22 1240     proBNP 39.1 pg/mL     Narrative:      Among patients with dyspnea, NT-proBNP is highly sensitive for the detection of acute congestive heart failure. In addition NT-proBNP of <300 pg/ml " effectively rules out acute congestive heart failure with 99% negative predictive value.      Comprehensive Metabolic Panel [[id number]]  (Abnormal) Collected: 12/26/22 1145    Specimen: Blood Updated: 12/26/22 1239     Glucose 147 mg/dL      BUN 11 mg/dL      Creatinine 0.73 mg/dL      Sodium 135 mmol/L      Potassium 4.1 mmol/L      Chloride 97 mmol/L      CO2 27.0 mmol/L      Calcium 9.1 mg/dL      Total Protein 7.9 g/dL      Albumin 4.40 g/dL      ALT (SGPT) 32 U/L      AST (SGOT) 18 U/L      Alkaline Phosphatase 73 U/L      Total Bilirubin 1.3 mg/dL      Globulin 3.5 gm/dL      A/G Ratio 1.3 g/dL      BUN/Creatinine Ratio 15.1     Anion Gap 11.0 mmol/L      eGFR 115.1 mL/min/1.73      Comment: National Kidney Foundation and American Society of Nephrology (ASN) Task Force recommended calculation based on the Chronic Kidney Disease Epidemiology Collaboration (CKD-EPI) equation refit without adjustment for race.       Narrative:      GFR Normal >60  Chronic Kidney Disease <60  Kidney Failure <15      Protime-INR [[id number]]  (Normal) Collected: 12/26/22 1145    Specimen: Blood Updated: 12/26/22 1230     Protime 13.5 Seconds      INR 1.02    D-dimer, Quantitative [[id number]]  (Normal) Collected: 12/26/22 1145    Specimen: Blood Updated: 12/26/22 1230     D-Dimer, Quantitative 0.41 MCGFEU/mL     Narrative:      According to the assay 's published package insert, a normal (<0.50 MCGFEU/mL) D-dimer result in conjunction with a non-high clinical probability assessment, excludes deep vein thrombosis (DVT) and pulmonary embolism (PE) with high sensitivity.    D-dimer values increase with age and this can make VTE exclusion of an older population difficult. To address this, the American College of Physicians, based on best available evidence and recent guidelines, recommends that clinicians use age-adjusted D-dimer thresholds in patients greater than 50 years of age with: a) a low probability of PE who do not  "meet all Pulmonary Embolism Rule Out Criteria, or b) in those with intermediate probability of PE.   The formula for an age-adjusted D-dimer cut-off is \"age/100\".  For example, a 60 year old patient would have an age-adjusted cut-off of 0.60 MCGFEU/mL and an 80 year old 0.80 MCGFEU/mL.    CBC & Differential [[id number]]  (Abnormal) Collected: 12/26/22 1145    Specimen: Blood Updated: 12/26/22 1223    Narrative:      The following orders were created for panel order CBC & Differential.  Procedure                               Abnormality         Status                     ---------                               -----------         ------                     CBC Auto Differential[[id number]]        Abnormal            Final result                 Please view results for these tests on the individual orders.    CBC Auto Differential [[id number]]  (Abnormal) Collected: 12/26/22 1145    Specimen: Blood Updated: 12/26/22 1223     WBC 18.67 10*3/mm3      RBC 5.38 10*6/mm3      Hemoglobin 15.0 g/dL      Hematocrit 46.7 %      MCV 86.8 fL      MCH 27.9 pg      MCHC 32.1 g/dL      RDW 14.8 %      RDW-SD 46.9 fl      MPV 8.5 fL      Platelets 296 10*3/mm3      Neutrophil % 85.8 %      Lymphocyte % 4.1 %      Monocyte % 8.2 %      Eosinophil % 0.4 %      Basophil % 0.5 %      Immature Grans % 1.0 %      Neutrophils, Absolute 16.03 10*3/mm3      Lymphocytes, Absolute 0.76 10*3/mm3      Monocytes, Absolute 1.53 10*3/mm3      Eosinophils, Absolute 0.07 10*3/mm3      Basophils, Absolute 0.09 10*3/mm3      Immature Grans, Absolute 0.19 10*3/mm3      nRBC 0.0 /100 WBC     Lavender Top [[id number]] Collected: 12/26/22 1145    Specimen: Blood Updated: 12/26/22 1222     Extra Tube --    Blood Gas, Arterial - [[id number]]  (Abnormal) Collected: 12/26/22 1130    Specimen: Arterial Blood Updated: 12/26/22 1153     Site Right Radial     Bill's Test Positive     pH, Arterial 7.506 pH units      Comment: 83 Value above reference range        " "pCO2, Arterial 34.6 mm Hg      Comment: 84 Value below reference range        pO2, Arterial 53.8 mm Hg      Comment: 85 Value below critical limit        HCO3, Arterial 27.4 mmol/L      Comment: 83 Value above reference range        Base Excess, Arterial 4.5 mmol/L      Comment: 83 Value above reference range        O2 Saturation, Arterial 91.8 %      Comment: 84 Value below reference range        Temperature 37.0 C      Barometric Pressure for Blood Gas 754 mmHg      Modality Room Air     FIO2 21 %      Ventilator Mode NA     Notified Who DR SAUCEDA     Notified By [email or web address]     Notified Time 2022 11:58     [facility]     Comment: Meter: [id number]     :  HCASE1        pCO2, Temperature Corrected 34.6 mm Hg      pH, Temp Corrected 7.506 pH Units      pO2, Temperature Corrected 53.8 mm Hg               Objective:     Vitals: /77 (BP Location: Right arm, Patient Position: Lying)   Pulse 82   Temp 97.5 °F (36.4 °C) (Axillary)   Resp 18   Ht 177.8 cm (70\")   Wt (!) 161 kg (356 lb)   SpO2 92%   BMI 51.08 kg/m²      Intake/Output Summary (Last 24 hours) at 2022 0817  Last data filed at 2022 1522  Gross per 24 hour   Intake 480 ml   Output --   Net 480 ml    Temp (24hrs), Av.3 °F (36.8 °C), Min:97.5 °F (36.4 °C), Max:98.9 °F (37.2 °C)      Physical Exam  Constitutional:       Appearance: He is well-developed. He is obese.   HENT:      Head: Normocephalic and atraumatic.   Eyes:      Conjunctiva/sclera: Conjunctivae normal.      Pupils: Pupils are equal, round, and reactive to light.   Cardiovascular:      Rate and Rhythm: Normal rate and regular rhythm.      Heart sounds: Normal heart sounds. No murmur heard.    No friction rub.   Pulmonary:      Effort: Pulmonary effort is normal. No respiratory distress.      Breath sounds: Rhonchi (rll) present. No wheezing or rales.   Abdominal:      General: Bowel sounds are normal. There is no distension.      Palpations: " Abdomen is soft.      Tenderness: There is no abdominal tenderness.   Musculoskeletal:         General: Normal range of motion.      Cervical back: Normal range of motion.   Skin:     Capillary Refill: Capillary refill takes less than 2 seconds.   Neurological:      Mental Status: He is alert and oriented to person, place, and time.      Cranial Nerves: No cranial nerve deficit.   Psychiatric:         Behavior: Behavior normal.             Assessment and Plan:     Primary Problem:  Pneumonia due to infectious organism, unspecified laterality, unspecified part of lung    Hospital Problem list:    Pneumonia due to infectious organism, unspecified laterality, unspecified part of lung    Pneumonia of right lung due to infectious organism    Hypertension      PMH:  Past Medical History:   Diagnosis Date   • GERD (gastroesophageal reflux disease)    • Hyperlipidemia    • Hypertension        Treatment Plan:  Community-acquired pneumonia of right lower lobe  Seen on imaging of chest and has elevated white blood cell count.  CTA showing lobar pneumonia.  Continue Rocephin, azithromycin, solu-medrol.  Wean oxygen as able and continue aggressive pulmonary toilet.       Acute respiratory failure with hypoxia  Patient hypoxic in the 80s requiring oxygen supplementation.  Now on 2 L nasal cannula.  Secondary to pneumonia.  Treat as above.    Hyperglycemia  Patient reports being prediabetic taking metformin at home.  A1c 6.2%. Continue home metformin     Hypertension  Continue home losartan     Hyperlipidemia  Continue home pravastatin    Disposition: Inpatient, anticipate home tomorrow.    Reviewed treatment plans with the patient and/or family.   30 minutes spent in face to face interaction and coordination of care.     Electronically signed by Mick Olmedo MD on 12/29/2022 at 08:17 CST    Electronically signed by Mick Olmedo MD at 12/29/22 0820

## 2022-12-30 VITALS
RESPIRATION RATE: 18 BRPM | BODY MASS INDEX: 45.1 KG/M2 | HEART RATE: 82 BPM | HEIGHT: 70 IN | WEIGHT: 315 LBS | OXYGEN SATURATION: 94 % | TEMPERATURE: 98.6 F | DIASTOLIC BLOOD PRESSURE: 88 MMHG | SYSTOLIC BLOOD PRESSURE: 148 MMHG

## 2022-12-30 LAB
ALBUMIN SERPL-MCNC: 3.6 G/DL (ref 3.5–5.2)
ALBUMIN/GLOB SERPL: 1.2 G/DL
ALP SERPL-CCNC: 67 U/L (ref 39–117)
ALT SERPL W P-5'-P-CCNC: 32 U/L (ref 1–41)
ANION GAP SERPL CALCULATED.3IONS-SCNC: 9 MMOL/L (ref 5–15)
AST SERPL-CCNC: 17 U/L (ref 1–40)
BILIRUB SERPL-MCNC: 0.2 MG/DL (ref 0–1.2)
BUN SERPL-MCNC: 15 MG/DL (ref 6–20)
BUN/CREAT SERPL: 22.1 (ref 7–25)
CALCIUM SPEC-SCNC: 8.2 MG/DL (ref 8.6–10.5)
CHLORIDE SERPL-SCNC: 102 MMOL/L (ref 98–107)
CO2 SERPL-SCNC: 28 MMOL/L (ref 22–29)
CREAT SERPL-MCNC: 0.68 MG/DL (ref 0.76–1.27)
DEPRECATED RDW RBC AUTO: 47.9 FL (ref 37–54)
EGFRCR SERPLBLD CKD-EPI 2021: 117.5 ML/MIN/1.73
ERYTHROCYTE [DISTWIDTH] IN BLOOD BY AUTOMATED COUNT: 14.6 % (ref 12.3–15.4)
GLOBULIN UR ELPH-MCNC: 2.9 GM/DL
GLUCOSE BLDC GLUCOMTR-MCNC: 177 MG/DL (ref 70–130)
GLUCOSE SERPL-MCNC: 231 MG/DL (ref 65–99)
HCT VFR BLD AUTO: 45.2 % (ref 37.5–51)
HGB BLD-MCNC: 14.1 G/DL (ref 13–17.7)
MCH RBC QN AUTO: 28.2 PG (ref 26.6–33)
MCHC RBC AUTO-ENTMCNC: 31.2 G/DL (ref 31.5–35.7)
MCV RBC AUTO: 90.4 FL (ref 79–97)
PLATELET # BLD AUTO: 324 10*3/MM3 (ref 140–450)
PMV BLD AUTO: 8.8 FL (ref 6–12)
POTASSIUM SERPL-SCNC: 4.7 MMOL/L (ref 3.5–5.2)
PROT SERPL-MCNC: 6.5 G/DL (ref 6–8.5)
RBC # BLD AUTO: 5 10*6/MM3 (ref 4.14–5.8)
SODIUM SERPL-SCNC: 139 MMOL/L (ref 136–145)
WBC NRBC COR # BLD: 13.93 10*3/MM3 (ref 3.4–10.8)

## 2022-12-30 PROCEDURE — 85027 COMPLETE CBC AUTOMATED: CPT | Performed by: FAMILY MEDICINE

## 2022-12-30 PROCEDURE — 25010000002 METHYLPREDNISOLONE PER 40 MG: Performed by: FAMILY MEDICINE

## 2022-12-30 PROCEDURE — 82962 GLUCOSE BLOOD TEST: CPT

## 2022-12-30 PROCEDURE — 63710000001 INSULIN LISPRO (HUMAN) PER 5 UNITS: Performed by: FAMILY MEDICINE

## 2022-12-30 PROCEDURE — 25010000002 ENOXAPARIN PER 10 MG: Performed by: FAMILY MEDICINE

## 2022-12-30 PROCEDURE — 80053 COMPREHEN METABOLIC PANEL: CPT | Performed by: FAMILY MEDICINE

## 2022-12-30 RX ADMIN — METFORMIN HYDROCHLORIDE 500 MG: 500 TABLET ORAL at 08:16

## 2022-12-30 RX ADMIN — METHYLPREDNISOLONE SODIUM SUCCINATE 40 MG: 40 INJECTION, POWDER, FOR SOLUTION INTRAMUSCULAR; INTRAVENOUS at 08:16

## 2022-12-30 RX ADMIN — ENOXAPARIN SODIUM 40 MG: 100 INJECTION SUBCUTANEOUS at 06:02

## 2022-12-30 RX ADMIN — PANTOPRAZOLE SODIUM 40 MG: 40 TABLET, DELAYED RELEASE ORAL at 08:17

## 2022-12-30 RX ADMIN — Medication 10 ML: at 08:16

## 2022-12-30 RX ADMIN — INSULIN LISPRO 2 UNITS: 100 INJECTION, SOLUTION INTRAVENOUS; SUBCUTANEOUS at 08:22

## 2022-12-30 NOTE — PLAN OF CARE
Goal Outcome Evaluation:  Plan of Care Reviewed With: patient, spouse        Progress: improving  Outcome Evaluation: Denies SOA. Cpap on during sleep. IID. Up ad uday. Patient hopes to go home today.

## 2022-12-30 NOTE — DISCHARGE SUMMARY
Hospital Discharge Summary    Conner Almonte  :  1980  MRN:  [id number]    Admit date:  2022  Discharge date: 2022    Admitting Physician:    No admitting provider for patient encounter.    Discharge Diagnoses:      Pneumonia due to infectious organism, unspecified laterality, unspecified part of lung    Pneumonia of right lung due to infectious organism    Hypertension      Hospital Course:   The patient is a 42 y.o. male who presents with worsening upper respiratory symptoms.  The patient states that he developed mild cold-like symptoms over a week ago with sinus drainage and cough.  He reports that his cough gradually worsened and then he became short of breath and developed fever over the last couple of days.  Presented to the ER for worsening condition over the weekend and was found to have right lower lobe pneumonia.  Admitted for IV antibiotics and further treatment.  Improved with this treatment.  Finished course of Rocephin and azithromycin.  Discharged home with new prescription of metformin twice daily, close follow-up in the office.    The patient was admitted for the above noted medical/surgical issues. Please see daily progress note for further details concerning their stay. The patient improved throughout their stay and reached maximum medical improvement on the day of discharge. The patient/family agree with the treatment plan as outlined above. All questions concerning their stay were answered prior to discharge. They understand the importance of follow up concerning any abnormal test results.     Physical Exam  Constitutional:       Appearance: He is well-developed. He is obese.   HENT:      Head: Normocephalic and atraumatic.   Eyes:      Extraocular Movements: Extraocular movements intact.      Conjunctiva/sclera: Conjunctivae normal.   Cardiovascular:      Rate and Rhythm: Normal rate and regular rhythm.      Heart sounds: Normal heart sounds. No murmur heard.    No  friction rub.   Pulmonary:      Effort: Pulmonary effort is normal. No respiratory distress.      Breath sounds: No wheezing or rales.   Abdominal:      General: Bowel sounds are normal. There is no distension.      Palpations: Abdomen is soft.      Tenderness: There is no abdominal tenderness.   Skin:     Capillary Refill: Capillary refill takes less than 2 seconds.   Neurological:      Mental Status: He is alert and oriented to person, place, and time.      Cranial Nerves: No cranial nerve deficit.   Psychiatric:         Behavior: Behavior normal.           Discharge Medications:         Discharge Medications      New Medications      Instructions Start Date   metFORMIN 500 MG tablet  Commonly known as: GLUCOPHAGE   500 mg, Oral, 2 Times Daily With Meals         Continue These Medications      Instructions Start Date   losartan 100 MG tablet  Commonly known as: COZAAR   100 mg, Oral, Daily      omeprazole 20 MG capsule  Commonly known as: priLOSEC   20 mg, Oral, 2 Times Daily      pravastatin 40 MG tablet  Commonly known as: PRAVACHOL   40 mg, Oral, Daily         ASK your doctor about these medications      Instructions Start Date   ipratropium-albuterol 0.5-2.5 mg/3 ml nebulizer  Commonly known as: DUO-NEB   3 mL, Nebulization, 3 Times Daily - RT             Activity: As tolerated    Diet: Diabetic    Consults: None    Significant Diagnostic Studies:    XR Chest 1 View    Result Date: 12/26/2022  1. Hypoventilated lungs with basilar densities favoring vascular crowding and atelectasis. Basilar pneumonitis considered. No radiographic evidence of edema. This report was finalized on 12/26/2022 12:27 by Dr. Ashwini Moser MD.      Disposition:   Discharge home    Time spent on discharge including discussion with patient/family, SW, and coordination of care.     Follow up with Donovan Jean Baptiste MD in 1-2 weeks.    Signed:  Mick Olmedo MD   12/30/2022, 08:05 CST

## 2022-12-30 NOTE — PLAN OF CARE
Problem: Adult Inpatient Plan of Care  Goal: Plan of Care Review  Outcome: Met  Flowsheets  Taken 12/30/2022 1034 by Debby Dobbins, RN  Progress: improving  Taken 12/30/2022 0224 by Cindi Burnett, RN  Plan of Care Reviewed With:   patient   spouse   Goal Outcome Evaluation:         VSS.  Goals met.  Up ad uday.  Voiding.  Safety maintained.  Verbalizes understanding of all discharge instructions.    Progress: improving

## 2022-12-31 ENCOUNTER — READMISSION MANAGEMENT (OUTPATIENT)
Dept: CALL CENTER | Facility: HOSPITAL | Age: 42
End: 2022-12-31

## 2022-12-31 LAB
BACTERIA SPEC AEROBE CULT: NORMAL
BACTERIA SPEC AEROBE CULT: NORMAL

## 2022-12-31 NOTE — OUTREACH NOTE
Prep Survey    Flowsheet Row Responses   [facility] facility patient discharged from? [place]   Is LACE score < 7 ? No   Eligibility Readm Mgmt   Discharge diagnosis Pneumonia   Does the patient have one of the following disease processes/diagnoses(primary or secondary)? Pneumonia   Does the patient have Home health ordered? No   Is there a DME ordered? No   Prep survey completed? Yes          JOSR MORAN - Registered Nurse

## 2023-01-02 NOTE — PAYOR COMM NOTE
"REF: [id number]     [facility]  [place],   [phone]  OR  FAX  [phone]      Conner Jose (42 y.o. Male)     Date of Birth   1980    Social Security Number       Address   [address] [place] [address]    Home Phone   [phone]    MRN   [id number]       Spiritism   Restorationist    Marital Status                               Admission Date   22    Admission Type   Emergency    Admitting Provider       Attending Provider       Department, Room/Bed   [facility] 3C, 376/1       Discharge Date   2022    Discharge Disposition   Home or Self Care    Discharge Destination                               Attending Provider: (none)   Allergies: No Known Allergies    Isolation: None   Infection: None   Code Status: Prior    Ht: 177.8 cm (70\")   Wt: 161 kg (356 lb)    Admission Cmt: None   Principal Problem: Pneumonia due to infectious organism, unspecified laterality, unspecified part of lung [J18.9]                 Active Insurance as of 2022     Primary Coverage     Payor Plan Insurance Group Employer/Plan Group    [employer] [facility] [employer] [facility] PPO [id number]     Payor Plan Address Payor Plan Phone Number Payor Plan Fax Number Effective Dates    [address] [phone]  2011 - None Entered    [place] [address]       Subscriber Name Subscriber Birth Date Member ID       CONNER JOSE 1980 [id number]                 Emergency Contacts      (Rel.) Home Phone Work Phone Mobile Phone    Leigh Jose (Spouse) [phone] -- --               Discharge Summary      Mick Olmedo MD at 22 0804            Hospital Discharge Summary    Conner Jose  :  1980  MRN:  [id number]    Admit date:  2022  Discharge date: 2022    Admitting Physician:    No admitting provider for patient encounter.    Discharge Diagnoses:      Pneumonia due to infectious organism, unspecified laterality, " unspecified part of lung    Pneumonia of right lung due to infectious organism    Hypertension      Hospital Course:   The patient is a 42 y.o. male who presents with worsening upper respiratory symptoms.  The patient states that he developed mild cold-like symptoms over a week ago with sinus drainage and cough.  He reports that his cough gradually worsened and then he became short of breath and developed fever over the last couple of days.  Presented to the ER for worsening condition over the weekend and was found to have right lower lobe pneumonia.  Admitted for IV antibiotics and further treatment.  Improved with this treatment.  Finished course of Rocephin and azithromycin.  Discharged home with new prescription of metformin twice daily, close follow-up in the office.    The patient was admitted for the above noted medical/surgical issues. Please see daily progress note for further details concerning their stay. The patient improved throughout their stay and reached maximum medical improvement on the day of discharge. The patient/family agree with the treatment plan as outlined above. All questions concerning their stay were answered prior to discharge. They understand the importance of follow up concerning any abnormal test results.     Physical Exam  Constitutional:       Appearance: He is well-developed. He is obese.   HENT:      Head: Normocephalic and atraumatic.   Eyes:      Extraocular Movements: Extraocular movements intact.      Conjunctiva/sclera: Conjunctivae normal.   Cardiovascular:      Rate and Rhythm: Normal rate and regular rhythm.      Heart sounds: Normal heart sounds. No murmur heard.    No friction rub.   Pulmonary:      Effort: Pulmonary effort is normal. No respiratory distress.      Breath sounds: No wheezing or rales.   Abdominal:      General: Bowel sounds are normal. There is no distension.      Palpations: Abdomen is soft.      Tenderness: There is no abdominal tenderness.   Skin:      Capillary Refill: Capillary refill takes less than 2 seconds.   Neurological:      Mental Status: He is alert and oriented to person, place, and time.      Cranial Nerves: No cranial nerve deficit.   Psychiatric:         Behavior: Behavior normal.           Discharge Medications:         Discharge Medications      New Medications      Instructions Start Date   metFORMIN 500 MG tablet  Commonly known as: GLUCOPHAGE   500 mg, Oral, 2 Times Daily With Meals         Continue These Medications      Instructions Start Date   losartan 100 MG tablet  Commonly known as: COZAAR   100 mg, Oral, Daily      omeprazole 20 MG capsule  Commonly known as: priLOSEC   20 mg, Oral, 2 Times Daily      pravastatin 40 MG tablet  Commonly known as: PRAVACHOL   40 mg, Oral, Daily         ASK your doctor about these medications      Instructions Start Date   ipratropium-albuterol 0.5-2.5 mg/3 ml nebulizer  Commonly known as: DUO-NEB   3 mL, Nebulization, 3 Times Daily - RT             Activity: As tolerated    Diet: Diabetic    Consults: None    Significant Diagnostic Studies:    XR Chest 1 View    Result Date: 12/26/2022  1. Hypoventilated lungs with basilar densities favoring vascular crowding and atelectasis. Basilar pneumonitis considered. No radiographic evidence of edema. This report was finalized on 12/26/2022 12:27 by Dr. Ashwini Moser MD.      Disposition:   Discharge home    Time spent on discharge including discussion with patient/family, SW, and coordination of care.     Follow up with Donovan Jean Baptiste MD in 1-2 weeks.    Signed:  Js Arevalo MD   12/30/2022, 08:05 CST      Electronically signed by Js Arevalo MD at 12/30/22 0807       Discharge Order (From admission, onward)     Start     Ordered    12/30/22 0804  Discharge patient  Once        Expected Discharge Date: 12/30/22    Discharge Disposition: Home or Self Care    Physician of Record for Attribution - Please select from Treatment Team: JS AREVALO  W [[id number]]    Review needed by CMO to determine Physician of Record: No       Question Answer Comment   Physician of Record for Attribution - Please select from Treatment Team JS AREVALO    Review needed by CMO to determine Physician of Record No        12/30/22 0804

## 2023-01-04 ENCOUNTER — READMISSION MANAGEMENT (OUTPATIENT)
Dept: CALL CENTER | Facility: HOSPITAL | Age: 43
End: 2023-01-04
Payer: COMMERCIAL

## 2023-01-04 NOTE — OUTREACH NOTE
COPD/PN Week 1 Survey    Flowsheet Row Responses   [facility] facility patient discharged from? [place]   Does the patient have one of the following disease processes/diagnoses(primary or secondary)? Pneumonia   Week 1 attempt successful? No   Unsuccessful attempts Attempt 1          ROBERTO Herr Registered Nurse

## 2023-01-06 ENCOUNTER — READMISSION MANAGEMENT (OUTPATIENT)
Dept: CALL CENTER | Facility: HOSPITAL | Age: 43
End: 2023-01-06
Payer: COMMERCIAL

## 2023-01-06 NOTE — OUTREACH NOTE
COPD/PN Week 1 Survey    Flowsheet Row Responses   [facility] patient discharged from? [place]   Does the patient have one of the following disease processes/diagnoses(primary or secondary)? Pneumonia   Week 1 attempt successful? Yes   Call start time 1014   Call end time 1022   Discharge diagnosis Pneumonia   Person spoke with today (if not patient) and relationship spouse   Meds reviewed with patient/caregiver? Yes   Does the patient have all medications ordered at discharge? Yes   Is the patient taking all medications as directed (includes completed medication regime)? Yes   Medication comments Medications added with f/u appt with PCP   Does the patient have a primary care provider?  Yes   Does the patient have an appointment with their PCP or specialist within 7 days of discharge? Yes   Comments regarding PCP patient has had appt with PCP since discharge.    Has the patient kept scheduled appointments due by today? Yes   Comments Next PCP appt    Has home health visited the patient within 72 hours of discharge? N/A   DME comments patient has home nebulizer. Wife reports current nebulizer med they have at home . Advised to recontact PCP for new duoneb med order.    Pulse Ox monitoring Intermittent   Pulse Ox device source Patient   O2 Sat: education provided Sat levels, Monitoring frequency, When to seek care   O2 Sat education comments Advised to seek treatment if O2 sats remain below 90% with rest, deep breathing and use of nebulizer machine.    Psychosocial issues? No   Did the patient receive a copy of their discharge instructions? Yes   Nursing interventions Reviewed instructions with patient   What is the patient's perception of their health status since discharge? Worsening  [Reports patient worsened post discharge and saw PCP. Additional antibiotic, trelegy, nystatin ordered with that visit. Wife reports some improvement now. ]   If the patient is a current smoker, are they able to teach  back resources for cessation? Not a smoker   Is the patient/caregiver able to teach back the hierarchy of who to call/visit for symptoms/problems? PCP, Specialist, Home health nurse, Urgent Care, ED, 911 Yes   Is the patient/caregiver able to teach back signs and symptoms of worsening condition: Fever/chills, Shortness of breath, Chest pain   Is the patient/caregiver able to teach back importance of completing antibiotic course of treatment? Yes   Week 1 call completed? Yes          BREEZY COPE - Registered Nurse

## 2023-01-16 ENCOUNTER — READMISSION MANAGEMENT (OUTPATIENT)
Dept: CALL CENTER | Facility: HOSPITAL | Age: 43
End: 2023-01-16
Payer: COMMERCIAL

## 2023-01-16 NOTE — OUTREACH NOTE
COPD/PN Week 2 Survey    Flowsheet Row Responses   [facility] patient discharged from? [place]   Does the patient have one of the following disease processes/diagnoses(primary or secondary)? Pneumonia   Week 2 attempt successful? Yes   Call start time 0950   Call end time 0959   Discharge diagnosis Pneumonia   Person spoke with today (if not patient) and relationship spouse, Leigh Savage reviewed with patient/caregiver? Yes   Does the patient have all medications ordered at discharge? Yes   Is the patient taking all medications as directed (includes completed medication regime)? Yes   Does the patient have a primary care provider?  Yes   Comments regarding PCP Seeing PCP again 1/19   Has the patient kept scheduled appointments due by today? Yes   Has home health visited the patient within 72 hours of discharge? N/A   DME comments Wife reports patient using Nebulizer as needed.    Pulse Ox monitoring Intermittent   Pulse Ox device source Patient   O2 Sat comments Did not share any readings today   Psychosocial issues? No   Did the patient receive a copy of their discharge instructions? Yes   Nursing interventions Reviewed instructions with patient   What is the patient's perception of their health status since discharge? Improving  [Wife reports patient improving after seeing PCP and finished antibiotics. ]   If the patient is a current smoker, are they able to teach back resources for cessation? Not a smoker   Is the patient/caregiver able to teach back the hierarchy of who to call/visit for symptoms/problems? PCP, Specialist, Home health nurse, Urgent Care, ED, 911 Yes   Is the patient/caregiver able to teach back importance of completing antibiotic course of treatment? Yes   Week 2 call completed? Yes   Wrap up additional comments Denies questions or needs today.           BREEZY COPE - Registered Nurse

## (undated) DEVICE — YANKAUER,BULB TIP WITH VENT: Brand: ARGYLE

## (undated) DEVICE — THE CHANNEL CLEANING BRUSH IS A NYLON FLEXI BRUSH ATTACHED TO A FLEXIBLE PLASTIC SHEATH DESIGNED TO SAFELY REMOVE DEBRIS FROM FLEXIBLE ENDOSCOPES.

## (undated) DEVICE — SENSR O2 OXIMAX FNGR A/ 18IN NONSTR

## (undated) DEVICE — SNAR POLYP SENSATION MICRO OVL 13 240X40

## (undated) DEVICE — MASK,OXYGEN,MED CONC,ADLT,7' TUB, UC: Brand: PENDING

## (undated) DEVICE — THE SINGLE USE ETRAP – POLYP TRAP IS USED FOR SUCTION RETRIEVAL OF ENDOSCOPICALLY REMOVED POLYPS.: Brand: ETRAP

## (undated) DEVICE — TBG SMPL FLTR LINE NASL 02/C02 A/ BX/100

## (undated) DEVICE — Device: Brand: DEFENDO AIR/WATER/SUCTION AND BIOPSY VALVE

## (undated) DEVICE — CUFF,BP,DISP,1 TUBE,ADULT,HP: Brand: MEDLINE